# Patient Record
Sex: MALE | Race: WHITE | NOT HISPANIC OR LATINO | Employment: OTHER | URBAN - METROPOLITAN AREA
[De-identification: names, ages, dates, MRNs, and addresses within clinical notes are randomized per-mention and may not be internally consistent; named-entity substitution may affect disease eponyms.]

---

## 2017-01-10 ENCOUNTER — HOSPITAL ENCOUNTER (OUTPATIENT)
Dept: CARDIOLOGY | Facility: HOSPITAL | Age: 67
Setting detail: RECURRING SERIES
Discharge: HOME | End: 2017-01-10

## 2017-01-10 PROCEDURE — 85610 PROTHROMBIN TIME: CPT | Performed by: INTERNAL MEDICINE

## 2017-01-10 PROCEDURE — 36416 COLLJ CAPILLARY BLOOD SPEC: CPT | Performed by: INTERNAL MEDICINE

## 2017-01-18 ENCOUNTER — CLINICAL SUPPORT NO REQUIREMENTS (OUTPATIENT)
Dept: CARDIOLOGY | Facility: CLINIC | Age: 67
End: 2017-01-18

## 2017-01-18 DIAGNOSIS — I50.9 CONGESTIVE HEART FAILURE, UNSPECIFIED CONGESTIVE HEART FAILURE CHRONICITY, UNSPECIFIED CONGESTIVE HEART FAILURE TYPE: Primary | ICD-10-CM

## 2017-01-18 PROCEDURE — 93290 INTERROG DEV EVAL ICPMS IP: CPT | Performed by: INTERNAL MEDICINE

## 2017-01-18 PROCEDURE — 93281 PM DEVICE PROGR EVAL MULTI: CPT | Performed by: INTERNAL MEDICINE

## 2017-01-27 DIAGNOSIS — F32.A DEPRESSION: ICD-10-CM

## 2017-01-27 RX ORDER — CITALOPRAM 20 MG/1
TABLET ORAL
Qty: 30 TABLET | Refills: 4 | Status: SHIPPED | OUTPATIENT
Start: 2017-01-27 | End: 2019-05-03

## 2017-02-06 ENCOUNTER — HOSPITAL ENCOUNTER (OUTPATIENT)
Dept: CARDIOLOGY | Facility: HOSPITAL | Age: 67
Setting detail: RECURRING SERIES
Discharge: HOME | End: 2017-02-06

## 2017-02-06 PROCEDURE — 36416 COLLJ CAPILLARY BLOOD SPEC: CPT

## 2017-02-06 PROCEDURE — 85610 PROTHROMBIN TIME: CPT

## 2017-02-09 ENCOUNTER — OFFICE VISIT (OUTPATIENT)
Dept: INTERNAL MEDICINE | Facility: CLINIC | Age: 67
End: 2017-02-09

## 2017-02-09 VITALS
DIASTOLIC BLOOD PRESSURE: 66 MMHG | RESPIRATION RATE: 16 BRPM | TEMPERATURE: 96.9 F | SYSTOLIC BLOOD PRESSURE: 116 MMHG | HEART RATE: 72 BPM | BODY MASS INDEX: 29.96 KG/M2 | WEIGHT: 224 LBS

## 2017-02-09 DIAGNOSIS — I10 ESSENTIAL HYPERTENSION: ICD-10-CM

## 2017-02-09 DIAGNOSIS — Z95.820 S/P ANGIOPLASTY WITH STENT: ICD-10-CM

## 2017-02-09 DIAGNOSIS — R19.4 CHANGE IN BOWEL HABITS: ICD-10-CM

## 2017-02-09 DIAGNOSIS — I25.10 CORONARY ARTERY DISEASE INVOLVING NATIVE CORONARY ARTERY OF NATIVE HEART WITHOUT ANGINA PECTORIS: ICD-10-CM

## 2017-02-09 DIAGNOSIS — E78.2 MIXED HYPERLIPIDEMIA: Primary | ICD-10-CM

## 2017-02-09 DIAGNOSIS — E11.8 TYPE 2 DIABETES MELLITUS WITH COMPLICATION, WITH LONG-TERM CURRENT USE OF INSULIN (HCC): ICD-10-CM

## 2017-02-09 DIAGNOSIS — Z79.4 TYPE 2 DIABETES MELLITUS WITH COMPLICATION, WITH LONG-TERM CURRENT USE OF INSULIN (HCC): ICD-10-CM

## 2017-02-09 PROCEDURE — 99214 OFFICE O/P EST MOD 30 MIN: CPT | Performed by: FAMILY MEDICINE

## 2017-02-09 RX ORDER — CARVEDILOL 12.5 MG/1
6.25 TABLET ORAL 2 TIMES DAILY
Refills: 0 | COMMUNITY
Start: 2016-12-31 | End: 2017-10-18 | Stop reason: ALTCHOICE

## 2017-02-09 NOTE — PROGRESS NOTES
Subjective   Andreas Rubin is a 66 y.o. male.     Chief Complaint   Patient presents with   • GI Problem   • Hypertension   • Coronary Artery Disease   • Congestive Heart Failure         History of Present Illness   Patient is here continue by his brother.  He states he's feeling fine with no problems.  Actually he has a lot of flatus when he walks around passing gas.  He has a severe cardiomyopathy as noted by Dr. Jaffe and has a pacemaker which is checked frequently have reviewed the pacemaker recent documentation.  He has a cardiomyopathy with about 20% ejection fraction most recent echocardiogram.  He does sleep a lot in the daytime.  He enjoys watching fairly old television comedies.    He also has an enhanced gastrocolic reflex with tendency to want to have a bowel movement during the largest meal.  He has not had a colonoscopy in some time now.  Next option  Would be to get a colonoscopy.      The following portions of the patient's history were reviewed and updated as appropriate: allergies, current medications, past social history and problem list.    Review of Systems   Constitutional: Positive for fever.   HENT: Negative.    Eyes: Negative.    Respiratory: Negative.    Cardiovascular: Negative.    Gastrointestinal: Negative.    Endocrine: Negative.    Genitourinary: Negative.    Musculoskeletal: Negative.    Skin: Negative.    Allergic/Immunologic: Negative.    Neurological: Negative.    Hematological: Negative.    Psychiatric/Behavioral: Positive for sleep disturbance (Hypersomnulence).       Objective   Vitals:    02/09/17 1031   BP: 116/66   Pulse: 72   Resp: 16   Temp: 96.9 °F (36.1 °C)     Physical Exam   Constitutional: He is oriented to person, place, and time. He appears well-developed and well-nourished.   HENT:   Head: Normocephalic and atraumatic.   Right Ear: Tympanic membrane and external ear normal.   Left Ear: Tympanic membrane and external ear normal.   Nose: Nose normal.    Mouth/Throat: Oropharynx is clear and moist.   Eyes: Conjunctivae and EOM are normal. Pupils are equal, round, and reactive to light.   Neck: Normal range of motion. Neck supple. No JVD present. No thyromegaly present.   Cardiovascular: Normal rate, regular rhythm and intact distal pulses.  Exam reveals gallop and S3.    Pulmonary/Chest: Effort normal and breath sounds normal.   Abdominal: Soft. Bowel sounds are normal.   Musculoskeletal: Normal range of motion.   Lymphadenopathy:     He has no cervical adenopathy.   Neurological: He is alert and oriented to person, place, and time. No cranial nerve deficit. Coordination normal.   Skin: Skin is warm and dry. No rash noted.   Psychiatric: He has a normal mood and affect. His behavior is normal. Judgment and thought content normal.   Vitals reviewed.      Assessment/Plan   Problem List Items Addressed This Visit        Cardiovascular and Mediastinum    Hyperlipidemia - Primary    Essential hypertension    Relevant Medications    carvedilol (COREG) 12.5 MG tablet    Coronary artery disease involving native coronary artery of native heart without angina pectoris    Relevant Medications    carvedilol (COREG) 12.5 MG tablet       Endocrine    Type 2 diabetes mellitus with complication, with long-term current use of insulin       Other    S/P angioplasty with stent      Other Visit Diagnoses     Change in bowel habits        Relevant Orders    Ambulatory Referral For Screening Colonoscopy       plan: Referral to Dr. Valentin for colonoscopy.  Continue current medications.  Negative changes medications based on borderline low blood pressure.  Follow-up with Dr. Jaffe.  Recheck in 4 months.  Labs through Dr. JIM [facility]urston office.

## 2017-02-15 ENCOUNTER — TELEPHONE (OUTPATIENT)
Dept: CARDIOLOGY | Facility: CLINIC | Age: 67
End: 2017-02-15

## 2017-02-15 NOTE — TELEPHONE ENCOUNTER
Pt is scheduled to have a colonoscopy and needs the ok to stop AC 5 days prior. No history of stroke or valve replacement...Clau

## 2017-03-03 ENCOUNTER — ANESTHESIA (OUTPATIENT)
Dept: GASTROENTEROLOGY | Facility: HOSPITAL | Age: 67
End: 2017-03-03

## 2017-03-03 ENCOUNTER — ANESTHESIA EVENT (OUTPATIENT)
Dept: GASTROENTEROLOGY | Facility: HOSPITAL | Age: 67
End: 2017-03-03

## 2017-03-03 ENCOUNTER — HOSPITAL ENCOUNTER (OUTPATIENT)
Facility: HOSPITAL | Age: 67
Setting detail: HOSPITAL OUTPATIENT SURGERY
Discharge: HOME | End: 2017-03-03
Attending: SURGERY | Admitting: SURGERY

## 2017-03-03 VITALS
SYSTOLIC BLOOD PRESSURE: 121 MMHG | WEIGHT: 215.31 LBS | DIASTOLIC BLOOD PRESSURE: 83 MMHG | OXYGEN SATURATION: 95 % | BODY MASS INDEX: 29.16 KG/M2 | HEART RATE: 72 BPM | TEMPERATURE: 98.4 F | HEIGHT: 72 IN | RESPIRATION RATE: 18 BRPM

## 2017-03-03 LAB — GLUCOSE BLDC GLUCOMTR-MCNC: 148 MG/DL (ref 70–130)

## 2017-03-03 PROCEDURE — 82962 GLUCOSE BLOOD TEST: CPT

## 2017-03-03 PROCEDURE — 25010000002 PROPOFOL 10 MG/ML EMULSION: Performed by: ANESTHESIOLOGY

## 2017-03-03 RX ORDER — PROPOFOL 10 MG/ML
VIAL (ML) INTRAVENOUS AS NEEDED
Status: DISCONTINUED | OUTPATIENT
Start: 2017-03-03 | End: 2017-03-03 | Stop reason: SURG

## 2017-03-03 RX ORDER — PROPOFOL 10 MG/ML
VIAL (ML) INTRAVENOUS CONTINUOUS PRN
Status: DISCONTINUED | OUTPATIENT
Start: 2017-03-03 | End: 2017-03-03 | Stop reason: SURG

## 2017-03-03 RX ORDER — SODIUM CHLORIDE, SODIUM LACTATE, POTASSIUM CHLORIDE, CALCIUM CHLORIDE 600; 310; 30; 20 MG/100ML; MG/100ML; MG/100ML; MG/100ML
30 INJECTION, SOLUTION INTRAVENOUS CONTINUOUS PRN
Status: DISCONTINUED | OUTPATIENT
Start: 2017-03-03 | End: 2017-03-03 | Stop reason: HOSPADM

## 2017-03-03 RX ORDER — LIDOCAINE HYDROCHLORIDE 20 MG/ML
INJECTION, SOLUTION INFILTRATION; PERINEURAL AS NEEDED
Status: DISCONTINUED | OUTPATIENT
Start: 2017-03-03 | End: 2017-03-03 | Stop reason: SURG

## 2017-03-03 RX ADMIN — LIDOCAINE HYDROCHLORIDE 60 MG: 20 INJECTION, SOLUTION INFILTRATION; PERINEURAL at 08:51

## 2017-03-03 RX ADMIN — PROPOFOL 100 MCG/KG/MIN: 10 INJECTION, EMULSION INTRAVENOUS at 08:51

## 2017-03-03 RX ADMIN — SODIUM CHLORIDE, POTASSIUM CHLORIDE, SODIUM LACTATE AND CALCIUM CHLORIDE: 600; 310; 30; 20 INJECTION, SOLUTION INTRAVENOUS at 08:45

## 2017-03-03 RX ADMIN — PROPOFOL 100 MG: 10 INJECTION, EMULSION INTRAVENOUS at 08:50

## 2017-03-03 NOTE — ANESTHESIA PREPROCEDURE EVALUATION
Anesthesia Evaluation     Patient summary reviewed and Nursing notes reviewed      Airway   Mallampati: I  TM distance: <3 FB  Neck ROM: full  no difficulty expected  Dental - normal exam     Pulmonary - normal exam   (+) sleep apnea,   Cardiovascular - normal exam    (+) pacemaker pacemaker,hypertension, CAD, dysrhythmias,       Neuro/Psych- negative ROS  GI/Hepatic/Renal/Endo    (+)  diabetes mellitus,     Musculoskeletal (-) negative ROS    Abdominal  - normal exam    Bowel sounds: normal.   Substance History - negative use     OB/GYN negative ob/gyn ROS         Other                                    Anesthesia Plan    ASA 3     MAC     Anesthetic plan and risks discussed with patient.

## 2017-03-03 NOTE — H&P
Andreas Rubin is a 66 y.o. male who presents today for a Colonoscopy.  He has had a change in bowel habits with increased urgency of late.  He is about due for his screening, too.    Past Medical History   Diagnosis Date   • Atrial fibrillation    • DM type 2 (diabetes mellitus, type 2)    • Hyperlipidemia    • Hypertension          Objective     Pt is in no distress.  Heart regular.  Chest clear.  Abdomen soft.  Rectal deferred to endoscopy.      Assessment/Plan      Plan a Colonoscopy today.  Risks and benefits were discussed.  Patient is agreeable.  Final recommendations will follow depending on the results.

## 2017-03-03 NOTE — ANESTHESIA POSTPROCEDURE EVALUATION
Patient: Andreas Rubin    Procedure Summary     Date Anesthesia Start Anesthesia Stop Room / Location    03/03/17 0847 0912  [facility]       Procedure Diagnosis Surgeon Provider    COLONOSCOPY TO CECUM (N/A ) No diagnosis on file. MD Basilio Oneill MD          Anesthesia Type: MAC  Last vitals  /71 (03/03/17 0911)    Temp      Pulse 70 (03/03/17 0911)   Resp 16 (03/03/17 0911)    SpO2 95 % (03/03/17 0911)      Post Anesthesia Care and Evaluation    Patient location during evaluation: PACU  Patient participation: complete - patient participated  Level of consciousness: awake and alert  Pain score: 0  Pain management: adequate  Airway patency: patent  Anesthetic complications: No anesthetic complications  PONV Status: none  Cardiovascular status: acceptable  Respiratory status: acceptable  Hydration status: acceptable

## 2017-03-07 ENCOUNTER — HOSPITAL ENCOUNTER (OUTPATIENT)
Dept: CARDIOLOGY | Facility: HOSPITAL | Age: 67
Setting detail: RECURRING SERIES
Discharge: HOME | End: 2017-03-07

## 2017-03-07 PROCEDURE — 36416 COLLJ CAPILLARY BLOOD SPEC: CPT

## 2017-03-07 PROCEDURE — 85610 PROTHROMBIN TIME: CPT

## 2017-03-10 ENCOUNTER — HOSPITAL ENCOUNTER (OUTPATIENT)
Dept: CARDIOLOGY | Facility: HOSPITAL | Age: 67
Setting detail: RECURRING SERIES
Discharge: HOME | End: 2017-03-10

## 2017-03-10 PROCEDURE — 36416 COLLJ CAPILLARY BLOOD SPEC: CPT

## 2017-03-10 PROCEDURE — 85610 PROTHROMBIN TIME: CPT

## 2017-03-13 DIAGNOSIS — F32.A DEPRESSION: ICD-10-CM

## 2017-03-13 RX ORDER — CITALOPRAM 20 MG/1
TABLET ORAL
Qty: 30 TABLET | Refills: 4 | Status: SHIPPED | OUTPATIENT
Start: 2017-03-13 | End: 2017-07-03 | Stop reason: SDUPTHER

## 2017-03-13 RX ORDER — WARFARIN SODIUM 5 MG/1
TABLET ORAL
Qty: 90 TABLET | Refills: 0 | Status: SHIPPED | OUTPATIENT
Start: 2017-03-13 | End: 2017-06-26 | Stop reason: SDUPTHER

## 2017-03-15 RX ORDER — PRAVASTATIN SODIUM 40 MG
40 TABLET ORAL NIGHTLY
Qty: 90 TABLET | Refills: 1 | Status: SHIPPED | OUTPATIENT
Start: 2017-03-15 | End: 2017-12-18 | Stop reason: SDUPTHER

## 2017-03-17 ENCOUNTER — HOSPITAL ENCOUNTER (OUTPATIENT)
Dept: CARDIOLOGY | Facility: HOSPITAL | Age: 67
Setting detail: RECURRING SERIES
Discharge: HOME | End: 2017-03-17

## 2017-03-17 PROCEDURE — 36416 COLLJ CAPILLARY BLOOD SPEC: CPT

## 2017-03-17 PROCEDURE — 85610 PROTHROMBIN TIME: CPT

## 2017-03-21 DIAGNOSIS — I10 ESSENTIAL HYPERTENSION: ICD-10-CM

## 2017-03-22 RX ORDER — FUROSEMIDE 40 MG/1
TABLET ORAL
Qty: 30 TABLET | Refills: 5 | Status: SHIPPED | OUTPATIENT
Start: 2017-03-22 | End: 2017-10-06 | Stop reason: SDUPTHER

## 2017-03-30 RX ORDER — CARVEDILOL 12.5 MG/1
TABLET ORAL
Qty: 180 TABLET | Refills: 0 | Status: SHIPPED | OUTPATIENT
Start: 2017-03-30 | End: 2017-07-03

## 2017-03-31 ENCOUNTER — HOSPITAL ENCOUNTER (OUTPATIENT)
Dept: CARDIOLOGY | Facility: HOSPITAL | Age: 67
Setting detail: RECURRING SERIES
Discharge: HOME | End: 2017-03-31

## 2017-03-31 PROCEDURE — 36416 COLLJ CAPILLARY BLOOD SPEC: CPT

## 2017-03-31 PROCEDURE — 85610 PROTHROMBIN TIME: CPT

## 2017-04-19 ENCOUNTER — CLINICAL SUPPORT NO REQUIREMENTS (OUTPATIENT)
Dept: CARDIOLOGY | Facility: CLINIC | Age: 67
End: 2017-04-19

## 2017-04-19 DIAGNOSIS — I50.9 CHRONIC CONGESTIVE HEART FAILURE, UNSPECIFIED CONGESTIVE HEART FAILURE TYPE: Primary | ICD-10-CM

## 2017-04-19 PROCEDURE — 93297 REM INTERROG DEV EVAL ICPMS: CPT | Performed by: INTERNAL MEDICINE

## 2017-04-19 PROCEDURE — 93296 REM INTERROG EVL PM/IDS: CPT | Performed by: INTERNAL MEDICINE

## 2017-04-19 PROCEDURE — 93295 DEV INTERROG REMOTE 1/2/MLT: CPT | Performed by: INTERNAL MEDICINE

## 2017-04-25 RX ORDER — INSULIN DETEMIR 100 [IU]/ML
INJECTION, SOLUTION SUBCUTANEOUS
Qty: 58 ML | Refills: 1 | Status: SHIPPED | OUTPATIENT
Start: 2017-04-25 | End: 2018-09-24 | Stop reason: SDUPTHER

## 2017-05-04 ENCOUNTER — HOSPITAL ENCOUNTER (OUTPATIENT)
Dept: CARDIOLOGY | Facility: HOSPITAL | Age: 67
Setting detail: RECURRING SERIES
Discharge: HOME | End: 2017-05-04

## 2017-05-04 PROCEDURE — 85610 PROTHROMBIN TIME: CPT

## 2017-05-04 PROCEDURE — 36416 COLLJ CAPILLARY BLOOD SPEC: CPT

## 2017-05-17 PROCEDURE — 85610 PROTHROMBIN TIME: CPT

## 2017-05-17 PROCEDURE — 36416 COLLJ CAPILLARY BLOOD SPEC: CPT

## 2017-05-18 ENCOUNTER — HOSPITAL ENCOUNTER (OUTPATIENT)
Dept: CARDIOLOGY | Facility: HOSPITAL | Age: 67
Setting detail: RECURRING SERIES
Discharge: HOME | End: 2017-05-18

## 2017-05-18 PROCEDURE — 36416 COLLJ CAPILLARY BLOOD SPEC: CPT

## 2017-05-18 PROCEDURE — 85610 PROTHROMBIN TIME: CPT

## 2017-06-15 ENCOUNTER — HOSPITAL ENCOUNTER (OUTPATIENT)
Dept: CARDIOLOGY | Facility: HOSPITAL | Age: 67
Setting detail: RECURRING SERIES
Discharge: HOME | End: 2017-06-15

## 2017-06-15 PROCEDURE — 36416 COLLJ CAPILLARY BLOOD SPEC: CPT

## 2017-06-15 PROCEDURE — 85610 PROTHROMBIN TIME: CPT

## 2017-06-19 ENCOUNTER — OFFICE VISIT (OUTPATIENT)
Dept: INTERNAL MEDICINE | Facility: CLINIC | Age: 67
End: 2017-06-19

## 2017-06-19 VITALS
WEIGHT: 217 LBS | SYSTOLIC BLOOD PRESSURE: 86 MMHG | BODY MASS INDEX: 29.39 KG/M2 | RESPIRATION RATE: 16 BRPM | HEIGHT: 72 IN | HEART RATE: 71 BPM | OXYGEN SATURATION: 95 % | DIASTOLIC BLOOD PRESSURE: 59 MMHG | TEMPERATURE: 98.3 F

## 2017-06-19 DIAGNOSIS — R27.0 ATAXIA: ICD-10-CM

## 2017-06-19 DIAGNOSIS — I10 ESSENTIAL HYPERTENSION: ICD-10-CM

## 2017-06-19 DIAGNOSIS — I95.1 ORTHOSTATIC HYPOTENSION: Primary | ICD-10-CM

## 2017-06-19 DIAGNOSIS — E11.8 TYPE 2 DIABETES MELLITUS WITH COMPLICATION, WITH LONG-TERM CURRENT USE OF INSULIN (HCC): ICD-10-CM

## 2017-06-19 DIAGNOSIS — I25.10 CORONARY ARTERY DISEASE INVOLVING NATIVE CORONARY ARTERY OF NATIVE HEART WITHOUT ANGINA PECTORIS: ICD-10-CM

## 2017-06-19 DIAGNOSIS — Z79.4 TYPE 2 DIABETES MELLITUS WITH COMPLICATION, WITH LONG-TERM CURRENT USE OF INSULIN (HCC): ICD-10-CM

## 2017-06-19 PROCEDURE — 99214 OFFICE O/P EST MOD 30 MIN: CPT | Performed by: FAMILY MEDICINE

## 2017-06-19 NOTE — PROGRESS NOTES
Subjective   Andreas Rubin is a 66 y.o. male.     Chief Complaint   Patient presents with   • Gait Problem   • Cardiomyopathy   • Hypotension         History of Present Illness   Patient is here currently by his brother.  He is somewhat mentally challenged has a history of pacer placement and cardiomyopathy along with diabetes as well as some reported hypertension.  Today's blood pressure is low in his had some ataxia home.  No syncope.  He feels like he is having no symptoms whatsoever and does not complain of anything.  He is very cheerful.      The following portions of the patient's history were reviewed and updated as appropriate: allergies, current medications, past social history and problem list.    Review of Systems   HENT: Negative.    Eyes: Negative.    Respiratory: Negative.    Cardiovascular: Negative.    Gastrointestinal: Negative.    Endocrine: Negative.    Genitourinary: Negative.    Musculoskeletal: Positive for gait problem.   Skin: Negative.    Allergic/Immunologic: Negative.    Neurological: Positive for weakness.   Hematological: Negative.    Psychiatric/Behavioral: Negative.        Objective   Vitals:    06/19/17 1046   BP: (!) 86/59   Pulse: 71   Resp: 16   Temp: 98.3 °F (36.8 °C)   SpO2: 95%     Physical Exam   Constitutional: He is oriented to person, place, and time. He appears well-developed and well-nourished.   HENT:   Head: Normocephalic and atraumatic.   Right Ear: Tympanic membrane and external ear normal.   Left Ear: Tympanic membrane and external ear normal.   Nose: Nose normal.   Mouth/Throat: Oropharynx is clear and moist.   Eyes: Conjunctivae and EOM are normal. Pupils are equal, round, and reactive to light.   Neck: Normal range of motion. Neck supple. No JVD present. No thyromegaly present.   Cardiovascular: Normal rate, regular rhythm, normal heart sounds and intact distal pulses.   Occasional extrasystoles are present.   Pulmonary/Chest: Effort normal and breath sounds  normal.   Abdominal: Soft. Bowel sounds are normal.   Musculoskeletal: Normal range of motion.   Lymphadenopathy:     He has no cervical adenopathy.   Neurological: He is alert and oriented to person, place, and time. No cranial nerve deficit. Coordination normal.   Skin: Skin is warm and dry. No rash noted.   Psychiatric: He has a normal mood and affect. His behavior is normal. Judgment and thought content normal.   Vitals reviewed.      Assessment/Plan   Problem List Items Addressed This Visit        Cardiovascular and Mediastinum    Essential hypertension    Coronary artery disease involving native coronary artery of native heart without angina pectoris       Endocrine    Type 2 diabetes mellitus with complication, with long-term current use of insulin      Other Visit Diagnoses     Orthostatic hypotension    -  Primary    Relevant Orders    Ambulatory Referral to Cardiology    Basic Metabolic Panel    CBC & Differential    Ataxia        Relevant Orders    Ambulatory Referral to Cardiology    Basic Metabolic Panel    CBC & Differential      Plan: We'll check BMP and CBC today.  Referral back to Dr. Jaffe.  Otherwise see him back in 5 months.  Continue current meds as far as hypertension for now pending cardiology follow-up.

## 2017-06-20 ENCOUNTER — TELEPHONE (OUTPATIENT)
Dept: CARDIOLOGY | Facility: CLINIC | Age: 67
End: 2017-06-20

## 2017-06-20 LAB
BASOPHILS # BLD AUTO: 0.06 10*3/MM3 (ref 0–0.2)
BASOPHILS NFR BLD AUTO: 0.9 % (ref 0–1.5)
BUN SERPL-MCNC: 16 MG/DL (ref 8–23)
BUN/CREAT SERPL: 18.6 (ref 7–25)
CALCIUM SERPL-MCNC: 9.6 MG/DL (ref 8.6–10.5)
CHLORIDE SERPL-SCNC: 99 MMOL/L (ref 98–107)
CO2 SERPL-SCNC: 28.5 MMOL/L (ref 22–29)
CREAT SERPL-MCNC: 0.86 MG/DL (ref 0.76–1.27)
EOSINOPHIL # BLD AUTO: 0.26 10*3/MM3 (ref 0–0.7)
EOSINOPHIL NFR BLD AUTO: 4.1 % (ref 0.3–6.2)
ERYTHROCYTE [DISTWIDTH] IN BLOOD BY AUTOMATED COUNT: 15 % (ref 11.5–14.5)
GLUCOSE SERPL-MCNC: 149 MG/DL (ref 65–99)
HCT VFR BLD AUTO: 46.8 % (ref 40.4–52.2)
HGB BLD-MCNC: 15.8 G/DL (ref 13.7–17.6)
IMM GRANULOCYTES # BLD: 0.02 10*3/MM3 (ref 0–0.03)
IMM GRANULOCYTES NFR BLD: 0.3 % (ref 0–0.5)
LYMPHOCYTES # BLD AUTO: 1.25 10*3/MM3 (ref 0.9–4.8)
LYMPHOCYTES NFR BLD AUTO: 19.6 % (ref 19.6–45.3)
MCH RBC QN AUTO: 32.5 PG (ref 27–32.7)
MCHC RBC AUTO-ENTMCNC: 33.8 G/DL (ref 32.6–36.4)
MCV RBC AUTO: 96.3 FL (ref 79.8–96.2)
MONOCYTES # BLD AUTO: 0.79 10*3/MM3 (ref 0.2–1.2)
MONOCYTES NFR BLD AUTO: 12.4 % (ref 5–12)
NEUTROPHILS # BLD AUTO: 4 10*3/MM3 (ref 1.9–8.1)
NEUTROPHILS NFR BLD AUTO: 62.7 % (ref 42.7–76)
PLATELET # BLD AUTO: 174 10*3/MM3 (ref 140–500)
POTASSIUM SERPL-SCNC: 4.8 MMOL/L (ref 3.5–5.2)
RBC # BLD AUTO: 4.86 10*6/MM3 (ref 4.6–6)
SODIUM SERPL-SCNC: 142 MMOL/L (ref 136–145)
WBC # BLD AUTO: 6.38 10*3/MM3 (ref 4.5–10.7)

## 2017-06-20 NOTE — TELEPHONE ENCOUNTER
Put him on my schedule 7/6/17 am-if patient and family are okay with that-he will not be in the office but he will be around if I need him.

## 2017-06-20 NOTE — TELEPHONE ENCOUNTER
Dr Dsouza put in a referral for Mr Caryn to see  for the first available follow up for low BP.  This is not until September.  Can you please take a look at his chart and let me know what we should do?    Thank you  Kenyatta CHEW

## 2017-06-22 ENCOUNTER — TELEPHONE (OUTPATIENT)
Dept: INTERNAL MEDICINE | Facility: CLINIC | Age: 67
End: 2017-06-22

## 2017-06-26 RX ORDER — WARFARIN SODIUM 5 MG/1
TABLET ORAL
Qty: 90 TABLET | Refills: 0 | Status: SHIPPED | OUTPATIENT
Start: 2017-06-26 | End: 2017-10-18 | Stop reason: SINTOL

## 2017-07-03 ENCOUNTER — OFFICE VISIT (OUTPATIENT)
Dept: ENDOCRINOLOGY | Age: 67
End: 2017-07-03

## 2017-07-03 VITALS
HEART RATE: 76 BPM | WEIGHT: 217.2 LBS | OXYGEN SATURATION: 97 % | BODY MASS INDEX: 29.42 KG/M2 | SYSTOLIC BLOOD PRESSURE: 118 MMHG | HEIGHT: 72 IN | DIASTOLIC BLOOD PRESSURE: 80 MMHG

## 2017-07-03 DIAGNOSIS — Z79.4 TYPE 2 DIABETES MELLITUS WITH COMPLICATION, WITH LONG-TERM CURRENT USE OF INSULIN (HCC): Primary | ICD-10-CM

## 2017-07-03 DIAGNOSIS — Z95.820 S/P ANGIOPLASTY WITH STENT: ICD-10-CM

## 2017-07-03 DIAGNOSIS — E11.3293 TYPE 2 DIABETES MELLITUS WITH MILD NONPROLIFERATIVE RETINOPATHY OF BOTH EYES, WITH LONG-TERM CURRENT USE OF INSULIN, MACULAR EDEMA PRESENCE UNSPECIFIED (HCC): ICD-10-CM

## 2017-07-03 DIAGNOSIS — I25.10 CORONARY ARTERY DISEASE INVOLVING NATIVE CORONARY ARTERY OF NATIVE HEART WITHOUT ANGINA PECTORIS: ICD-10-CM

## 2017-07-03 DIAGNOSIS — E78.5 HYPERLIPIDEMIA, UNSPECIFIED HYPERLIPIDEMIA TYPE: ICD-10-CM

## 2017-07-03 DIAGNOSIS — Z79.4 TYPE 2 DIABETES MELLITUS WITH MILD NONPROLIFERATIVE RETINOPATHY OF BOTH EYES, WITH LONG-TERM CURRENT USE OF INSULIN, MACULAR EDEMA PRESENCE UNSPECIFIED (HCC): ICD-10-CM

## 2017-07-03 DIAGNOSIS — G47.33 OBSTRUCTIVE SLEEP APNEA SYNDROME: ICD-10-CM

## 2017-07-03 DIAGNOSIS — E11.8 TYPE 2 DIABETES MELLITUS WITH COMPLICATION, WITH LONG-TERM CURRENT USE OF INSULIN (HCC): Primary | ICD-10-CM

## 2017-07-03 DIAGNOSIS — I10 ESSENTIAL HYPERTENSION: ICD-10-CM

## 2017-07-03 PROBLEM — I77.9 RIGHT-SIDED CAROTID ARTERY DISEASE (HCC): Status: ACTIVE | Noted: 2017-07-03

## 2017-07-03 LAB
ALBUMIN SERPL-MCNC: 4.3 G/DL (ref 3.5–5.2)
ALBUMIN/GLOB SERPL: 1.5 G/DL
ALP SERPL-CCNC: 99 U/L (ref 39–117)
ALT SERPL-CCNC: 17 U/L (ref 1–41)
AST SERPL-CCNC: 21 U/L (ref 1–40)
BILIRUB SERPL-MCNC: 0.9 MG/DL (ref 0.1–1.2)
BUN SERPL-MCNC: 13 MG/DL (ref 8–23)
BUN/CREAT SERPL: 13.8 (ref 7–25)
CALCIUM SERPL-MCNC: 9.6 MG/DL (ref 8.6–10.5)
CHLORIDE SERPL-SCNC: 98 MMOL/L (ref 98–107)
CHOLEST SERPL-MCNC: 201 MG/DL (ref 0–200)
CO2 SERPL-SCNC: 28.2 MMOL/L (ref 22–29)
CREAT SERPL-MCNC: 0.94 MG/DL (ref 0.76–1.27)
GLOBULIN SER CALC-MCNC: 2.9 GM/DL
GLUCOSE SERPL-MCNC: 127 MG/DL (ref 65–99)
HBA1C MFR BLD: 6.9 % (ref 4.8–5.6)
HDLC SERPL-MCNC: 33 MG/DL (ref 40–60)
LDLC SERPL CALC-MCNC: 104 MG/DL (ref 0–100)
POTASSIUM SERPL-SCNC: 4.5 MMOL/L (ref 3.5–5.2)
PROT SERPL-MCNC: 7.2 G/DL (ref 6–8.5)
SODIUM SERPL-SCNC: 141 MMOL/L (ref 136–145)
T4 FREE SERPL-MCNC: 1.02 NG/DL (ref 0.93–1.7)
TRIGL SERPL-MCNC: 319 MG/DL (ref 0–150)
TSH SERPL DL<=0.005 MIU/L-ACNC: 2.31 MIU/ML (ref 0.27–4.2)
UNABLE TO VOID: NORMAL
VLDLC SERPL CALC-MCNC: 63.8 MG/DL (ref 5–40)

## 2017-07-03 PROCEDURE — 99214 OFFICE O/P EST MOD 30 MIN: CPT | Performed by: INTERNAL MEDICINE

## 2017-07-03 NOTE — PROGRESS NOTES
Subjective   Andreas Rubin is a 66 y.o. male.     HPI Comments: F/u for dm2,hyperlipidemia, hypertension, cad,sleep apnea / testing bs  2 x day / last dm eye exam 5/31/17 with dr Wade / last dm foot exam today with dr Carpenter    Diabetes   Hypoglycemia symptoms include confusion and nervousness/anxiousness. Associated symptoms include fatigue.   Hypertension   Identifiable causes of hypertension include sleep apnea.   Hyperlipidemia     Coronary Artery Disease   Risk factors include hyperlipidemia.   Sleep Apnea   Associated symptoms include fatigue and joint swelling.      Patient is a 66-year-old male who came in for followup. He has type 2 diabetes mellitus since 2001. He started on insulin in 2008. C-peptide levels were detectable and KRISTAL antibody was negative. He has been on Levemir 30 units twice a day and Humalog 15 units twice a day and metformin 500 mg twice a day. Fasting blood sugar runs between . He has denies hypoglycemic episodes.  He has lost 8 pounds since August 2016.  His last meal was 6 AM.      His last eye examination was in 5/17 and there was mild nonproliferative diabetic retinopathy. He has microalbuminuria on urine sample taken last 5/15. He has intermittent numbness on both feet but no pain.      He has hyperlipidemia and was taken off Lipitor because he was having leg cramps. He has been on pravastatin 40 mg every evening. He has nocturnal leg cramps which is improved with Requip.        He has hypertension and has been on Coreg, Lasix, and spironolactone.       He has known coronary artery disease and had previous angioplasty with stent. He was admitted last June 2016 for congestive heart failure. He denies any chest pain. He has SOB with exertion. He has a history of atrial fibrillation and is on chronic Coumadin therapy. He denies melena or hematochezia.  He denies any palpitations. He denies any bleeding. ProTime is being monitored by Dr. James.        He has sleep apnea  "and is unable to tolerate CPAP. He has been off nocturnal oxygen because he has not followed up with Dr. Barnes.     The following portions of the patient's history were reviewed and updated as appropriate: allergies, current medications, past family history, past medical history, past social history, past surgical history and problem list.    Review of Systems   Constitutional: Positive for fatigue.   HENT: Negative.    Eyes: Negative.    Respiratory: Negative.    Cardiovascular: Negative.    Gastrointestinal: Positive for diarrhea.   Endocrine: Negative.    Genitourinary: Positive for frequency.   Musculoskeletal: Positive for back pain and joint swelling.   Skin: Negative.    Allergic/Immunologic: Negative.    Hematological: Bruises/bleeds easily (on coumadin ).   Psychiatric/Behavioral: Positive for agitation, confusion and sleep disturbance (sleep apnea unable to tolerate ). The patient is nervous/anxious.        Objective      Vitals:    07/03/17 0945   BP: 118/80   BP Location: Right arm   Patient Position: Sitting   Cuff Size: Large Adult   Pulse: 76   SpO2: 97%   Weight: 217 lb 3.2 oz (98.5 kg)   Height: 72\" (182.9 cm)     Physical Exam   Constitutional: He is oriented to person, place, and time. He appears well-developed and well-nourished. No distress.   HENT:   Head: Normocephalic.   Nose: Nose normal.   Mouth/Throat: No oropharyngeal exudate.   Eyes: Conjunctivae and EOM are normal. Right eye exhibits no discharge. Left eye exhibits no discharge. No scleral icterus.   Neck: Neck supple. No JVD present. No tracheal deviation present. No thyromegaly present.   Cardiovascular: Normal rate, regular rhythm, normal heart sounds and intact distal pulses.  Exam reveals no friction rub.    No murmur heard.  Pulmonary/Chest: Effort normal and breath sounds normal. No respiratory distress. He has no wheezes. He has no rales. He exhibits no tenderness.   Abdominal: Soft. Bowel sounds are normal. He exhibits no " distension and no mass. There is no tenderness.   Musculoskeletal: Normal range of motion. He exhibits no edema, tenderness or deformity.   Lymphadenopathy:     He has no cervical adenopathy.   Neurological: He is alert and oriented to person, place, and time. He has normal reflexes. He displays normal reflexes.   Intact light touch in both upper and lower extremities   Skin: Skin is warm and dry. No rash noted. No erythema.   Acanthosis nigricans on elbows and knees   Psychiatric: He has a normal mood and affect. His behavior is normal.     Office Visit on 06/19/2017   Component Date Value Ref Range Status   • Glucose 06/19/2017 149* 65 - 99 mg/dL Final   • BUN 06/19/2017 16  8 - 23 mg/dL Final   • Creatinine 06/19/2017 0.86  0.76 - 1.27 mg/dL Final   • eGFR Non  Am 06/19/2017 89  >60 mL/min/1.73 Final   • eGFR African Am 06/19/2017 108  >60 mL/min/1.73 Final   • BUN/Creatinine Ratio 06/19/2017 18.6  7.0 - 25.0 Final   • Sodium 06/19/2017 142  136 - 145 mmol/L Final   • Potassium 06/19/2017 4.8  3.5 - 5.2 mmol/L Final   • Chloride 06/19/2017 99  98 - 107 mmol/L Final   • Total CO2 06/19/2017 28.5  22.0 - 29.0 mmol/L Final   • Calcium 06/19/2017 9.6  8.6 - 10.5 mg/dL Final   • WBC 06/19/2017 6.38  4.50 - 10.70 10*3/mm3 Final   • RBC 06/19/2017 4.86  4.60 - 6.00 10*6/mm3 Final   • Hemoglobin 06/19/2017 15.8  13.7 - 17.6 g/dL Final   • Hematocrit 06/19/2017 46.8  40.4 - 52.2 % Final   • MCV 06/19/2017 96.3* 79.8 - 96.2 fL Final   • MCH 06/19/2017 32.5  27.0 - 32.7 pg Final   • MCHC 06/19/2017 33.8  32.6 - 36.4 g/dL Final   • RDW 06/19/2017 15.0* 11.5 - 14.5 % Final   • Platelets 06/19/2017 174  140 - 500 10*3/mm3 Final   • Neutrophil Rel % 06/19/2017 62.7  42.7 - 76.0 % Final   • Lymphocyte Rel % 06/19/2017 19.6  19.6 - 45.3 % Final   • Monocyte Rel % 06/19/2017 12.4* 5.0 - 12.0 % Final   • Eosinophil Rel % 06/19/2017 4.1  0.3 - 6.2 % Final   • Basophil Rel % 06/19/2017 0.9  0.0 - 1.5 % Final   • Neutrophils  Absolute 06/19/2017 4.00  1.90 - 8.10 10*3/mm3 Final   • Lymphocytes Absolute 06/19/2017 1.25  0.90 - 4.80 10*3/mm3 Final   • Monocytes Absolute 06/19/2017 0.79  0.20 - 1.20 10*3/mm3 Final   • Eosinophils Absolute 06/19/2017 0.26  0.00 - 0.70 10*3/mm3 Final   • Basophils Absolute 06/19/2017 0.06  0.00 - 0.20 10*3/mm3 Final   • Immature Granulocyte Rel % 06/19/2017 0.3  0.0 - 0.5 % Final   • Immature Grans Absolute 06/19/2017 0.02  0.00 - 0.03 10*3/mm3 Final     Assessment/Plan   Andreas was seen today for diabetes, hypertension, hyperlipidemia, coronary artery disease and sleep apnea.    Diagnoses and all orders for this visit:    Type 2 diabetes mellitus with complication, with long-term current use of insulin  -     Comprehensive Metabolic Panel  -     Hemoglobin A1c  -     TSH  -     T4, Free  -     Microalbumin / Creatinine Urine Ratio    Type 2 diabetes mellitus with mild nonproliferative retinopathy of both eyes, with long-term current use of insulin, macular edema presence unspecified    Coronary artery disease involving native coronary artery of native heart without angina pectoris    Essential hypertension    S/P angioplasty with stent    Obstructive sleep apnea syndrome    Hyperlipidemia, unspecified hyperlipidemia type  -     Lipid Panel  -     TSH  -     T4, Free      Continue Levemir and Humalog and metformin.    Check urine microalbumin and hemoglobin A1c.  Continue pravastatin 40 mg once a day.  Check lipid profile.  Advised to follow-up with Dr. Barnes.  Continue Coreg, Lasix and spironolactone.    Send copy of my notes and labs to Dr. Dsouza, Dr. James, and Dr. Kam Barnes    RTC 4 mos

## 2017-07-06 ENCOUNTER — CLINICAL SUPPORT NO REQUIREMENTS (OUTPATIENT)
Dept: CARDIOLOGY | Facility: CLINIC | Age: 67
End: 2017-07-06

## 2017-07-06 ENCOUNTER — OFFICE VISIT (OUTPATIENT)
Dept: CARDIOLOGY | Facility: CLINIC | Age: 67
End: 2017-07-06

## 2017-07-06 VITALS
DIASTOLIC BLOOD PRESSURE: 86 MMHG | SYSTOLIC BLOOD PRESSURE: 100 MMHG | HEART RATE: 75 BPM | WEIGHT: 212.2 LBS | HEIGHT: 72 IN | BODY MASS INDEX: 28.74 KG/M2

## 2017-07-06 DIAGNOSIS — I48.0 PAROXYSMAL ATRIAL FIBRILLATION (HCC): ICD-10-CM

## 2017-07-06 DIAGNOSIS — Z95.820 S/P ANGIOPLASTY WITH STENT: ICD-10-CM

## 2017-07-06 DIAGNOSIS — I25.10 CORONARY ARTERY DISEASE INVOLVING NATIVE CORONARY ARTERY OF NATIVE HEART WITHOUT ANGINA PECTORIS: ICD-10-CM

## 2017-07-06 DIAGNOSIS — I50.9 CHRONIC CONGESTIVE HEART FAILURE, UNSPECIFIED CONGESTIVE HEART FAILURE TYPE: Primary | ICD-10-CM

## 2017-07-06 DIAGNOSIS — I95.1 HYPOTENSION, POSTURAL: Primary | ICD-10-CM

## 2017-07-06 DIAGNOSIS — Z95.0 S/P BIVENTRICULAR CARDIAC PACEMAKER PROCEDURE: ICD-10-CM

## 2017-07-06 DIAGNOSIS — I42.9 CARDIOMYOPATHY (HCC): ICD-10-CM

## 2017-07-06 PROCEDURE — 93281 PM DEVICE PROGR EVAL MULTI: CPT | Performed by: INTERNAL MEDICINE

## 2017-07-06 PROCEDURE — 93000 ELECTROCARDIOGRAM COMPLETE: CPT | Performed by: NURSE PRACTITIONER

## 2017-07-06 PROCEDURE — 99214 OFFICE O/P EST MOD 30 MIN: CPT | Performed by: NURSE PRACTITIONER

## 2017-07-12 ENCOUNTER — TELEPHONE (OUTPATIENT)
Dept: CARDIOLOGY | Facility: CLINIC | Age: 67
End: 2017-07-12

## 2017-07-12 NOTE — TELEPHONE ENCOUNTER
Did he say whether they had checked with Dr. Dsouza as usually the PCP orders these types of things?

## 2017-07-12 NOTE — TELEPHONE ENCOUNTER
Johann(pt's father) called to let you know the pt had another fall yesterday. He is wanting to know if you would help get the pt a walker.......Samia

## 2017-07-13 ENCOUNTER — HOSPITAL ENCOUNTER (OUTPATIENT)
Dept: CARDIOLOGY | Facility: HOSPITAL | Age: 67
Setting detail: RECURRING SERIES
Discharge: HOME | End: 2017-07-13

## 2017-07-13 PROCEDURE — 85610 PROTHROMBIN TIME: CPT

## 2017-07-13 PROCEDURE — 36416 COLLJ CAPILLARY BLOOD SPEC: CPT

## 2017-07-14 NOTE — TELEPHONE ENCOUNTER
No he has not discussed with PCP yet. He is coming to you with this since you all had already discussed this some. Also Dr. Dsouza is a new doctor to them and he is afraid it would be more of an issue getting him to do it.......Samia

## 2017-07-14 NOTE — TELEPHONE ENCOUNTER
I called, no answer, left a message that sometimes the insurance won't pay if ordered by a nurse practitioner so if he wanted to double check with them to be sure it would be covered if I ordered it that would be fine and I will order

## 2017-07-17 ENCOUNTER — TELEPHONE (OUTPATIENT)
Dept: INTERNAL MEDICINE | Facility: CLINIC | Age: 67
End: 2017-07-17

## 2017-07-17 RX ORDER — INSULIN LISPRO 100 [IU]/ML
INJECTION, SOLUTION INTRAVENOUS; SUBCUTANEOUS
Qty: 46 ML | Refills: 1 | Status: SHIPPED | OUTPATIENT
Start: 2017-07-17 | End: 2018-09-24 | Stop reason: SDUPTHER

## 2017-07-17 NOTE — TELEPHONE ENCOUNTER
Pt's brother called and said that the pt has had multiple hard falls in the past few weeks and would like to know if he could get a prescription for a walker to help him when walking around to try and prevent some of his falls. Please advise.

## 2017-08-07 RX ORDER — FUROSEMIDE 20 MG/1
TABLET ORAL
Qty: 135 TABLET | Refills: 1 | Status: SHIPPED | OUTPATIENT
Start: 2017-08-07 | End: 2017-11-27 | Stop reason: SDUPTHER

## 2017-08-10 ENCOUNTER — HOSPITAL ENCOUNTER (OUTPATIENT)
Dept: CARDIOLOGY | Facility: HOSPITAL | Age: 67
Setting detail: RECURRING SERIES
Discharge: HOME | End: 2017-08-10

## 2017-08-10 PROCEDURE — 36416 COLLJ CAPILLARY BLOOD SPEC: CPT

## 2017-08-10 PROCEDURE — 85610 PROTHROMBIN TIME: CPT

## 2017-08-26 DIAGNOSIS — F32.A DEPRESSION: ICD-10-CM

## 2017-08-28 RX ORDER — CITALOPRAM 20 MG/1
TABLET ORAL
Qty: 30 TABLET | Refills: 5 | Status: SHIPPED | OUTPATIENT
Start: 2017-08-28 | End: 2017-10-06 | Stop reason: SDUPTHER

## 2017-09-07 ENCOUNTER — HOSPITAL ENCOUNTER (OUTPATIENT)
Dept: CARDIOLOGY | Facility: HOSPITAL | Age: 67
Setting detail: RECURRING SERIES
Discharge: HOME | End: 2017-09-07

## 2017-09-07 PROCEDURE — 36416 COLLJ CAPILLARY BLOOD SPEC: CPT

## 2017-09-07 PROCEDURE — 85610 PROTHROMBIN TIME: CPT

## 2017-10-06 ENCOUNTER — OFFICE VISIT (OUTPATIENT)
Dept: INTERNAL MEDICINE | Facility: CLINIC | Age: 67
End: 2017-10-06

## 2017-10-06 VITALS
WEIGHT: 221 LBS | HEART RATE: 91 BPM | SYSTOLIC BLOOD PRESSURE: 102 MMHG | BODY MASS INDEX: 29.97 KG/M2 | OXYGEN SATURATION: 95 % | DIASTOLIC BLOOD PRESSURE: 68 MMHG | TEMPERATURE: 97.6 F

## 2017-10-06 DIAGNOSIS — R27.0 ATAXIA: Primary | ICD-10-CM

## 2017-10-06 DIAGNOSIS — I95.1 HYPOTENSION, POSTURAL: ICD-10-CM

## 2017-10-06 DIAGNOSIS — Z23 NEED FOR IMMUNIZATION AGAINST INFLUENZA: ICD-10-CM

## 2017-10-06 DIAGNOSIS — R29.6 FALLING: ICD-10-CM

## 2017-10-06 DIAGNOSIS — I25.5 ISCHEMIC CARDIOMYOPATHY: ICD-10-CM

## 2017-10-06 DIAGNOSIS — I10 ESSENTIAL HYPERTENSION: ICD-10-CM

## 2017-10-06 PROCEDURE — G0008 ADMIN INFLUENZA VIRUS VAC: HCPCS | Performed by: FAMILY MEDICINE

## 2017-10-06 PROCEDURE — 99214 OFFICE O/P EST MOD 30 MIN: CPT | Performed by: FAMILY MEDICINE

## 2017-10-06 NOTE — PROGRESS NOTES
Subjective   Andreas Rubin is a 66 y.o. male.     Chief Complaint   Patient presents with   • Falling         History of Present Illness    patient is currently by his brother with the main problem being history of orthostatic hypertension he is a reduction of carvedilol to half of 12.5 mg twice a dayhe has had some hard falls.  Doesn't catch himself at all necessary secondary to affect these also on anticoagulation.  We discussed getting the home physical therapy to evaluate him and also get him an appointment with neurology Dr. Irene.  Certainly this could be orthostatic hypotension with his history of cardiomyopathy.  He also has a walker which she uses.  He is due for a flu shot today and he appears to be up-to-date on Pneumovax although most of those were [facility].          The following portions of the patient's history were reviewed and updated as appropriate: allergies, current medications, past social history and problem list.    Review of Systems   HENT: Negative.    Eyes: Negative.    Respiratory: Negative.    Cardiovascular: Negative.    Gastrointestinal: Negative.    Endocrine: Negative.    Genitourinary: Negative.    Musculoskeletal: Positive for gait problem.   Skin: Negative.    Allergic/Immunologic: Negative.    Neurological: Positive for weakness.   Hematological: Negative.    Psychiatric/Behavioral: Negative.        Objective   Vitals:    10/06/17 1118   BP: 102/68   Pulse: 91   Temp: 97.6 °F (36.4 °C)   SpO2: 95%     Physical Exam   Constitutional: He is oriented to person, place, and time. He appears well-developed and well-nourished.   HENT:   Head: Normocephalic and atraumatic.   Right Ear: Tympanic membrane and external ear normal.   Left Ear: Tympanic membrane and external ear normal.   Nose: Nose normal.   Mouth/Throat: Oropharynx is clear and moist.   Eyes: Conjunctivae and EOM are normal. Pupils are equal, round, and reactive to light.   Neck: Normal range of motion. Neck  supple. No JVD present. No thyromegaly present.   Cardiovascular: Normal rate, regular rhythm, normal heart sounds and intact distal pulses.    Pulmonary/Chest: Effort normal and breath sounds normal.   Abdominal: Soft. Bowel sounds are normal.   Musculoskeletal: Normal range of motion.   Lymphadenopathy:     He has no cervical adenopathy.   Neurological: He is alert and oriented to person, place, and time. No cranial nerve deficit. Coordination abnormal.   Skin: Skin is warm and dry. No rash noted.   Psychiatric: He has a normal mood and affect. His behavior is normal. Judgment and thought content normal.   Vitals reviewed.      Assessment/Plan   Problem List Items Addressed This Visit        Cardiovascular and Mediastinum    Hypotension, postural    Cardiomyopathy    Essential hypertension      Other Visit Diagnoses     Ataxia    -  Primary    Relevant Orders    Ambulatory Referral to Physical Therapy Evaluate and treat    Ambulatory Referral to Neurology    Falling        Relevant Orders    Ambulatory Referral to Physical Therapy Evaluate and treat    Ambulatory Referral to Neurology    Need for immunization against influenza        Relevant Orders    Flu Vaccine High Dose PF 65YR+      Plan: Continue [id number] mg half tablet twice a day with follow-up with [facility].  Also referral to neurology Dr. Louis  Recheck in about a month.  Flu shot is given today.

## 2017-10-10 ENCOUNTER — HOSPITAL ENCOUNTER (OUTPATIENT)
Dept: CARDIOLOGY | Facility: HOSPITAL | Age: 67
Setting detail: RECURRING SERIES
Discharge: HOME | End: 2017-10-10

## 2017-10-10 PROCEDURE — 85610 PROTHROMBIN TIME: CPT

## 2017-10-10 PROCEDURE — 36416 COLLJ CAPILLARY BLOOD SPEC: CPT

## 2017-10-12 ENCOUNTER — CLINICAL SUPPORT (OUTPATIENT)
Dept: INTERNAL MEDICINE | Facility: CLINIC | Age: 67
End: 2017-10-12

## 2017-10-12 DIAGNOSIS — Z23 NEED FOR PNEUMOCOCCAL VACCINE: Primary | ICD-10-CM

## 2017-10-12 PROCEDURE — G0009 ADMIN PNEUMOCOCCAL VACCINE: HCPCS | Performed by: FAMILY MEDICINE

## 2017-10-12 PROCEDURE — 90732 PPSV23 VACC 2 YRS+ SUBQ/IM: CPT | Performed by: FAMILY MEDICINE

## 2017-10-12 RX ORDER — POTASSIUM CHLORIDE 750 MG/1
TABLET, FILM COATED, EXTENDED RELEASE ORAL
Qty: 90 TABLET | Refills: 3 | Status: SHIPPED | OUTPATIENT
Start: 2017-10-12 | End: 2019-03-12 | Stop reason: SDUPTHER

## 2017-10-18 ENCOUNTER — CLINICAL SUPPORT NO REQUIREMENTS (OUTPATIENT)
Dept: CARDIOLOGY | Facility: CLINIC | Age: 67
End: 2017-10-18

## 2017-10-18 ENCOUNTER — OFFICE VISIT (OUTPATIENT)
Dept: CARDIOLOGY | Facility: CLINIC | Age: 67
End: 2017-10-18

## 2017-10-18 ENCOUNTER — TELEPHONE (OUTPATIENT)
Dept: CARDIOLOGY | Facility: CLINIC | Age: 67
End: 2017-10-18

## 2017-10-18 VITALS
WEIGHT: 215 LBS | SYSTOLIC BLOOD PRESSURE: 122 MMHG | DIASTOLIC BLOOD PRESSURE: 80 MMHG | BODY MASS INDEX: 29.12 KG/M2 | HEIGHT: 72 IN | HEART RATE: 70 BPM

## 2017-10-18 DIAGNOSIS — I10 ESSENTIAL HYPERTENSION: ICD-10-CM

## 2017-10-18 DIAGNOSIS — Z95.0 S/P BIVENTRICULAR CARDIAC PACEMAKER PROCEDURE: ICD-10-CM

## 2017-10-18 DIAGNOSIS — I48.0 PAROXYSMAL ATRIAL FIBRILLATION (HCC): ICD-10-CM

## 2017-10-18 DIAGNOSIS — R42 DIZZINESS: ICD-10-CM

## 2017-10-18 DIAGNOSIS — I25.5 ISCHEMIC CARDIOMYOPATHY: Primary | ICD-10-CM

## 2017-10-18 DIAGNOSIS — I95.1 HYPOTENSION, POSTURAL: ICD-10-CM

## 2017-10-18 DIAGNOSIS — I25.10 CORONARY ARTERY DISEASE INVOLVING NATIVE CORONARY ARTERY OF NATIVE HEART WITHOUT ANGINA PECTORIS: ICD-10-CM
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
## 2017-10-18 PROCEDURE — 99214 OFFICE O/P EST MOD 30 MIN: CPT | Performed by: NURSE PRACTITIONER

## 2017-10-18 PROCEDURE — 93000 ELECTROCARDIOGRAM COMPLETE: CPT | Performed by: NURSE PRACTITIONER

## 2017-10-18 RX ORDER — CARVEDILOL 6.25 MG/1
6.25 TABLET ORAL 2 TIMES DAILY
Qty: 180 TABLET | Refills: 3
Start: 2017-10-18 | End: 2019-05-08 | Stop reason: HOSPADM

## 2017-10-18 NOTE — PROGRESS NOTES
"Date of Office Visit: 10/18/2017  Encounter Provider: LAM Brown  Place of Service: [facility] CARDIOLOGY  Patient Name: Andreas Rubin  :1950    Chief Complaint   Patient presents with   • Cardiomyopathy     CRT-P device check   • Dizziness   :     HPI: Andreas Rubin is a 66 y.o. male who is a patient of Dr. James's that I saw in July for the first time. He is accompanied by his brother. He has a past medical history of paroxysmal atrial fib (chronic warfarin), non-ischemic cardiomyopathy, s/p CRT-P,  diabetes, CAD status post stent to the mid RCA in , sleep apnea intolerant to CPAP so he uses nocturnal O2. He is on warfarin chronically. His most recent echo was in 2015 which showed severely dilated left ventricle with an EF of 28%, severe global hypokinesis, severely dilated left and right atrium, moderate to severely dilated right ventricle with decreased RV systolic function trace to mild AI, mild MR and mild TR.        In July he was seen for complaints of lightheadedness that were felt to be at least partially due to orthostatic hypotension. His blood pressures were checked in the office and he was noted to be orthostatic and did complain of lightheadedness with the orthostatic checks. We decreased his carvedilol and he returns today for follow up. He was just seen by Dr. Dsouza on 10/6 for continued lightheadedness and ataxic gate, despite decreasing his carvedilol. He has had some \"hard falls.\" He has been referred to Dr. Irene, neurology and home PT has been ordered. PT has seen him once this week for the initial evaluation and he has appointment with Dr. Irene at the end of this month. He does now have a walker to use. His brother says that decreasing the carvedilol has really made no difference is his complaints of dizziness/lightheadedness. He has fallen about 4-5 times since he was last here. His brother says it is not right after he gets up " but maybe after he has walked 25ft or more. He says he just falls, usually to the right and makes no attempt to catch himself. He has not had any significant injuries with the falls but he has had some bruising since he is on warfarin. He denies any chest pain, shortness of breath, PND, orthopnea or edema. However his brother says that he does notice that he is short of breath with exertion.      Device interrogation today showed normal CRT-P function. Testing within normal limits.  He is 99% Apaced and 99% BiV paced. He has had 1- 8 beat run of VT, rate 182 bpm and 3 episodes of AT/AF, the longest was 27 minutes on 9/25/17.           Past Medical History:   Diagnosis Date   • Allergic rhinitis    • ASHD (arteriosclerotic heart disease)    • Atrial fibrillation    • AV block    • Cardiomyopathy    • CHF (congestive heart failure)    • Depression    • DM type 2 (diabetes mellitus, type 2)    • Dyspnea    • Edema    • Encounter for special screening examination for neoplasm of prostate    • Fatigue    • Hyperlipidemia    • Hypertension    • Hypotension    • Imbalance    • Intermittent claudication    • PALMER (obstructive sleep apnea)    • Wound, open, leg        Past Surgical History:   Procedure Laterality Date   • BUNIONECTOMY      BILATERAL FEET   • CAROTID STENT      EF=10-15%LEFT MAIN NORMAL , MILD LUMINAL IRR OF LED AND 20% DISEASE OF THE CIRCUMFLEX 80% LESIONIN THE PROX RCA THAT WAS ACTUALLY NONDOMINANT 2.5 X 18 MM VISION BARE METAL STENT IN THE MID RCA    • CATARACT EXTRACTION Bilateral    • COLONOSCOPY N/A 3/3/2017    Procedure: COLONOSCOPY TO CECUM;  Surgeon: Benton Castellanos MD;  Location: [facility] ENDOSCOPY;  Service:    • CORONARY ANGIOPLASTY WITH STENT PLACEMENT      EF = 10-15%.  Left main was normal, mild luminal irregularities of LAD, and 20% disease of the circumflex.  80% lesion in the prox RCA that was actually nondominant.  2.5 x 18mm Vision bare metal stent in the mid RCA.   • ELBOW PROCEDURE       REMOVAL OF NODULE ON LEFT ELBOW   • KNEE ACL RECONSTRUCTION      LEFT KNEE   • PACEMAKER IMPLANTATION     • PACEMAKER REPLACEMENT         Social History     Social History   • Marital status: Single     Spouse name: N/A   • Number of children: N/A   • Years of education: N/A     Occupational History   • Not on file.     Social History Main Topics   • Smoking status: Never Smoker   • Smokeless tobacco: Never Used      Comment: caffeine use   • Alcohol use Yes      Comment: ocasional   • Drug use: No   • Sexual activity: Not on file     Other Topics Concern   • Not on file     Social History Narrative       Family History   Problem Relation Age of Onset   • Lung cancer Mother    • Stroke Father    • Heart attack Father    • Lung cancer Father    • Diabetes Brother    • Hyperlipidemia Brother    • Diabetes Brother    • Throat cancer Brother        Review of Systems   Constitution: Negative for chills, fever, malaise/fatigue, weight gain and weight loss.   HENT: Negative for ear pain, hearing loss, nosebleeds and sore throat.    Eyes: Negative for double vision, pain and visual disturbance.   Cardiovascular: Positive for dyspnea on exertion. Negative for chest pain, irregular heartbeat, leg swelling, near-syncope, orthopnea, palpitations, paroxysmal nocturnal dyspnea and syncope.   Respiratory: Negative for cough, shortness of breath, sleep disturbances due to breathing, snoring and wheezing.    Endocrine: Negative for cold intolerance, heat intolerance and polyuria.   Hematologic/Lymphatic: Bruises/bleeds easily.   Skin: Negative for itching and rash.   Musculoskeletal: Positive for falls. Negative for joint pain, joint swelling and myalgias.   Gastrointestinal: Negative for abdominal pain, diarrhea, melena, nausea and vomiting.   Genitourinary: Negative for frequency, hematuria and hesitancy.   Neurological: Positive for dizziness and light-headedness. Negative for excessive daytime sleepiness, headaches, numbness,  paresthesias and seizures.        Ataxic gait, falls   Psychiatric/Behavioral: Negative for altered mental status and depression.   Allergic/Immunologic: Negative.    All other systems reviewed and are negative.      Allergies   Allergen Reactions   • Codeine    • Codeine Sulfate          Current Outpatient Prescriptions:   •  JERRY MICROLET LANCETS lancets, daily., Disp: , Rfl:   •  citalopram (CeleXA) 20 MG tablet, TAKE 1 TABLET BY MOUTH EVERYDAY., Disp: 30 tablet, Rfl: 4  •  furosemide (LASIX) 20 MG tablet, TAKE 1 TABLET BY MOUTH DAILY. ALTERNATING 20 AND 40 MG TABLETS DAILY, Disp: 135 tablet, Rfl: 1  •  Glucose Blood (JERRY CONTOUR NEXT TEST VI), 4 (four) times a day., Disp: , Rfl:   •  glucose blood (ONE TOUCH ULTRA TEST) test strip, TESTING BS 1 X DAY DX CODE E11.8, Disp: 100 each, Rfl: 1  •  HUMALOG KWIKPEN 100 UNIT/ML solution pen-injector, INJECT 15 UNITS THREE TIMES A DAY WITH MEAL, Disp: 46 mL, Rfl: 1  •  KLOR-CON 10 MEQ CR tablet, TAKE 1 TABLET BY MOUTH EVERY DAY, Disp: 90 tablet, Rfl: 3  •  LEVEMIR FLEXTOUCH 100 UNIT/ML injection, INJECT 30 UNITS TWICE A DAY, Disp: 58 mL, Rfl: 1  •  metFORMIN (GLUCOPHAGE) 500 MG tablet, Take 1 tablet by mouth 2 (Two) Times a Day With Meals., Disp: 180 tablet, Rfl: 1  •  Multiple Vitamins-Minerals (MULTI FOR HIM) capsule, Take 1 tablet/day by mouth daily., Disp: , Rfl:   •  nitroglycerin (NITROSTAT) 0.4 MG SL tablet, Place under the tongue., Disp: , Rfl:   •  pravastatin (PRAVACHOL) 40 MG tablet, Take 1 tablet by mouth Every Night., Disp: 90 tablet, Rfl: 1  •  rOPINIRole (REQUIP) 1 MG tablet, Take 1 tablet by mouth., Disp: , Rfl:   •  spironolactone (ALDACTONE) 25 MG tablet, TAKE 1 TABLET DAILY., Disp: 30 tablet, Rfl: 5  •  vitamin E ([employer] VITAMIN E) 400 UNIT capsule, Take  by mouth., Disp: , Rfl:   •  carvedilol (COREG) 6.25 MG tablet, Take 1 tablet by mouth 2 (Two) Times a Day., Disp: 180 tablet, Rfl: 3     Objective:     Vitals:    10/18/17 0915   BP: 122/80   Pulse:  "70   Weight: 215 lb (97.5 kg)   Height: 72\" (182.9 cm)     Body mass index is 29.16 kg/(m^2).    PHYSICAL EXAM:    Vitals Reviewed.   General Appearance: No acute distress, well developed and well nourished.   Eyes: Conjunctiva and lids: No erythema, swelling, or discharge. Sclera non-icteric.   HENT: Atraumatic, normocephalic. External eyes, ears, and nose normal. No hearing loss noted. Mucous membranes normal. Lips not cyanotic. Neck supple with no tenderness.  Respiratory: No signs of respiratory distress. Respiration rhythm and depth normal.   Clear to auscultation. No rales, crackles, rhonchi, or wheezing auscultated.   Cardiovascular:  Jugular Venous Pressure: Normal  Heart Rate and Rhythm: Normal, Heart Sounds: Normal S1 and S2. No S3 or S4 noted.  Murmurs: No murmurs noted. No rubs, thrills, or gallops.   Arterial Pulses:  Posterior tibialis and dorsalis pedis pulses normal.   Lower Extremities: No edema noted.  Gastrointestinal:  Abdomen soft, non-distended, non-tender. Normal bowel sounds.  Musculoskeletal: Normal movement of extremities  Skin and Nails: General appearance normal. No pallor, cyanosis, diaphoresis. Skin temperature normal. No clubbing of fingernails.   Psychiatric: Patient alert and oriented to person, place, and time. Speech and behavior appropriate.       ECG 12 Lead  Date/Time: 10/18/2017 10:46 AM  Performed by: LANCE GEIGER  Authorized by: LANCE GEIGER   Comparison: compared with previous ECG from 2017  Similar to previous ECG  Rhythm: paced  Pacin% capture  Clinical impression: abnormal ECG              Assessment:       Diagnosis Plan   1. Ischemic cardiomyopathy     2. S/P biventricular cardiac pacemaker procedure     3. Coronary artery disease involving native coronary artery of native heart without angina pectoris     4. Essential hypertension     5. Hypotension, postural     6. Paroxysmal atrial fibrillation     7. Fall, initial encounter     8. Dizziness          "   Plan:       1. & 2. ICM-s/p CRT-P. Device check within normal limits.     3. CAD, stable. He has had no chest pain    4. & 5. HTN & postural hypotension. Decrease carvedilol to 6.25mg twice daily.    6. & &. Falls & dizziness. Some of this is likely due to orthostatic hypotension but probably not all of it. He is seeing Dr. Irene, neurology soon and PT has evaluated and are going to starting working with him on his gait.    Dr. James and I both saw Mr. Card. His falls and dizziness are concerning with him being on warfarin. He has only had rare short episodes of PAF. We both feel that the the risks of warfarin outweigh the benefit at this time and have recommended he stop it. We discussed this with him and his brother and they are agreeable. I am also going to decrease his carvedilol even more and see if this helps at all. Will have him return to see Dr. James in 6 months and do remote device check in 3 months.     As always, it has been a pleasure to participate in your patient's care.      Sincerely,         LAM Shaw

## 2017-10-18 NOTE — TELEPHONE ENCOUNTER
Reviewed with Dr. James, given his hx of CAD and stents in the past since we are stopping his warfarin we are going to have his add aspirin 81mg daily to his medication regimen. I called and spoke with his brother Johann and gave instructions.

## 2017-10-31 ENCOUNTER — TRANSCRIBE ORDERS (OUTPATIENT)
Dept: ADMINISTRATIVE | Facility: HOSPITAL | Age: 67
End: 2017-10-31

## 2017-10-31 ENCOUNTER — LAB (OUTPATIENT)
Dept: LAB | Facility: HOSPITAL | Age: 67
End: 2017-10-31

## 2017-10-31 DIAGNOSIS — G32.81: Primary | ICD-10-CM

## 2017-10-31 DIAGNOSIS — G32.81: ICD-10-CM

## 2017-10-31 LAB
T-UPTAKE NFR SERPL: 1.1 TBI (ref 0.8–1.3)
T4 SERPL-MCNC: 5.28 MCG/DL (ref 4.5–11.7)
TSH SERPL DL<=0.05 MIU/L-ACNC: 3.34 MIU/ML (ref 0.27–4.2)
VIT B12 BLD-MCNC: 534 PG/ML (ref 211–946)

## 2017-10-31 PROCEDURE — 84443 ASSAY THYROID STIM HORMONE: CPT

## 2017-10-31 PROCEDURE — 86592 SYPHILIS TEST NON-TREP QUAL: CPT

## 2017-10-31 PROCEDURE — 84479 ASSAY OF THYROID (T3 OR T4): CPT

## 2017-10-31 PROCEDURE — 84436 ASSAY OF TOTAL THYROXINE: CPT

## 2017-10-31 PROCEDURE — 36415 COLL VENOUS BLD VENIPUNCTURE: CPT

## 2017-10-31 PROCEDURE — 82607 VITAMIN B-12: CPT

## 2017-11-01 LAB — RPR SER QL: NORMAL

## 2017-11-21 ENCOUNTER — TRANSCRIBE ORDERS (OUTPATIENT)
Dept: ADMINISTRATIVE | Facility: HOSPITAL | Age: 67
End: 2017-11-21

## 2017-11-21 DIAGNOSIS — G32.81: Primary | ICD-10-CM

## 2017-11-25 DIAGNOSIS — I10 ESSENTIAL HYPERTENSION: ICD-10-CM

## 2017-11-27 ENCOUNTER — HOSPITAL ENCOUNTER (OUTPATIENT)
Dept: CT IMAGING | Facility: HOSPITAL | Age: 67
Discharge: HOME | End: 2017-11-27
Admitting: PSYCHIATRY & NEUROLOGY

## 2017-11-27 DIAGNOSIS — G32.81: ICD-10-CM

## 2017-11-27 LAB — CREAT BLDA-MCNC: 0.8 MG/DL (ref 0.6–1.3)

## 2017-11-27 PROCEDURE — 70470 CT HEAD/BRAIN W/O & W/DYE: CPT

## 2017-11-27 PROCEDURE — 0 IOPAMIDOL PER 1 ML: Performed by: PSYCHIATRY & NEUROLOGY

## 2017-11-27 PROCEDURE — 82565 ASSAY OF CREATININE: CPT

## 2017-11-27 RX ORDER — FUROSEMIDE 40 MG/1
TABLET ORAL
Qty: 30 TABLET | Refills: 5 | Status: SHIPPED | OUTPATIENT
Start: 2017-11-27 | End: 2018-06-14 | Stop reason: SDUPTHER

## 2017-11-27 RX ADMIN — IOPAMIDOL 50 ML: 755 INJECTION, SOLUTION INTRAVENOUS at 08:45

## 2017-12-14 ENCOUNTER — OFFICE VISIT (OUTPATIENT)
Dept: ENDOCRINOLOGY | Age: 67
End: 2017-12-14

## 2017-12-14 VITALS
WEIGHT: 226 LBS | HEIGHT: 72 IN | HEART RATE: 87 BPM | BODY MASS INDEX: 30.61 KG/M2 | SYSTOLIC BLOOD PRESSURE: 114 MMHG | OXYGEN SATURATION: 98 % | DIASTOLIC BLOOD PRESSURE: 72 MMHG

## 2017-12-14 DIAGNOSIS — Z79.4 TYPE 2 DIABETES MELLITUS WITH MILD NONPROLIFERATIVE RETINOPATHY OF BOTH EYES, WITH LONG-TERM CURRENT USE OF INSULIN, MACULAR EDEMA PRESENCE UNSPECIFIED (HCC): ICD-10-CM

## 2017-12-14 DIAGNOSIS — I25.10 CORONARY ARTERY DISEASE INVOLVING NATIVE CORONARY ARTERY OF NATIVE HEART WITHOUT ANGINA PECTORIS: ICD-10-CM

## 2017-12-14 DIAGNOSIS — G47.33 OBSTRUCTIVE SLEEP APNEA SYNDROME: ICD-10-CM

## 2017-12-14 DIAGNOSIS — E11.3293 TYPE 2 DIABETES MELLITUS WITH MILD NONPROLIFERATIVE RETINOPATHY OF BOTH EYES, WITH LONG-TERM CURRENT USE OF INSULIN, MACULAR EDEMA PRESENCE UNSPECIFIED (HCC): ICD-10-CM

## 2017-12-14 DIAGNOSIS — I10 ESSENTIAL HYPERTENSION: ICD-10-CM

## 2017-12-14 DIAGNOSIS — E78.5 HYPERLIPIDEMIA, UNSPECIFIED HYPERLIPIDEMIA TYPE: ICD-10-CM

## 2017-12-14 DIAGNOSIS — E11.8 TYPE 2 DIABETES MELLITUS WITH COMPLICATION, WITH LONG-TERM CURRENT USE OF INSULIN (HCC): Primary | ICD-10-CM

## 2017-12-14 DIAGNOSIS — Z79.4 TYPE 2 DIABETES MELLITUS WITH COMPLICATION, WITH LONG-TERM CURRENT USE OF INSULIN (HCC): Primary | ICD-10-CM

## 2017-12-14 DIAGNOSIS — Z95.0 S/P BIVENTRICULAR CARDIAC PACEMAKER PROCEDURE: ICD-10-CM

## 2017-12-14 DIAGNOSIS — Z86.73 HISTORY OF CEREBELLAR STROKE: ICD-10-CM

## 2017-12-14 DIAGNOSIS — Z95.820 S/P ANGIOPLASTY WITH STENT: ICD-10-CM

## 2017-12-14 PROCEDURE — 99214 OFFICE O/P EST MOD 30 MIN: CPT | Performed by: INTERNAL MEDICINE

## 2017-12-14 NOTE — PROGRESS NOTES
Subjective   Andreas Rubin is a 67 y.o. male.     HPI Comments: F/y for dm 2,hyperlipididemia,hypertension,CAD,sleep apnea / testing bs 1  x day / last dm eye exam 5/31/17 with dr Wade / last dm foot exa, 7/3/17 with dr Carpenter / pt had flu vaccine and pneumo @[facility]    Diabetes     Hyperlipidemia   Associated symptoms include shortness of breath.   Hypertension   Associated symptoms include shortness of breath. Identifiable causes of hypertension include sleep apnea.   Sleep Apnea   Associated symptoms include joint swelling ( hands and knees ) and numbness ( feet and legs ).   Coronary Artery Disease   Symptoms include shortness of breath. Risk factors include hyperlipidemia.      Patient is a 67-year-old male who came in for followup. He has type 2 diabetes mellitus since 2001. He started on insulin in 2008. C-peptide levels were detectable and KRISTAL antibody was negative. He has been on Levemir 30 units twice a day and Humalog 15 units twice a day and metformin 500 mg twice a day. Fasting blood sugar runs between 119-285. He has denies hypoglycemic episodes.  He has gained 9 pounds since 7/17.  His last meal was last night.      His last eye examination was in 5/17 and there was mild nonproliferative diabetic retinopathy. He has microalbuminuria on urine sample taken last 5/15. He has intermittent numbness on both feet but no pain.      He has hyperlipidemia and was taken off Lipitor because he was having leg cramps. He has been on pravastatin 60 mg every evening. He has nocturnal leg cramps which is improved with Requip.        He has hypertension and has been on Coreg, Lasix, and spironolactone.        He has known coronary artery disease and had previous angioplasty with stent. He was admitted last June 2016 for congestive heart failure. He denies any chest pain. He has SOB with exertion but no orthopnea or PND. He has a history of atrial fibrillation and was taken off warfarin because of recurrent falls.  He  "denies any palpitations. He is on ASA 81 mg/day.    He was seen by Dr. Irene for gait instability.  CT of the brain done in November 2017 showed a remote infarct in the right cerebellum and mild small vessel ischemic change.  He is using a walker at home.        He has sleep apnea and is unable to tolerate CPAP. He has been off nocturnal oxygen because he has not followed up with Dr. Barnes.   The following portions of the patient's history were reviewed and updated as appropriate: allergies, current medications, past family history, past medical history, past social history, past surgical history and problem list.    Review of Systems   Constitutional: Negative.    HENT: Negative.    Eyes: Negative.    Respiratory: Positive for shortness of breath.    Cardiovascular: Negative.    Gastrointestinal: Negative for diarrhea.   Endocrine: Negative.    Genitourinary: Negative for frequency and urgency.   Musculoskeletal: Positive for joint swelling ( hands and knees ).   Skin: Negative.    Allergic/Immunologic: Negative.    Neurological: Positive for light-headedness and numbness ( feet and legs ).   Hematological: Negative.    Psychiatric/Behavioral: Positive for sleep disturbance (sleep apnea unable to tolerate c pap machine ).       Objective      Vitals:    12/14/17 0914   BP: 114/72   BP Location: Left arm   Patient Position: Sitting   Cuff Size: Large Adult   Pulse: 87   SpO2: 98%   Weight: 103 kg (226 lb)   Height: 182.9 cm (72.01\")     Physical Exam   Constitutional: He is oriented to person, place, and time. He appears well-developed and well-nourished. No distress.   HENT:   Head: Normocephalic.   Nose: Nose normal.   Mouth/Throat: No oropharyngeal exudate.   Eyes: Conjunctivae and EOM are normal. Right eye exhibits no discharge. Left eye exhibits no discharge. No scleral icterus.   Neck: Neck supple. No JVD present. No tracheal deviation present. No thyromegaly present.   Cardiovascular: Normal rate, regular " rhythm, normal heart sounds and intact distal pulses.  Exam reveals no gallop and no friction rub.    No murmur heard.  Pulmonary/Chest: Effort normal and breath sounds normal. No respiratory distress. He has no wheezes. He has no rales. He exhibits no tenderness.   Abdominal: He exhibits no distension and no mass. There is no tenderness. No hernia.   Musculoskeletal: Normal range of motion. He exhibits no edema, tenderness or deformity.   Lymphadenopathy:     He has no cervical adenopathy.   Neurological: He is alert and oriented to person, place, and time. He displays normal reflexes. Coordination normal.   Intact light touch   Skin: Skin is warm and dry. No rash noted. No erythema. No pallor.   Psychiatric: He has a normal mood and affect. His behavior is normal.     Hospital Outpatient Visit on 11/27/2017   Component Date Value Ref Range Status   • Creatinine 11/27/2017 0.80  0.60 - 1.30 mg/dL Final    Serial Number: [id number]:  [id number]     Assessment/Plan   Andreas was seen today for diabetes, hyperlipidemia, hypertension, sleep apnea and coronary artery disease.    Diagnoses and all orders for this visit:    Type 2 diabetes mellitus with complication, with long-term current use of insulin  -     Comprehensive Metabolic Panel  -     Hemoglobin A1c  -     TSH  -     T4, Free  -     Microalbumin / Creatinine Urine Ratio - Urine, Clean Catch  -     metFORMIN (GLUCOPHAGE) 500 MG tablet; Take 1 tablet by mouth 2 (Two) Times a Day With Meals.    Type 2 diabetes mellitus with mild nonproliferative retinopathy of both eyes, with long-term current use of insulin, macular edema presence unspecified    Coronary artery disease involving native coronary artery of native heart without angina pectoris    Essential hypertension    Hyperlipidemia, unspecified hyperlipidemia type  -     Lipid Panel  -     TSH  -     T4, Free    S/P angioplasty with stent    S/P biventricular cardiac pacemaker procedure    History of  cerebellar stroke    Obstructive sleep apnea syndrome       continue Levemir and Humalog and metformin.     check hemoglobin A1c and urine microalbumin.    Continue pravastatin 60 mg once a day  Continue Coreg, Lasix and spironolactone.  Follow-up with Dr. Adan Barnes and Dr. Irene    Send copy of my notes and labs to Dr. Dsouza, Dr. Adan Barnes, Dr. Irene    RTC 4 mos.

## 2017-12-15 LAB
ALBUMIN SERPL-MCNC: 4.3 G/DL (ref 3.5–5.2)
ALBUMIN/CREAT UR: 6.2 MG/G CREAT (ref 0–30)
ALBUMIN/GLOB SERPL: 1.4 G/DL
ALP SERPL-CCNC: 86 U/L (ref 39–117)
ALT SERPL-CCNC: 21 U/L (ref 1–41)
AST SERPL-CCNC: 20 U/L (ref 1–40)
BILIRUB SERPL-MCNC: 0.8 MG/DL (ref 0.1–1.2)
BUN SERPL-MCNC: 17 MG/DL (ref 8–23)
BUN/CREAT SERPL: 19.5 (ref 7–25)
CALCIUM SERPL-MCNC: 9.6 MG/DL (ref 8.6–10.5)
CHLORIDE SERPL-SCNC: 96 MMOL/L (ref 98–107)
CHOLEST SERPL-MCNC: 239 MG/DL (ref 0–200)
CO2 SERPL-SCNC: 31.4 MMOL/L (ref 22–29)
CREAT SERPL-MCNC: 0.87 MG/DL (ref 0.76–1.27)
CREAT UR-MCNC: 105.4 MG/DL
GFR SERPLBLD CREATININE-BSD FMLA CKD-EPI: 106 ML/MIN/1.73
GFR SERPLBLD CREATININE-BSD FMLA CKD-EPI: 88 ML/MIN/1.73
GLOBULIN SER CALC-MCNC: 3 GM/DL
GLUCOSE SERPL-MCNC: 257 MG/DL (ref 65–99)
HBA1C MFR BLD: 7.8 % (ref 4.8–5.6)
HDLC SERPL-MCNC: 43 MG/DL (ref 40–60)
INTERPRETATION: NORMAL
LDLC SERPL CALC-MCNC: 145 MG/DL (ref 0–100)
Lab: NORMAL
MICROALBUMIN UR-MCNC: 6.5 UG/ML
POTASSIUM SERPL-SCNC: 4.7 MMOL/L (ref 3.5–5.2)
PROT SERPL-MCNC: 7.3 G/DL (ref 6–8.5)
SODIUM SERPL-SCNC: 138 MMOL/L (ref 136–145)
T4 FREE SERPL-MCNC: 1.04 NG/DL (ref 0.93–1.7)
TRIGL SERPL-MCNC: 256 MG/DL (ref 0–150)
TSH SERPL DL<=0.005 MIU/L-ACNC: 2.16 MIU/ML (ref 0.27–4.2)
VLDLC SERPL CALC-MCNC: 51.2 MG/DL (ref 5–40)

## 2017-12-18 RX ORDER — PRAVASTATIN SODIUM 80 MG/1
80 TABLET ORAL NIGHTLY
Qty: 90 TABLET | Refills: 1 | Status: SHIPPED | OUTPATIENT
Start: 2017-12-18 | End: 2018-06-13 | Stop reason: SDUPTHER

## 2017-12-21 ENCOUNTER — OFFICE VISIT (OUTPATIENT)
Dept: INTERNAL MEDICINE | Facility: CLINIC | Age: 67
End: 2017-12-21

## 2017-12-21 VITALS
TEMPERATURE: 96.6 F | OXYGEN SATURATION: 97 % | SYSTOLIC BLOOD PRESSURE: 102 MMHG | BODY MASS INDEX: 30.24 KG/M2 | WEIGHT: 223 LBS | DIASTOLIC BLOOD PRESSURE: 64 MMHG | HEART RATE: 83 BPM

## 2017-12-21 DIAGNOSIS — G11.9 CEREBELLAR ATAXIA (HCC): Primary | ICD-10-CM

## 2017-12-21 DIAGNOSIS — Z79.4 TYPE 2 DIABETES MELLITUS WITH COMPLICATION, WITH LONG-TERM CURRENT USE OF INSULIN (HCC): ICD-10-CM

## 2017-12-21 DIAGNOSIS — I10 ESSENTIAL HYPERTENSION: ICD-10-CM

## 2017-12-21 DIAGNOSIS — E11.8 TYPE 2 DIABETES MELLITUS WITH COMPLICATION, WITH LONG-TERM CURRENT USE OF INSULIN (HCC): ICD-10-CM

## 2017-12-21 DIAGNOSIS — I42.8 OTHER CARDIOMYOPATHY (HCC): ICD-10-CM

## 2017-12-21 DIAGNOSIS — G47.10 HYPERSOMNIA: ICD-10-CM

## 2017-12-21 PROCEDURE — 99214 OFFICE O/P EST MOD 30 MIN: CPT | Performed by: FAMILY MEDICINE

## 2017-12-21 NOTE — PROGRESS NOTES
Subjective   Andreas Rubin is a 67 y.o. male.     Chief Complaint   Patient presents with   • Diabetes   • Hypertension   • Gait Problem   • Hypersomnia         History of Present Illness   Patient has history of hypertension and cardiomyopathy.  His blood pressures well controlled maybe a little low at times.  He has cerebellar ataxia based on distant history of right cerebellar stroke.  Hypersomnia is an issue 2 throughout the day without obvious etiology.  He is seen endocrinology and his labs are reviewed with not perfect glucose control.      The following portions of the patient's history were reviewed and updated as appropriate: allergies, current medications, past social history and problem list.    Review of Systems   Constitutional: Negative.    HENT: Negative.    Eyes: Negative.    Respiratory: Negative.    Cardiovascular: Negative.    Gastrointestinal: Negative.    Endocrine: Negative.    Genitourinary: Negative.    Musculoskeletal: Negative.    Skin: Negative.    Allergic/Immunologic: Negative.    Neurological: Negative.    Hematological: Negative.    Psychiatric/Behavioral: Negative.        Objective   Vitals:    12/21/17 1016   BP: 102/64   Pulse: 83   Temp: 96.6 °F (35.9 °C)   SpO2: 97%     Physical Exam   Constitutional: He is oriented to person, place, and time. He appears well-developed and well-nourished.   HENT:   Head: Normocephalic and atraumatic.   Right Ear: Tympanic membrane and external ear normal.   Left Ear: Tympanic membrane and external ear normal.   Nose: Nose normal.   Mouth/Throat: Oropharynx is clear and moist.   Eyes: Conjunctivae and EOM are normal. Pupils are equal, round, and reactive to light.   Neck: Normal range of motion. Neck supple. No JVD present. No thyromegaly present.   Cardiovascular: Normal rate, regular rhythm and intact distal pulses.  Exam reveals gallop and S3.    Pulmonary/Chest: Effort normal and breath sounds normal.   Abdominal: Soft. Bowel sounds are  normal.   Musculoskeletal: Normal range of motion.   Lymphadenopathy:     He has no cervical adenopathy.   Neurological: He is alert and oriented to person, place, and time. No cranial nerve deficit. Coordination and gait abnormal.   Skin: Skin is warm and dry. No rash noted.   Psychiatric: He has a normal mood and affect. His behavior is normal. Judgment and thought content normal.   Vitals reviewed.      Assessment/Plan   Problem List Items Addressed This Visit        Cardiovascular and Mediastinum    Cardiomyopathy    Essential hypertension       Endocrine    Type 2 diabetes mellitus with complication, with long-term current use of insulin       Nervous and Auditory    Cerebellar ataxia - Primary    Relevant Orders    Ambulatory Referral to Physical Therapy Evaluate and treat      Other Visit Diagnoses     Hypersomnia          Follow-up appointment with physical therapy Eron yoselin Doyle at the first of the year for repeat physical therapy.  Return visit in about 3 months.

## 2017-12-24 DIAGNOSIS — I10 ESSENTIAL HYPERTENSION: ICD-10-CM

## 2017-12-26 RX ORDER — FUROSEMIDE 40 MG/1
TABLET ORAL
Qty: 30 TABLET | Refills: 5 | Status: SHIPPED | OUTPATIENT
Start: 2017-12-26 | End: 2018-05-03 | Stop reason: SDUPTHER

## 2018-02-13 ENCOUNTER — CLINICAL SUPPORT NO REQUIREMENTS (OUTPATIENT)
Dept: CARDIOLOGY | Facility: CLINIC | Age: 68
End: 2018-02-13

## 2018-02-13 DIAGNOSIS — I42.9 CARDIOMYOPATHY, UNSPECIFIED TYPE (HCC): Primary | ICD-10-CM

## 2018-02-13 PROCEDURE — 93294 REM INTERROG EVL PM/LDLS PM: CPT | Performed by: INTERNAL MEDICINE

## 2018-02-13 PROCEDURE — 93296 REM INTERROG EVL PM/IDS: CPT | Performed by: INTERNAL MEDICINE

## 2018-02-13 PROCEDURE — 93297 REM INTERROG DEV EVAL ICPMS: CPT | Performed by: INTERNAL MEDICINE

## 2018-03-30 DIAGNOSIS — F32.A DEPRESSION: ICD-10-CM

## 2018-03-30 RX ORDER — CITALOPRAM 20 MG/1
TABLET ORAL
Qty: 30 TABLET | Refills: 5 | Status: SHIPPED | OUTPATIENT
Start: 2018-03-30 | End: 2018-07-16 | Stop reason: SDUPTHER

## 2018-04-23 ENCOUNTER — CLINICAL SUPPORT NO REQUIREMENTS (OUTPATIENT)
Dept: CARDIOLOGY | Facility: CLINIC | Age: 68
End: 2018-04-23

## 2018-04-23 ENCOUNTER — OFFICE VISIT (OUTPATIENT)
Dept: CARDIOLOGY | Facility: CLINIC | Age: 68
End: 2018-04-23

## 2018-04-23 VITALS
HEART RATE: 73 BPM | WEIGHT: 209 LBS | DIASTOLIC BLOOD PRESSURE: 70 MMHG | BODY MASS INDEX: 28.31 KG/M2 | SYSTOLIC BLOOD PRESSURE: 120 MMHG | HEIGHT: 72 IN

## 2018-04-23 DIAGNOSIS — Z95.0 S/P BIVENTRICULAR CARDIAC PACEMAKER PROCEDURE: Primary | ICD-10-CM

## 2018-04-23 DIAGNOSIS — I25.5 ISCHEMIC CARDIOMYOPATHY: Primary | ICD-10-CM

## 2018-04-23 DIAGNOSIS — I42.0 DILATED CARDIOMYOPATHY (HCC): ICD-10-CM

## 2018-04-23 DIAGNOSIS — I48.0 PAROXYSMAL ATRIAL FIBRILLATION (HCC): ICD-10-CM

## 2018-04-23 PROCEDURE — 93290 INTERROG DEV EVAL ICPMS IP: CPT | Performed by: INTERNAL MEDICINE

## 2018-04-23 PROCEDURE — 93000 ELECTROCARDIOGRAM COMPLETE: CPT | Performed by: INTERNAL MEDICINE

## 2018-04-23 PROCEDURE — 93281 PM DEVICE PROGR EVAL MULTI: CPT | Performed by: INTERNAL MEDICINE

## 2018-04-23 PROCEDURE — 99213 OFFICE O/P EST LOW 20 MIN: CPT | Performed by: INTERNAL MEDICINE

## 2018-04-23 NOTE — PROGRESS NOTES
Date of Office Visit: 2018  Encounter Provider: Regino James MD  Place of Service: [facility] CARDIOLOGY  Patient Name: Andreas Rubin  : 1950    Subjective:     Encounter Date:2018      Patient ID: Andreas Rubin is a 67 y.o. male who has a cc of NICM and prev RCA stent and CRTP   EF about 30%. He is doing fine. No recent hosp admissions.   I stopped his warfarin because of falls and lack of AF on the CRT--p    No anginal chest pain,   No sig mcclellan,   No soa,   No fainting,  No orthostasis.   No edema.   Exercise tolerance: walks a couple of blocks.     There have been no hospital admission since the last visit.         Past Medical History:   Diagnosis Date   • Allergic rhinitis    • ASHD (arteriosclerotic heart disease)    • Atrial fibrillation    • AV block    • Cardiomyopathy    • Cerebellar stroke    • CHF (congestive heart failure)    • Depression    • DM type 2 (diabetes mellitus, type 2)    • Encounter for special screening examination for neoplasm of prostate    • Hyperlipidemia    • Hypertension    • Hypotension    • Imbalance    • Intermittent claudication    • PALMER (obstructive sleep apnea)        Social History     Social History   • Marital status: Single     Spouse name: N/A   • Number of children: N/A   • Years of education: N/A     Occupational History   • Not on file.     Social History Main Topics   • Smoking status: Never Smoker   • Smokeless tobacco: Never Used      Comment: caffeine use   • Alcohol use Yes      Comment: ocasional   • Drug use: No   • Sexual activity: Not on file     Other Topics Concern   • Not on file     Social History Narrative   • No narrative on file       Review of Systems   Constitution: Negative for fever and night sweats.   HENT: Negative for ear pain and stridor.    Eyes: Negative for discharge and visual halos.   Cardiovascular: Negative for cyanosis.   Respiratory: Negative for hemoptysis and sputum  "production.    Hematologic/Lymphatic: Negative for adenopathy.   Skin: Negative for nail changes and unusual hair distribution.   Musculoskeletal: Negative for gout and joint swelling.   Gastrointestinal: Negative for bowel incontinence and flatus.   Genitourinary: Negative for dysuria and flank pain.   Neurological: Negative for seizures and tremors.   Psychiatric/Behavioral: Negative for altered mental status. The patient is not nervous/anxious.             Objective:     Vitals:    04/23/18 0854   BP: 120/70   BP Location: Right arm   Pulse: 73   Weight: 94.8 kg (209 lb)   Height: 182.9 cm (72\")         Physical Exam   Constitutional: He is oriented to person, place, and time.   HENT:   Head: Normocephalic and atraumatic.   Eyes: Right eye exhibits no discharge. Left eye exhibits no discharge.   Neck: No JVD present. No thyromegaly present.   Cardiovascular: Normal rate and regular rhythm.  Exam reveals no gallop and no friction rub.    No murmur heard.  Pulmonary/Chest: Effort normal and breath sounds normal. He has no rales.   Abdominal: Soft. Bowel sounds are normal. There is no tenderness.   Musculoskeletal: Normal range of motion. He exhibits no edema or deformity.   Neurological: He is alert and oriented to person, place, and time. He exhibits normal muscle tone.   Skin: Skin is warm and dry. No erythema.   Psychiatric: He has a normal mood and affect. His behavior is normal. Thought content normal.         ECG 12 Lead  Date/Time: 4/23/2018 9:41 AM  Performed by: TAMIA BASURTO  Authorized by: TAMIA BASURTO   Comparison: compared with previous ECG   Rhythm: sinus rhythm and paced  Clinical impression: abnormal ECG            Lab Review:       Assessment:          Diagnosis Plan   1. S/P biventricular cardiac pacemaker procedure     2. Dilated cardiomyopathy            Plan:         NICM wise he is doing well -- on bb but not ARB or ACE due to low bp  AF -- he isnt having any     Exam - no heart failure. "   Pacer ok -- good CRT                 I spent 20 minutes or more w pt and chart.

## 2018-05-03 ENCOUNTER — OFFICE VISIT (OUTPATIENT)
Dept: ENDOCRINOLOGY | Age: 68
End: 2018-05-03

## 2018-05-03 VITALS
OXYGEN SATURATION: 97 % | SYSTOLIC BLOOD PRESSURE: 102 MMHG | HEIGHT: 72 IN | HEART RATE: 69 BPM | BODY MASS INDEX: 28.04 KG/M2 | WEIGHT: 207 LBS | DIASTOLIC BLOOD PRESSURE: 58 MMHG

## 2018-05-03 DIAGNOSIS — E11.42 TYPE 2 DIABETES MELLITUS WITH PERIPHERAL NEUROPATHY (HCC): ICD-10-CM

## 2018-05-03 DIAGNOSIS — I25.10 CORONARY ARTERY DISEASE INVOLVING NATIVE CORONARY ARTERY OF NATIVE HEART WITHOUT ANGINA PECTORIS: ICD-10-CM

## 2018-05-03 DIAGNOSIS — E11.3293 TYPE 2 DIABETES MELLITUS WITH MILD NONPROLIFERATIVE RETINOPATHY OF BOTH EYES, WITH LONG-TERM CURRENT USE OF INSULIN, MACULAR EDEMA PRESENCE UNSPECIFIED (HCC): ICD-10-CM

## 2018-05-03 DIAGNOSIS — Z79.4 TYPE 2 DIABETES MELLITUS WITH MILD NONPROLIFERATIVE RETINOPATHY OF BOTH EYES, WITH LONG-TERM CURRENT USE OF INSULIN, MACULAR EDEMA PRESENCE UNSPECIFIED (HCC): ICD-10-CM

## 2018-05-03 DIAGNOSIS — E11.8 TYPE 2 DIABETES MELLITUS WITH COMPLICATION, WITH LONG-TERM CURRENT USE OF INSULIN (HCC): Primary | ICD-10-CM

## 2018-05-03 DIAGNOSIS — I10 ESSENTIAL HYPERTENSION: ICD-10-CM

## 2018-05-03 DIAGNOSIS — Z79.4 TYPE 2 DIABETES MELLITUS WITH COMPLICATION, WITH LONG-TERM CURRENT USE OF INSULIN (HCC): Primary | ICD-10-CM

## 2018-05-03 DIAGNOSIS — E78.5 HYPERLIPIDEMIA, UNSPECIFIED HYPERLIPIDEMIA TYPE: ICD-10-CM

## 2018-05-03 LAB
ALBUMIN SERPL-MCNC: 4.4 G/DL (ref 3.5–5.2)
ALBUMIN/GLOB SERPL: 1.5 G/DL
ALP SERPL-CCNC: 82 U/L (ref 39–117)
ALT SERPL-CCNC: 15 U/L (ref 1–41)
AST SERPL-CCNC: 18 U/L (ref 1–40)
BILIRUB SERPL-MCNC: 1.1 MG/DL (ref 0.1–1.2)
BUN SERPL-MCNC: 14 MG/DL (ref 8–23)
BUN/CREAT SERPL: 17.9 (ref 7–25)
CALCIUM SERPL-MCNC: 9.5 MG/DL (ref 8.6–10.5)
CHLORIDE SERPL-SCNC: 102 MMOL/L (ref 98–107)
CHOLEST SERPL-MCNC: 144 MG/DL (ref 0–200)
CO2 SERPL-SCNC: 29.3 MMOL/L (ref 22–29)
CREAT SERPL-MCNC: 0.78 MG/DL (ref 0.76–1.27)
GFR SERPLBLD CREATININE-BSD FMLA CKD-EPI: 100 ML/MIN/1.73
GFR SERPLBLD CREATININE-BSD FMLA CKD-EPI: 121 ML/MIN/1.73
GLOBULIN SER CALC-MCNC: 2.9 GM/DL
GLUCOSE SERPL-MCNC: 119 MG/DL (ref 65–99)
HBA1C MFR BLD: 7.12 % (ref 4.8–5.6)
HDLC SERPL-MCNC: 49 MG/DL (ref 40–60)
INTERPRETATION: NORMAL
LDLC SERPL CALC-MCNC: 76 MG/DL (ref 0–100)
Lab: NORMAL
POTASSIUM SERPL-SCNC: 5.1 MMOL/L (ref 3.5–5.2)
PROT SERPL-MCNC: 7.3 G/DL (ref 6–8.5)
SODIUM SERPL-SCNC: 143 MMOL/L (ref 136–145)
T4 FREE SERPL-MCNC: 1.13 NG/DL (ref 0.93–1.7)
TRIGL SERPL-MCNC: 93 MG/DL (ref 0–150)
TSH SERPL DL<=0.005 MIU/L-ACNC: 2.53 MIU/ML (ref 0.27–4.2)
VLDLC SERPL CALC-MCNC: 18.6 MG/DL (ref 5–40)

## 2018-05-03 PROCEDURE — 99214 OFFICE O/P EST MOD 30 MIN: CPT | Performed by: INTERNAL MEDICINE

## 2018-05-03 NOTE — PROGRESS NOTES
Subjective   Andreas Rubin is a 67 y.o. male.     F/u for dm 2,hyperlipidemia, hypertension,CAD,sleep apnea / testing bs1 x day / last dm eye exam 5/31/17 with dr Wade / last dm foot exam today with dr Carpenter       Diabetes   Hypoglycemia symptoms include confusion, dizziness and nervousness/anxiousness.   Hyperlipidemia   Associated symptoms include shortness of breath.   Hypertension   Associated symptoms include shortness of breath. Identifiable causes of hypertension include sleep apnea.   Coronary Artery Disease   Symptoms include dizziness and shortness of breath. Risk factors include hyperlipidemia.   Sleep Apnea   Associated symptoms include joint swelling and numbness (feet ).      Patient is a 67-year-old male who came in for followup. He has type 2 diabetes mellitus since 2001. He started on insulin in 2008. C-peptide levels were detectable and KRISTAL antibody was negative. He has been on Levemir 30 units twice a day and Humalog 15 units with meals and metformin 500 mg twice a day. Fasting blood sugar runs between 105-190. He has denies hypoglycemic episodes.  He has lost 16 pounds since 12/17 with reduced food intake.  His last meal was last night.      His last eye examination was in 5/17 and there was mild nonproliferative diabetic retinopathy. He has microalbuminuria on urine sample taken last 12/17. He has intermittent numbness on both feet but no pain.      He has hyperlipidemia and was taken off Lipitor because he was having leg cramps. He has been on pravastatin 40 mg + 20  mg every evening instead of 80 mg daily. He has nocturnal leg cramps which is improved with Requip.        He has hypertension and has been on Coreg, Lasix, and spironolactone.        He has known coronary artery disease and had previous angioplasty with stent. He was admitted last June 2016 for congestive heart failure. He denies any chest pain. He has SOB with exertion but no orthopnea or PND. He has a history of atrial  "fibrillation and was taken off warfarin because of recurrent falls.  He denies any palpitations. He is on ASA 81 mg/day.  He saw Dr. James in April 23, 2018.     He was seen by Dr. Irene for gait instability.  CT of the brain done in November 2017 showed a remote infarct in the right cerebellum and mild small vessel ischemic change.  He is using a walker at home.        He has sleep apnea and is unable to tolerate CPAP. He has been off nocturnal oxygen because he has not followed up with Dr. Barnes.     The following portions of the patient's history were reviewed and updated as appropriate: allergies, current medications, past family history, past medical history, past social history, past surgical history and problem list.    Review of Systems   Constitutional: Negative.    HENT: Negative.    Eyes: Negative.    Respiratory: Positive for shortness of breath.    Cardiovascular: Negative.    Gastrointestinal: Negative.    Endocrine: Negative.    Genitourinary: Negative.    Musculoskeletal: Positive for back pain and joint swelling.   Skin: Negative.    Allergic/Immunologic: Negative.    Neurological: Positive for dizziness, light-headedness and numbness (feet ).   Hematological: Negative.    Psychiatric/Behavioral: Positive for agitation, confusion and sleep disturbance ( sleep apnea unable to tolerate CPAP machine ). The patient is nervous/anxious.        Objective      Vitals:    05/03/18 0907   BP: 102/58   BP Location: Right arm   Patient Position: Sitting   Cuff Size: Large Adult   Pulse: 69   SpO2: 97%   Weight: 93.9 kg (207 lb)   Height: 182.9 cm (72.01\")     Physical Exam   Constitutional: He is oriented to person, place, and time. He appears well-developed and well-nourished. No distress.   HENT:   Head: Normocephalic.   Nose: Nose normal.   Mouth/Throat: No oropharyngeal exudate.   Eyes: Conjunctivae and EOM are normal. Right eye exhibits no discharge. Left eye exhibits no discharge. No scleral icterus. "   Neck: Normal range of motion. Neck supple. No JVD present. No tracheal deviation present. No thyromegaly present.   Cardiovascular: Normal rate, normal heart sounds and intact distal pulses.  Exam reveals no friction rub.    No murmur heard.  Irregular rhythm   Pulmonary/Chest: Effort normal and breath sounds normal. No respiratory distress. He has no wheezes. He has no rales.   Abdominal: Soft. Bowel sounds are normal. He exhibits no distension and no mass. There is no tenderness. There is no rebound and no guarding.   Musculoskeletal: Normal range of motion. He exhibits no edema, tenderness or deformity.   Lymphadenopathy:     He has no cervical adenopathy.   Neurological: He is alert and oriented to person, place, and time. He has normal reflexes.   Intact light touch   Skin: Skin is warm and dry. No rash noted. No erythema.   Psychiatric: He has a normal mood and affect. His behavior is normal.     Office Visit on 12/14/2017   Component Date Value Ref Range Status   • Glucose 12/15/2017 257* 65 - 99 mg/dL Final   • BUN 12/15/2017 17  8 - 23 mg/dL Final   • Creatinine 12/15/2017 0.87  0.76 - 1.27 mg/dL Final   • eGFR Non  Am 12/15/2017 88  >60 mL/min/1.73 Final   • eGFR African Am 12/15/2017 106  >60 mL/min/1.73 Final   • BUN/Creatinine Ratio 12/15/2017 19.5  7.0 - 25.0 Final   • Sodium 12/15/2017 138  136 - 145 mmol/L Final   • Potassium 12/15/2017 4.7  3.5 - 5.2 mmol/L Final   • Chloride 12/15/2017 96* 98 - 107 mmol/L Final   • Total CO2 12/15/2017 31.4* 22.0 - 29.0 mmol/L Final   • Calcium 12/15/2017 9.6  8.6 - 10.5 mg/dL Final   • Total Protein 12/15/2017 7.3  6.0 - 8.5 g/dL Final   • Albumin 12/15/2017 4.30  3.50 - 5.20 g/dL Final   • Globulin 12/15/2017 3.0  gm/dL Final   • A/G Ratio 12/15/2017 1.4  g/dL Final   • Total Bilirubin 12/15/2017 0.8  0.1 - 1.2 mg/dL Final   • Alkaline Phosphatase 12/15/2017 86  39 - 117 U/L Final   • AST (SGOT) 12/15/2017 20  1 - 40 U/L Final   • ALT (SGPT) 12/15/2017  21  1 - 41 U/L Final   • Total Cholesterol 12/15/2017 239* 0 - 200 mg/dL Final   • Triglycerides 12/15/2017 256* 0 - 150 mg/dL Final   • HDL Cholesterol 12/15/2017 43  40 - 60 mg/dL Final   • VLDL Cholesterol 12/15/2017 51.2* 5 - 40 mg/dL Final   • LDL Cholesterol  12/15/2017 145* 0 - 100 mg/dL Final   • Hemoglobin A1C 12/15/2017 7.80* 4.80 - 5.60 % Final    Comment: Hemoglobin A1C Ranges:  Increased Risk for Diabetes  5.7% to 6.4%  Diabetes                     >= 6.5%  Diabetic Goal                < 7.0%     • TSH 12/15/2017 2.160  0.270 - 4.200 mIU/mL Final   • Free T4 12/15/2017 1.04  0.93 - 1.70 ng/dL Final   • Creatinine, Urine 12/15/2017 105.4  Not Estab. mg/dL Final   • Microalbumin, Urine 12/15/2017 6.5  Not Estab. ug/mL Final   • Microalbumin/Creatinine Ratio 12/15/2017 6.2  0.0 - 30.0 mg/g creat Final   • Interpretation 12/15/2017 Note   Final   • PDF Image 12/15/2017 Not applicable   Final     Assessment/Plan   Andreas was seen today for diabetes, hyperlipidemia, hypertension, coronary artery disease and sleep apnea.    Diagnoses and all orders for this visit:    Type 2 diabetes mellitus with complication, with long-term current use of insulin  -     Comprehensive Metabolic Panel  -     Hemoglobin A1c  -     TSH  -     T4, Free    Type 2 diabetes mellitus with mild nonproliferative retinopathy of both eyes, with long-term current use of insulin, macular edema presence unspecified  -     Comprehensive Metabolic Panel  -     Hemoglobin A1c  -     TSH  -     T4, Free    Hyperlipidemia, unspecified hyperlipidemia type  -     Comprehensive Metabolic Panel  -     Lipid Panel  -     TSH  -     T4, Free    Coronary artery disease involving native coronary artery of native heart without angina pectoris  -     Comprehensive Metabolic Panel    Essential hypertension  -     Comprehensive Metabolic Panel    Type 2 diabetes mellitus with peripheral neuropathy      Continue Levemir, Humalog and metformin.  Check  hemoglobin A1c.  Follow-up with ophthalmologist as scheduled.  Continue pravastatin 40 mg +20 mg daily.  Check lipid profile and adjust dose if needed.  Continue Coreg, Lasix, and spironolactone.  Follow-up with Dr. Aadn Garcia    Send copy of my note to Dr. Dsouza, Dr. Barnes, Dr. Irene and Dr. Jacob JOYNER 4 mos.

## 2018-05-30 ENCOUNTER — TELEPHONE (OUTPATIENT)
Dept: INTERNAL MEDICINE | Facility: CLINIC | Age: 68
End: 2018-05-30

## 2018-05-30 DIAGNOSIS — M70.22 OLECRANON BURSITIS, LEFT ELBOW: Primary | ICD-10-CM

## 2018-05-30 NOTE — TELEPHONE ENCOUNTER
He has bursitis of the elbow/olecranon bursitis.  This may need to be drained.  I would like to send him to orthopedics first available perhaps Dr. Paez.

## 2018-05-30 NOTE — TELEPHONE ENCOUNTER
Pt's Brother called, he has a fluid filled area on the outside elbow (when they saw Dr Arias last week he said it was his Bursa and told them he needed to see an Ortho) and this week he's developed a large bruise on the inside of the same elbow  Any suggestions?  Do you need to see him?

## 2018-06-13 RX ORDER — PRAVASTATIN SODIUM 80 MG/1
80 TABLET ORAL NIGHTLY
Qty: 90 TABLET | Refills: 1 | Status: SHIPPED | OUTPATIENT
Start: 2018-06-13 | End: 2018-10-02

## 2018-06-13 RX ORDER — FUROSEMIDE 20 MG/1
TABLET ORAL
Qty: 135 TABLET | Refills: 1 | OUTPATIENT
Start: 2018-06-13

## 2018-06-14 DIAGNOSIS — I10 ESSENTIAL HYPERTENSION: ICD-10-CM

## 2018-06-14 RX ORDER — FUROSEMIDE 40 MG/1
60 TABLET ORAL DAILY
Qty: 45 TABLET | Refills: 5 | Status: SHIPPED | OUTPATIENT
Start: 2018-06-14 | End: 2019-02-19 | Stop reason: SDUPTHER

## 2018-06-25 ENCOUNTER — OFFICE VISIT (OUTPATIENT)
Dept: ORTHOPEDIC SURGERY | Facility: CLINIC | Age: 68
End: 2018-06-25

## 2018-06-25 VITALS — BODY MASS INDEX: 28.17 KG/M2 | TEMPERATURE: 98 F | WEIGHT: 208 LBS | HEIGHT: 72 IN

## 2018-06-25 DIAGNOSIS — M70.22 OLECRANON BURSITIS OF LEFT ELBOW: ICD-10-CM

## 2018-06-25 DIAGNOSIS — M25.522 LEFT ELBOW PAIN: Primary | ICD-10-CM

## 2018-06-25 PROCEDURE — 73070 X-RAY EXAM OF ELBOW: CPT | Performed by: ORTHOPAEDIC SURGERY

## 2018-06-25 PROCEDURE — 99203 OFFICE O/P NEW LOW 30 MIN: CPT | Performed by: ORTHOPAEDIC SURGERY

## 2018-06-25 NOTE — PROGRESS NOTES
New Left Elbow      Patient: Andreas Rubin        YOB: 1950        Chief Complaints: Left Elbow pain  Chief Complaint   Patient presents with   • Left Elbow - Establish Care, Pain           History of Present Illness:  This is a  67 y.o. male who presents complaining of some swelling about the left elbow is been ongoing for weeks it was worse she had a lot of bruising associated with the medial elbow and posteriorly with no real injury that he can recall he states the majority has resolved he still has some local swelling left no redness symptoms are currently mild intermittent he does have redness bruising swelling he is retired past medical history marked for sleep apnea and diabetes COPD he does not smoke  Chief Complaint   Patient presents with   • Left Elbow - Establish Care, Pain             Allergies:   Allergies   Allergen Reactions   • Codeine Diarrhea     diarrhea   • Codeine Sulfate        Medications:   Home Medications:  Current Outpatient Prescriptions on File Prior to Visit   Medication Sig   • aspirin 81 MG tablet Take 1 tablet by mouth Daily.   • JERRY MICROLET LANCETS lancets daily.   • carvedilol (COREG) 6.25 MG tablet Take 1 tablet by mouth 2 (Two) Times a Day. (Patient taking differently: Take 12.5 mg by mouth Daily. 1/2 tablet daily)   • citalopram (CeleXA) 20 MG tablet TAKE 1 TABLET BY MOUTH EVERYDAY. (Patient taking differently: TAKE 1 TABLET BY MOUTH prn)   • citalopram (CeleXA) 20 MG tablet TAKE 1 TABLET BY MOUTH EVERYDAY.   • furosemide (LASIX) 40 MG tablet Take 1.5 tablets by mouth Daily.   • Glucose Blood (JERRY CONTOUR NEXT TEST VI) 4 (four) times a day.   • glucose blood (ONE TOUCH ULTRA TEST) test strip TESTING BS 1 X DAY DX CODE E11.8   • HUMALOG KWIKPEN 100 UNIT/ML solution pen-injector INJECT 15 UNITS THREE TIMES A DAY WITH MEAL   • KLOR-CON 10 MEQ CR tablet TAKE 1 TABLET BY MOUTH EVERY DAY   • LEVEMIR FLEXTOUCH 100 UNIT/ML injection INJECT 30 UNITS TWICE A DAY    • metFORMIN (GLUCOPHAGE) 500 MG tablet Take 1 tablet by mouth 2 (Two) Times a Day With Meals. (Patient taking differently: Take 500 mg by mouth Daily With Breakfast.)   • Multiple Vitamins-Minerals (MULTI FOR HIM) capsule Take 1 tablet/day by mouth daily.   • nitroglycerin (NITROSTAT) 0.4 MG SL tablet Place under the tongue.   • pravastatin (PRAVACHOL) 80 MG tablet TAKE 1 TABLET BY MOUTH EVERY NIGHT.   • rOPINIRole (REQUIP) 1 MG tablet Take 1 tablet by mouth.   • spironolactone (ALDACTONE) 25 MG tablet TAKE 1 TABLET DAILY.   • vitamin E ([employer] VITAMIN E) 400 UNIT capsule Take  by mouth.     No current facility-administered medications on file prior to visit.      Current Medications:  Scheduled Meds:  Continuous Infusions:  No current facility-administered medications for this visit.   PRN Meds:.    Past Medical History:   Diagnosis Date   • Allergic rhinitis    • ASHD (arteriosclerotic heart disease)    • Atrial fibrillation    • AV block    • Cardiomyopathy    • Cerebellar stroke    • CHF (congestive heart failure)    • Depression    • DM type 2 (diabetes mellitus, type 2)    • Encounter for special screening examination for neoplasm of prostate    • Hyperlipidemia    • Hypertension    • Hypotension    • Imbalance    • Intermittent claudication    • PALMER (obstructive sleep apnea)         Past Surgical History:   Procedure Laterality Date   • BUNIONECTOMY      BILATERAL FEET   • CAROTID STENT      EF=10-15%LEFT MAIN NORMAL , MILD LUMINAL IRR OF LED AND 20% DISEASE OF THE CIRCUMFLEX 80% LESIONIN THE PROX RCA THAT WAS ACTUALLY NONDOMINANT 2.5 X 18 MM VISION BARE METAL STENT IN THE MID RCA    • CATARACT EXTRACTION Bilateral    • COLONOSCOPY N/A 3/3/2017    Procedure: COLONOSCOPY TO CECUM;  Surgeon: Benton Castellanos MD;  Location: [facility] ENDOSCOPY;  Service:    • CORONARY ANGIOPLASTY WITH STENT PLACEMENT      EF = 10-15%.  Left main was normal, mild luminal irregularities of LAD, and 20% disease of the circumflex.  80%  "lesion in the prox RCA that was actually nondominant.  2.5 x 18mm Vision bare metal stent in the mid RCA.   • ELBOW PROCEDURE      REMOVAL OF NODULE ON LEFT ELBOW   • KNEE ACL RECONSTRUCTION      LEFT KNEE   • PACEMAKER IMPLANTATION     • PACEMAKER REPLACEMENT          Social History     Occupational History   • Not on file.     Social History Main Topics   • Smoking status: Never Smoker   • Smokeless tobacco: Never Used      Comment: caffeine use   • Alcohol use Yes      Comment: ocasional   • Drug use: No   • Sexual activity: Not on file    Social History     Social History Narrative   • No narrative on file        Family History   Problem Relation Age of Onset   • Lung cancer Mother    • Stroke Father    • Heart attack Father    • Lung cancer Father    • Diabetes Brother    • Hyperlipidemia Brother    • Diabetes Brother    • Throat cancer Brother              Review of Systems: 14 point review of systems are remarkable for the pertinent positives listed in the chart by the patient the remainder are negative  Review of Systems      Physical Exam: 67 y.o. male  General Appearance:    Alert, cooperative, in no acute distress                 Vitals:    06/25/18 1548   Temp: 98 °F (36.7 °C)   TempSrc: Temporal Artery    Weight: 94.3 kg (208 lb)   Height: 182.9 cm (72\")      Patient is alert and read ×3 no acute distress appears her above-listed at height weight and age.  Affect is normal respiratory rate is normal unlabored. Heart rate regular rate rhythm, sclera, dentition and hearing are normal for the purpose of this exam.        Ortho Exam       Physical exam his left elbow he has some residual ecchymosis medially some mild tenderness in that area is a little bit of fluid left in the olecranon process area no redness full range of motion elbow no palpable tenderness normal shoulder wrist exam good distal pulses    Radiology:   AP, Lateral of the  left elbow were ordered/reviewed to evaluate pain.  I've no " comparative films these are normalcy no evidence of any acute bony pathology does have a tiny olecranon spur      Assessment/Plan:    Olecranon bursitis that has resolved for the most part is a little bit of fluid left but nothing that I think would warrant aspiration I encouraged him to keep all weight off this if this fluid/swelling returned she is going to get back in here we will consider aspiration

## 2018-07-16 ENCOUNTER — OFFICE VISIT (OUTPATIENT)
Dept: INTERNAL MEDICINE | Facility: CLINIC | Age: 68
End: 2018-07-16

## 2018-07-16 VITALS
BODY MASS INDEX: 28.48 KG/M2 | TEMPERATURE: 98 F | DIASTOLIC BLOOD PRESSURE: 60 MMHG | OXYGEN SATURATION: 96 % | WEIGHT: 210 LBS | HEART RATE: 88 BPM | SYSTOLIC BLOOD PRESSURE: 96 MMHG

## 2018-07-16 DIAGNOSIS — G11.9 CEREBELLAR ATAXIA (HCC): ICD-10-CM

## 2018-07-16 DIAGNOSIS — E78.2 MIXED HYPERLIPIDEMIA: ICD-10-CM

## 2018-07-16 DIAGNOSIS — E11.42 TYPE 2 DIABETES MELLITUS WITH PERIPHERAL NEUROPATHY (HCC): ICD-10-CM

## 2018-07-16 DIAGNOSIS — I48.0 PAROXYSMAL ATRIAL FIBRILLATION (HCC): Primary | ICD-10-CM

## 2018-07-16 DIAGNOSIS — I25.5 ISCHEMIC CARDIOMYOPATHY: ICD-10-CM

## 2018-07-16 DIAGNOSIS — I10 ESSENTIAL HYPERTENSION: ICD-10-CM

## 2018-07-16 PROCEDURE — G0439 PPPS, SUBSEQ VISIT: HCPCS | Performed by: FAMILY MEDICINE

## 2018-07-16 PROCEDURE — 99214 OFFICE O/P EST MOD 30 MIN: CPT | Performed by: FAMILY MEDICINE

## 2018-07-16 NOTE — PATIENT INSTRUCTIONS
Medicare Wellness  Personal Prevention Plan of Service     Date of Office Visit:  2018  Encounter Provider:  Luis Dsouza Jr., MD  Place of Service:  [facility] INTERNAL MEDICINE  Patient Name: Andreas Rubin  :  1950    As part of the Medicare Wellness portion of your visit today, we are providing you with this personalized preventive plan of services (PPPS). This plan is based upon recommendations of the [place] Preventive Services Task Force (USPSTF) and the Advisory Committee on Immunization Practices (ACIP).    This lists the preventive care services that should be considered, and provides dates of when you are due. Items listed as completed are up-to-date and do not require any further intervention.    Health Maintenance   Topic Date Due   • TDAP/TD VACCINES (1 - Tdap) 2009   • ZOSTER VACCINE (2 of 3) 2012   • HEPATITIS C SCREENING  2016   • MEDICARE ANNUAL WELLNESS  2016   • DIABETIC EYE EXAM  2018 (Originally 3/22/2018)   • INFLUENZA VACCINE  2018   • HEMOGLOBIN A1C  2018   • URINE MICROALBUMIN  2018   • DIABETIC FOOT EXAM  2019   • LIPID PANEL  2019   • COLONOSCOPY  2027   • PNEUMOCOCCAL VACCINES (65+ LOW/MEDIUM RISK)  Completed       No orders of the defined types were placed in this encounter.      No Follow-up on file.

## 2018-07-16 NOTE — PROGRESS NOTES
Subjective   Andreas Rubin is a 67 y.o. male.     Chief Complaint   Patient presents with   • Annual Exam   • Coronary Artery Disease         History of Present Illness   As very pleasant accompanied by his brother.  We're discussing possible assisted living.  He has an intellectual difficulties and evidence of ischemic cardiomyopathy with chronically low blood pressure.  He has been relatively asymptomatic.  Medicare wellness visit is performed today.  He has gone through physical therapy and is falling last.  Unit of hypertension hyperlipidemia type 2 diabetes is reviewed.      The following portions of the patient's history were reviewed and updated as appropriate: allergies, current medications, past social history and problem list.    Review of Systems   Constitutional: Negative.    HENT: Negative.    Eyes: Negative.    Respiratory: Negative.    Cardiovascular: Negative.    Gastrointestinal: Negative.    Endocrine: Negative.    Genitourinary: Negative.    Musculoskeletal: Negative.    Skin: Negative.    Allergic/Immunologic: Negative.    Neurological: Negative.    Hematological: Negative.    Psychiatric/Behavioral: Negative.        Objective   Vitals:    07/16/18 0929   BP: 96/60   Pulse: 88   Temp: 98 °F (36.7 °C)   SpO2: 96%     Physical Exam   Constitutional: He is oriented to person, place, and time. He appears well-developed and well-nourished.   HENT:   Head: Normocephalic.   Right Ear: External ear normal.   Left Ear: External ear normal.   Mouth/Throat: Oropharynx is clear and moist.   Eyes: Pupils are equal, round, and reactive to light.   Neck: Normal range of motion. Neck supple.   Cardiovascular: Normal rate, regular rhythm and normal heart sounds.    Pulmonary/Chest: Effort normal and breath sounds normal.   Abdominal: Soft. Bowel sounds are normal.   Musculoskeletal: Normal range of motion.   Neurological: He is alert and oriented to person, place, and time.   Skin: Skin is warm and dry.    Psychiatric: He has a normal mood and affect.   Vitals reviewed.      Assessment/Plan   Problem List Items Addressed This Visit        Cardiovascular and Mediastinum    Cardiomyopathy (CMS/HCC)    Paroxysmal atrial fibrillation (CMS/HCC) - Primary    Hyperlipidemia    Essential hypertension       Endocrine    Type 2 diabetes mellitus with peripheral neuropathy (CMS/HCC)       Nervous and Auditory    Cerebellar ataxia (CMS/HCC)      Plan: No changes medications.  We'll see him back in about 4 months and consider lab work although he gets regular lab work with endocrinology.

## 2018-07-16 NOTE — PROGRESS NOTES
QUICK REFERENCE INFORMATION:  The ABCs of the Annual Wellness Visit    Subsequent Medicare Wellness Visit    HEALTH RISK ASSESSMENT    1950    Recent Hospitalizations:  No hospitalization(s) within the last year..        Current Medical Providers:  Patient Care Team:  Luis Dsouza Jr., MD as PCP - General (Family Medicine)  Luis Dsouza Jr., MD as PCP - Claims Attributed  Garth Arias MD as Consulting Physician (Vascular Surgery)  Regino James MD as Consulting Physician (Cardiology)  Drew Wade MD as Consulting Physician (Ophthalmology)  Jim Irene DO as Consulting Physician (Neurology)  Adan Barnes MD as Consulting Physician (Pulmonary Disease)  Helio Carpenter MD as Consulting Physician (Endocrinology)        Smoking Status:  History   Smoking Status   • Never Smoker   Smokeless Tobacco   • Never Used     Comment: caffeine use       Alcohol Consumption:  History   Alcohol Use   • Yes     Comment: ocasional       Depression Screen:   PHQ-2/PHQ-9 Depression Screening 7/16/2018   Little interest or pleasure in doing things 0   Feeling down, depressed, or hopeless 0   Trouble falling or staying asleep, or sleeping too much -   Feeling tired or having little energy -   Poor appetite or overeating -   Feeling bad about yourself - or that you are a failure or have let yourself or your family down -   Trouble concentrating on things, such as reading the newspaper or watching television -   Moving or speaking so slowly that other people could have noticed. Or the opposite - being so fidgety or restless that you have been moving around a lot more than usual -   Thoughts that you would be better off dead, or of hurting yourself in some way -   Total Score 0   If you checked off any problems, how difficult have these problems made it for you to do your work, take care of things at home, or get along with other people? -       Health Habits and Functional and Cognitive Screening:  Functional &  Cognitive Status 7/16/2018   Do you have difficulty preparing food and eating? No   Do you have difficulty bathing yourself, getting dressed or grooming yourself? No   Do you have difficulty using the toilet? No   Do you have difficulty moving around from place to place? No   Do you have trouble with steps or getting out of a bed or a chair? Yes   In the past year have you fallen or experienced a near fall? Yes   Current Diet Well Balanced Diet   Dental Exam Up to date   Eye Exam Up to date   Exercise (times per week) 0 times per week   Current Exercise Activities Include None   Do you need help using the phone?  Yes   Are you deaf or do you have serious difficulty hearing?  No   Do you need help with transportation? Yes   Do you need help shopping? Yes   Do you need help preparing meals?  Yes   Do you need help with housework?  Yes   Do you need help with laundry? Yes   Do you need help taking your medications? Yes   Do you need help managing money? Yes   Do you ever drive or ride in a car without wearing a seat belt? No   Have you felt unusual stress, anger or loneliness in the last month? No   Who do you live with? Sibling   If you need help, do you have trouble finding someone available to you? No   Have you been bothered in the last four weeks by sexual problems? No   Do you have difficulty concentrating, remembering or making decisions? Yes           Does the patient have evidence of cognitive impairment? No    Aspirin use counseling: Taking ASA appropriately as indicated      Recent Lab Results:  CMP:  Lab Results   Component Value Date     (H) 05/03/2018    BUN 14 05/03/2018    CREATININE 0.78 05/03/2018    EGFRIFNONA 100 05/03/2018    EGFRIFAFRI 121 05/03/2018    BCR 17.9 05/03/2018     05/03/2018    K 5.1 05/03/2018    CO2 29.3 (H) 05/03/2018    CALCIUM 9.5 05/03/2018    PROTENTOTREF 7.3 05/03/2018    ALBUMIN 4.40 05/03/2018    LABGLOBREF 2.9 05/03/2018    LABIL2 1.5 05/03/2018    BILITOT 1.1  05/03/2018    ALKPHOS 82 05/03/2018    AST 18 05/03/2018    ALT 15 05/03/2018     Lipid Panel:  Lab Results   Component Value Date    TRIG 93 05/03/2018    HDL 49 05/03/2018    VLDL 18.6 05/03/2018     HbA1c:  Lab Results   Component Value Date    HGBA1C 7.12 (H) 05/03/2018       Visual Acuity:  No exam data present    Age-appropriate Screening Schedule:  Refer to the list below for future screening recommendations based on patient's age, sex and/or medical conditions. Orders for these recommended tests are listed in the plan section. The patient has been provided with a written plan.    Health Maintenance   Topic Date Due   • TDAP/TD VACCINES (1 - Tdap) 01/02/2009   • ZOSTER VACCINE (2 of 3) 12/16/2012   • DIABETIC EYE EXAM  08/31/2018 (Originally 3/22/2018)   • INFLUENZA VACCINE  08/01/2018   • HEMOGLOBIN A1C  11/03/2018   • URINE MICROALBUMIN  12/14/2018   • DIABETIC FOOT EXAM  05/03/2019   • LIPID PANEL  05/03/2019   • COLONOSCOPY  03/03/2027   • PNEUMOCOCCAL VACCINES (65+ LOW/MEDIUM RISK)  Completed        Subjective   History of Present Illness    Andreas Rubin is a 67 y.o. male who presents for an Subsequent Wellness Visit.    The following portions of the patient's history were reviewed and updated as appropriate: allergies, current medications, past family history, past medical history, past social history, past surgical history and problem list.    Outpatient Medications Prior to Visit   Medication Sig Dispense Refill   • aspirin 81 MG tablet Take 1 tablet by mouth Daily. 30 tablet 11   • JERRY MICROLET LANCETS lancets daily.     • carvedilol (COREG) 6.25 MG tablet Take 1 tablet by mouth 2 (Two) Times a Day. (Patient taking differently: Take 12.5 mg by mouth Daily. 1/2 tablet daily) 180 tablet 3   • citalopram (CeleXA) 20 MG tablet TAKE 1 TABLET BY MOUTH EVERYDAY. (Patient taking differently: TAKE 1 TABLET BY MOUTH prn) 30 tablet 4   • furosemide (LASIX) 40 MG tablet Take 1.5 tablets by mouth Daily.  45 tablet 5   • Glucose Blood (JERRY CONTOUR NEXT TEST VI) 4 (four) times a day.     • glucose blood (ONE TOUCH ULTRA TEST) test strip TESTING BS 1 X DAY DX CODE E11.8 100 each 1   • HUMALOG KWIKPEN 100 UNIT/ML solution pen-injector INJECT 15 UNITS THREE TIMES A DAY WITH MEAL 46 mL 1   • KLOR-CON 10 MEQ CR tablet TAKE 1 TABLET BY MOUTH EVERY DAY 90 tablet 3   • LEVEMIR FLEXTOUCH 100 UNIT/ML injection INJECT 30 UNITS TWICE A DAY 58 mL 1   • metFORMIN (GLUCOPHAGE) 500 MG tablet Take 1 tablet by mouth 2 (Two) Times a Day With Meals. (Patient taking differently: Take 500 mg by mouth Daily With Breakfast.) 180 tablet 2   • Multiple Vitamins-Minerals (MULTI FOR HIM) capsule Take 1 tablet/day by mouth daily.     • nitroglycerin (NITROSTAT) 0.4 MG SL tablet Place under the tongue.     • pravastatin (PRAVACHOL) 80 MG tablet TAKE 1 TABLET BY MOUTH EVERY NIGHT. 90 tablet 1   • rOPINIRole (REQUIP) 1 MG tablet Take 1 tablet by mouth.     • spironolactone (ALDACTONE) 25 MG tablet TAKE 1 TABLET DAILY. 30 tablet 5   • vitamin E ([employer] VITAMIN E) 400 UNIT capsule Take  by mouth.     • citalopram (CeleXA) 20 MG tablet TAKE 1 TABLET BY MOUTH EVERYDAY. 30 tablet 5     No facility-administered medications prior to visit.        Patient Active Problem List   Diagnosis   • Type 2 diabetes mellitus with complication, with long-term current use of insulin (CMS/[facility])   • Type 2 diabetes mellitus with diabetic retinopathy (CMS/[facility])   • Hyperlipidemia   • Essential hypertension   • Coronary artery disease involving native coronary artery of native heart without angina pectoris   • S/P angioplasty with stent   • Sleep apnea   • Right-sided carotid artery disease (CMS/HCC)   • Hypotension, postural   • Cardiomyopathy (CMS/HCC)   • Paroxysmal atrial fibrillation (CMS/HCC)   • S/P biventricular cardiac pacemaker procedure   • Falls   • Dizziness   • History of cerebellar stroke   • Cerebellar ataxia (CMS/HCC)   • Type 2 diabetes mellitus with  peripheral neuropathy (CMS/[facility])       Advance Care Planning:  has an advance directive - a copy has been provided and is in file    Identification of Risk Factors:  Risk factors include: cardiovascular risk.    Review of Systems    Compared to one year ago, the patient feels his physical health is the same.  Compared to one year ago, the patient feels his mental health is the same.    Objective     Physical Exam    Vitals:    07/16/18 0929   Pulse: 88   Temp: 98 °F (36.7 °C)   TempSrc: Tympanic   SpO2: 96%   Weight: 95.3 kg (210 lb)   PainSc: 0-No pain       Patient's Body mass index is 28.48 kg/m². BMI is within normal parameters. No follow-up required.      Assessment/Plan   Patient Self-Management and Personalized Health Advice  The patient has been provided with information about: prevention of cardiac or vascular disease, fall prevention and mental health concerns and preventive services including:   · Counseling for cardiovascular disease risk reduction.    Visit Diagnoses:  No diagnosis found.    No orders of the defined types were placed in this encounter.      Outpatient Encounter Prescriptions as of 7/16/2018   Medication Sig Dispense Refill   • aspirin 81 MG tablet Take 1 tablet by mouth Daily. 30 tablet 11   • JERRY MICROLET LANCETS lancets daily.     • carvedilol (COREG) 6.25 MG tablet Take 1 tablet by mouth 2 (Two) Times a Day. (Patient taking differently: Take 12.5 mg by mouth Daily. 1/2 tablet daily) 180 tablet 3   • citalopram (CeleXA) 20 MG tablet TAKE 1 TABLET BY MOUTH EVERYDAY. (Patient taking differently: TAKE 1 TABLET BY MOUTH prn) 30 tablet 4   • furosemide (LASIX) 40 MG tablet Take 1.5 tablets by mouth Daily. 45 tablet 5   • Glucose Blood (JERRY CONTOUR NEXT TEST VI) 4 (four) times a day.     • glucose blood (ONE TOUCH ULTRA TEST) test strip TESTING BS 1 X DAY DX CODE E11.8 100 each 1   • HUMALOG KWIKPEN 100 UNIT/ML solution pen-injector INJECT 15 UNITS THREE TIMES A DAY WITH MEAL 46 mL 1   •  KLOR-CON 10 MEQ CR tablet TAKE 1 TABLET BY MOUTH EVERY DAY 90 tablet 3   • LEVEMIR FLEXTOUCH 100 UNIT/ML injection INJECT 30 UNITS TWICE A DAY 58 mL 1   • metFORMIN (GLUCOPHAGE) 500 MG tablet Take 1 tablet by mouth 2 (Two) Times a Day With Meals. (Patient taking differently: Take 500 mg by mouth Daily With Breakfast.) 180 tablet 2   • Multiple Vitamins-Minerals (MULTI FOR HIM) capsule Take 1 tablet/day by mouth daily.     • nitroglycerin (NITROSTAT) 0.4 MG SL tablet Place under the tongue.     • pravastatin (PRAVACHOL) 80 MG tablet TAKE 1 TABLET BY MOUTH EVERY NIGHT. 90 tablet 1   • rOPINIRole (REQUIP) 1 MG tablet Take 1 tablet by mouth.     • spironolactone (ALDACTONE) 25 MG tablet TAKE 1 TABLET DAILY. 30 tablet 5   • vitamin E ([employer] VITAMIN E) 400 UNIT capsule Take  by mouth.     • [DISCONTINUED] citalopram (CeleXA) 20 MG tablet TAKE 1 TABLET BY MOUTH EVERYDAY. 30 tablet 5     No facility-administered encounter medications on file as of 7/16/2018.        Reviewed use of high risk medication in the elderly: yes  Reviewed for potential of harmful drug interactions in the elderly: yes    Follow Up:  No Follow-up on file.     An After Visit Summary and PPPS with all of these plans were given to the patient.

## 2018-07-30 ENCOUNTER — CLINICAL SUPPORT NO REQUIREMENTS (OUTPATIENT)
Dept: CARDIOLOGY | Facility: CLINIC | Age: 68
End: 2018-07-30

## 2018-07-30 DIAGNOSIS — I42.9 CARDIOMYOPATHY, UNSPECIFIED TYPE (HCC): Primary | ICD-10-CM

## 2018-07-30 PROCEDURE — 93297 REM INTERROG DEV EVAL ICPMS: CPT | Performed by: INTERNAL MEDICINE

## 2018-07-30 PROCEDURE — 93294 REM INTERROG EVL PM/LDLS PM: CPT | Performed by: INTERNAL MEDICINE

## 2018-07-30 PROCEDURE — 93296 REM INTERROG EVL PM/IDS: CPT | Performed by: INTERNAL MEDICINE

## 2018-07-31 ENCOUNTER — CLINICAL SUPPORT NO REQUIREMENTS (OUTPATIENT)
Dept: CARDIOLOGY | Facility: CLINIC | Age: 68
End: 2018-07-31

## 2018-07-31 ENCOUNTER — TELEPHONE (OUTPATIENT)
Dept: CARDIOLOGY | Facility: CLINIC | Age: 68
End: 2018-07-31

## 2018-07-31 ENCOUNTER — TELEPHONE (OUTPATIENT)
Dept: INTERNAL MEDICINE | Facility: CLINIC | Age: 68
End: 2018-07-31

## 2018-07-31 DIAGNOSIS — I42.9 CARDIOMYOPATHY, UNSPECIFIED TYPE (HCC): Primary | ICD-10-CM

## 2018-07-31 NOTE — TELEPHONE ENCOUNTER
Brother called - Last night at 11pm, patient was laying on the floor playing with the dog when he felt his heart beating fast. It took him 15 min to get up into a chair which his brother states is unusual. Remote CRT-P check sent to check episodes.  Presenting rhythm is AP/BiVP @ 70bpm. Apace 99.1%, Vpace 99.6%. No episodes reported except about 3 seconds of afib. Spoke to brother to advise him of this. Vtachy detection is set to 150bpm so it is possible there was an episode below detection. Current histograms below. Last remote was yesterday so data is from over the past day.

## 2018-07-31 NOTE — TELEPHONE ENCOUNTER
He needs to call cardiology and needs a holter monitor (cardiology can do it.).   He had an episode of tachycardia but no way to tell how serious.

## 2018-07-31 NOTE — TELEPHONE ENCOUNTER
"Lastnight about 11 pm the pt turned white as a ghost and told his Brother \"my heart is really beating hard\" 158/99 after 30 mins it was down to 120/84 he never had any chest pain     Today it is normal (136/84) and is heart rate is normal also  Should he call the Cardiologist?    "

## 2018-09-25 RX ORDER — INSULIN LISPRO 100 [IU]/ML
INJECTION, SOLUTION INTRAVENOUS; SUBCUTANEOUS
Qty: 45 ML | Refills: 0 | Status: SHIPPED | OUTPATIENT
Start: 2018-09-25 | End: 2018-10-02 | Stop reason: SDUPTHER

## 2018-09-25 RX ORDER — INSULIN DETEMIR 100 [IU]/ML
INJECTION, SOLUTION SUBCUTANEOUS
Qty: 58 ML | Refills: 0 | Status: SHIPPED | OUTPATIENT
Start: 2018-09-25 | End: 2018-10-02 | Stop reason: SDUPTHER

## 2018-09-30 DIAGNOSIS — Z79.4 TYPE 2 DIABETES MELLITUS WITH COMPLICATION, WITH LONG-TERM CURRENT USE OF INSULIN (HCC): ICD-10-CM

## 2018-09-30 DIAGNOSIS — E11.8 TYPE 2 DIABETES MELLITUS WITH COMPLICATION, WITH LONG-TERM CURRENT USE OF INSULIN (HCC): ICD-10-CM

## 2018-10-02 ENCOUNTER — OFFICE VISIT (OUTPATIENT)
Dept: ENDOCRINOLOGY | Age: 68
End: 2018-10-02

## 2018-10-02 VITALS
BODY MASS INDEX: 29.31 KG/M2 | HEART RATE: 74 BPM | WEIGHT: 216.4 LBS | HEIGHT: 72 IN | DIASTOLIC BLOOD PRESSURE: 54 MMHG | SYSTOLIC BLOOD PRESSURE: 116 MMHG | OXYGEN SATURATION: 98 %

## 2018-10-02 DIAGNOSIS — Z95.820 S/P ANGIOPLASTY WITH STENT: ICD-10-CM

## 2018-10-02 DIAGNOSIS — E11.42 TYPE 2 DIABETES MELLITUS WITH PERIPHERAL NEUROPATHY (HCC): ICD-10-CM

## 2018-10-02 DIAGNOSIS — G47.30 SLEEP APNEA, UNSPECIFIED TYPE: ICD-10-CM

## 2018-10-02 DIAGNOSIS — I10 ESSENTIAL HYPERTENSION: ICD-10-CM

## 2018-10-02 DIAGNOSIS — E11.3293 TYPE 2 DIABETES MELLITUS WITH MILD NONPROLIFERATIVE RETINOPATHY OF BOTH EYES, WITH LONG-TERM CURRENT USE OF INSULIN, MACULAR EDEMA PRESENCE UNSPECIFIED (HCC): ICD-10-CM

## 2018-10-02 DIAGNOSIS — Z79.4 TYPE 2 DIABETES MELLITUS WITH MILD NONPROLIFERATIVE RETINOPATHY OF BOTH EYES, WITH LONG-TERM CURRENT USE OF INSULIN, MACULAR EDEMA PRESENCE UNSPECIFIED (HCC): ICD-10-CM

## 2018-10-02 DIAGNOSIS — Z79.4 TYPE 2 DIABETES MELLITUS WITH COMPLICATION, WITH LONG-TERM CURRENT USE OF INSULIN (HCC): ICD-10-CM

## 2018-10-02 DIAGNOSIS — E11.8 TYPE 2 DIABETES MELLITUS WITH COMPLICATION, WITH LONG-TERM CURRENT USE OF INSULIN (HCC): ICD-10-CM

## 2018-10-02 DIAGNOSIS — E11.8 TYPE 2 DIABETES MELLITUS WITH COMPLICATION, WITH LONG-TERM CURRENT USE OF INSULIN (HCC): Primary | ICD-10-CM

## 2018-10-02 DIAGNOSIS — I48.0 PAROXYSMAL ATRIAL FIBRILLATION (HCC): ICD-10-CM

## 2018-10-02 DIAGNOSIS — I25.10 CORONARY ARTERY DISEASE INVOLVING NATIVE CORONARY ARTERY OF NATIVE HEART WITHOUT ANGINA PECTORIS: ICD-10-CM

## 2018-10-02 DIAGNOSIS — E78.5 HYPERLIPIDEMIA, UNSPECIFIED HYPERLIPIDEMIA TYPE: ICD-10-CM

## 2018-10-02 DIAGNOSIS — Z79.4 TYPE 2 DIABETES MELLITUS WITH COMPLICATION, WITH LONG-TERM CURRENT USE OF INSULIN (HCC): Primary | ICD-10-CM

## 2018-10-02 LAB
ALBUMIN SERPL-MCNC: 4.5 G/DL (ref 3.5–5.2)
ALBUMIN/GLOB SERPL: 1.6 G/DL
ALP SERPL-CCNC: 80 U/L (ref 39–117)
ALT SERPL-CCNC: 15 U/L (ref 1–41)
AST SERPL-CCNC: 18 U/L (ref 1–40)
BILIRUB SERPL-MCNC: 1 MG/DL (ref 0.1–1.2)
BUN SERPL-MCNC: 15 MG/DL (ref 8–23)
BUN/CREAT SERPL: 17.9 (ref 7–25)
CALCIUM SERPL-MCNC: 10.1 MG/DL (ref 8.6–10.5)
CHLORIDE SERPL-SCNC: 103 MMOL/L (ref 98–107)
CHOLEST SERPL-MCNC: 115 MG/DL (ref 0–200)
CO2 SERPL-SCNC: 28.4 MMOL/L (ref 22–29)
CREAT SERPL-MCNC: 0.84 MG/DL (ref 0.76–1.27)
GLOBULIN SER CALC-MCNC: 2.8 GM/DL
GLUCOSE SERPL-MCNC: 141 MG/DL (ref 65–99)
HBA1C MFR BLD: 7 % (ref 4.8–5.6)
HDLC SERPL-MCNC: 53 MG/DL (ref 40–60)
INTERPRETATION: NORMAL
LDLC SERPL CALC-MCNC: 43 MG/DL (ref 0–100)
Lab: NORMAL
POTASSIUM SERPL-SCNC: 5.5 MMOL/L (ref 3.5–5.2)
PROT SERPL-MCNC: 7.3 G/DL (ref 6–8.5)
SODIUM SERPL-SCNC: 141 MMOL/L (ref 136–145)
TRIGL SERPL-MCNC: 97 MG/DL (ref 0–150)
TSH SERPL DL<=0.005 MIU/L-ACNC: 1.75 MIU/ML (ref 0.27–4.2)
VLDLC SERPL CALC-MCNC: 19.4 MG/DL (ref 5–40)

## 2018-10-02 PROCEDURE — 99214 OFFICE O/P EST MOD 30 MIN: CPT | Performed by: INTERNAL MEDICINE

## 2018-10-02 RX ORDER — INSULIN LISPRO 100 [IU]/ML
INJECTION, SOLUTION INTRAVENOUS; SUBCUTANEOUS
Qty: 46 ML | Refills: 1 | Status: SHIPPED | OUTPATIENT
Start: 2018-10-02 | End: 2020-01-01

## 2018-10-02 RX ORDER — INSULIN LISPRO 100 [IU]/ML
INJECTION, SOLUTION INTRAVENOUS; SUBCUTANEOUS
Qty: 45 ML | Refills: 0
Start: 2018-10-02 | End: 2018-10-02 | Stop reason: SDUPTHER

## 2018-10-02 RX ORDER — PRAVASTATIN SODIUM 20 MG
60 TABLET ORAL NIGHTLY
COMMUNITY
Start: 2018-10-02 | End: 2019-02-04 | Stop reason: ALTCHOICE

## 2018-10-02 NOTE — PROGRESS NOTES
Subjective   Andreas Rubin is a 67 y.o. male.     F/u for dm 2, hyperlipidemia, hypertension,CAD,sleep apnea / testing bs 2 x day / last dm eye exam 8/11/18 with dr Wade / last dm foot exam 5/3/18 with dr Giraldo       Diabetes   Hypoglycemia symptoms include confusion and nervousness/anxiousness. Associated symptoms include fatigue.   Hyperlipidemia   Associated symptoms include shortness of breath.   Hypertension   Associated symptoms include shortness of breath. Identifiable causes of hypertension include sleep apnea.   Sleep Apnea   Associated symptoms include fatigue and numbness (feet and hands ).   Coronary Artery Disease   Symptoms include shortness of breath. Risk factors include hyperlipidemia.      Patient is a 67-year-old male who came in for followup. He has type 2 diabetes mellitus since 2001. He started on insulin in 2008. C-peptide levels were detectable and KRISTAL antibody was negative. He has been on Levemir 30 units twice a day and Humalog 15 units with meals and metformin 500 mg twice a day. Fasting blood sugar runs between . He denies severe hypoglycemic episodes.  He has lost 9 pounds since 5/18 with reduced food intake.  His last meal was last night.      His last eye examination was in 8/18 and there was mild nonproliferative diabetic retinopathy. He has microalbuminuria on urine sample taken last 12/17. He has intermittent numbness on both feet but no pain.      He has hyperlipidemia and was taken off Lipitor because he was having leg cramps. He has been on pravastatin 40 mg + 20  mg every evening instead of 80 mg daily. He has nocturnal leg cramps which is improved with Requip.        He has hypertension and has been on Coreg, Lasix, and spironolactone.        He has known coronary artery disease and had previous angioplasty with stent. He was admitted last June 2016 for congestive heart failure. He denies any chest pain. He has SOB with exertion but no orthopnea or PND. He has a  "history of atrial fibrillation and was taken off warfarin because of recurrent falls.  He denies any palpitations. He is on ASA 81 mg/day.  He saw Dr. James in April 23, 2018.     He was seen by Dr. Irene for gait instability.  CT of the brain done in November 2017 showed a remote infarct in the right cerebellum and mild small vessel ischemic change.  He is using a walker at home.       He has sleep apnea and is unable to tolerate CPAP. He has been off nocturnal oxygen because he has not followed up with Dr. Barnes.     The following portions of the patient's history were reviewed and updated as appropriate: allergies, current medications, past family history, past medical history, past social history, past surgical history and problem list.    Review of Systems   Constitutional: Positive for fatigue.   HENT: Negative.    Eyes: Negative.    Respiratory: Positive for shortness of breath.    Cardiovascular: Negative.    Gastrointestinal: Negative.    Endocrine: Negative.    Genitourinary: Positive for frequency and urgency.   Musculoskeletal: Positive for back pain.   Skin: Negative.    Allergic/Immunologic: Negative.    Neurological: Positive for numbness (feet and hands ).   Hematological: Bruises/bleeds easily.   Psychiatric/Behavioral: Positive for agitation, confusion and sleep disturbance (sleep apnea unable to tolerate CPAP machine ). The patient is nervous/anxious.        Objective      Vitals:    10/02/18 0912   BP: 116/54   BP Location: Left arm   Patient Position: Sitting   Cuff Size: Large Adult   Pulse: 74   SpO2: 98%   Weight: 98.2 kg (216 lb 6.4 oz)   Height: 182.9 cm (72.01\")     Physical Exam   Constitutional: He is oriented to person, place, and time. He appears well-developed and well-nourished. No distress.   HENT:   Head: Normocephalic.   Nose: Nose normal.   Mouth/Throat: No oropharyngeal exudate.   Eyes: Conjunctivae and EOM are normal. Right eye exhibits no discharge. Left eye exhibits no " discharge. No scleral icterus.   Neck: Neck supple. No JVD present. No tracheal deviation present. No thyromegaly present.   Cardiovascular: Normal rate, regular rhythm, normal heart sounds and intact distal pulses.  Exam reveals no gallop and no friction rub.    No murmur heard.  Pulmonary/Chest: Effort normal and breath sounds normal. No respiratory distress. He has no wheezes. He has no rales. He exhibits no tenderness.   Abdominal: Soft. Bowel sounds are normal. He exhibits no distension and no mass. There is no tenderness. There is no rebound and no guarding.   Musculoskeletal: Normal range of motion. He exhibits no edema, tenderness or deformity.   Lymphadenopathy:     He has no cervical adenopathy.   Neurological: He is alert and oriented to person, place, and time. He displays normal reflexes.   Skin: Skin is warm and dry. No rash noted. He is not diaphoretic. No erythema.   Psychiatric: He has a normal mood and affect. His behavior is normal.     Office Visit on 05/03/2018   Component Date Value Ref Range Status   • Glucose 05/03/2018 119* 65 - 99 mg/dL Final   • BUN 05/03/2018 14  8 - 23 mg/dL Final   • Creatinine 05/03/2018 0.78  0.76 - 1.27 mg/dL Final   • eGFR Non African Am 05/03/2018 100  >60 mL/min/1.73 Final   • eGFR African Am 05/03/2018 121  >60 mL/min/1.73 Final   • BUN/Creatinine Ratio 05/03/2018 17.9  7.0 - 25.0 Final   • Sodium 05/03/2018 143  136 - 145 mmol/L Final   • Potassium 05/03/2018 5.1  3.5 - 5.2 mmol/L Final   • Chloride 05/03/2018 102  98 - 107 mmol/L Final   • Total CO2 05/03/2018 29.3* 22.0 - 29.0 mmol/L Final   • Calcium 05/03/2018 9.5  8.6 - 10.5 mg/dL Final   • Total Protein 05/03/2018 7.3  6.0 - 8.5 g/dL Final   • Albumin 05/03/2018 4.40  3.50 - 5.20 g/dL Final   • Globulin 05/03/2018 2.9  gm/dL Final   • A/G Ratio 05/03/2018 1.5  g/dL Final   • Total Bilirubin 05/03/2018 1.1  0.1 - 1.2 mg/dL Final   • Alkaline Phosphatase 05/03/2018 82  39 - 117 U/L Final   • AST (SGOT)  05/03/2018 18  1 - 40 U/L Final   • ALT (SGPT) 05/03/2018 15  1 - 41 U/L Final   • Total Cholesterol 05/03/2018 144  0 - 200 mg/dL Final   • Triglycerides 05/03/2018 93  0 - 150 mg/dL Final   • HDL Cholesterol 05/03/2018 49  40 - 60 mg/dL Final   • VLDL Cholesterol 05/03/2018 18.6  5 - 40 mg/dL Final   • LDL Cholesterol  05/03/2018 76  0 - 100 mg/dL Final   • Hemoglobin A1C 05/03/2018 7.12* 4.80 - 5.60 % Final    Comment: Hemoglobin A1C Ranges:  Increased Risk for Diabetes  5.7% to 6.4%  Diabetes                     >= 6.5%  Diabetic Goal                < 7.0%     • TSH 05/03/2018 2.530  0.270 - 4.200 mIU/mL Final   • Free T4 05/03/2018 1.13  0.93 - 1.70 ng/dL Final   • Interpretation 05/03/2018 Note   Final    Supplemental report is available.   • PDF Image 05/03/2018 Not applicable   Final     Assessment/Plan   Andreas was seen today for diabetes, hyperlipidemia, hypertension, sleep apnea and coronary artery disease.    Diagnoses and all orders for this visit:    Type 2 diabetes mellitus with complication, with long-term current use of insulin (CMS/[facility])  -     Comprehensive Metabolic Panel  -     Lipid Panel  -     Hemoglobin A1c  -     TSH    Type 2 diabetes mellitus with mild nonproliferative retinopathy of both eyes, with long-term current use of insulin, macular edema presence unspecified (CMS/[facility])  -     Comprehensive Metabolic Panel  -     Lipid Panel  -     Hemoglobin A1c  -     TSH    Type 2 diabetes mellitus with peripheral neuropathy (CMS/[facility])  -     Comprehensive Metabolic Panel  -     Lipid Panel  -     Hemoglobin A1c  -     TSH    Hyperlipidemia, unspecified hyperlipidemia type  -     Comprehensive Metabolic Panel  -     Lipid Panel  -     TSH    Essential hypertension  -     Comprehensive Metabolic Panel    Coronary artery disease involving native coronary artery of native heart without angina pectoris  -     Comprehensive Metabolic Panel  -     Lipid Panel    Paroxysmal atrial fibrillation  (CMS/[facility])    Sleep apnea, unspecified type  -     TSH    S/P angioplasty with stent      Continue Levemir 30 units every morning and 26 units every evening  Continue Humalog 15 units with each meal and metformin 500 mg twice a day.  Continue pravastatin per Dr. Dsouza.  Continue Coreg, Lasix and spironolactone.  Follow with Dr. Barnes.  Flu vaccine this fall.    Send copy of my note to Dr. Dsouza, Dr. James, and Dr. Barnes    RTC 4 mos

## 2018-10-16 DIAGNOSIS — E87.5 SERUM POTASSIUM ELEVATED: Primary | ICD-10-CM

## 2018-10-16 LAB
BUN SERPL-MCNC: 16 MG/DL (ref 8–23)
BUN/CREAT SERPL: 19.3 (ref 7–25)
CALCIUM SERPL-MCNC: 9.6 MG/DL (ref 8.6–10.5)
CHLORIDE SERPL-SCNC: 100 MMOL/L (ref 98–107)
CO2 SERPL-SCNC: 25.1 MMOL/L (ref 22–29)
CREAT SERPL-MCNC: 0.83 MG/DL (ref 0.76–1.27)
GLUCOSE SERPL-MCNC: 136 MG/DL (ref 65–99)
POTASSIUM SERPL-SCNC: 4.5 MMOL/L (ref 3.5–5.2)
SODIUM SERPL-SCNC: 139 MMOL/L (ref 136–145)

## 2018-10-21 ENCOUNTER — RESULTS ENCOUNTER (OUTPATIENT)
Dept: ENDOCRINOLOGY | Age: 68
End: 2018-10-21

## 2018-10-21 DIAGNOSIS — E87.5 SERUM POTASSIUM ELEVATED: ICD-10-CM

## 2018-10-22 ENCOUNTER — OFFICE VISIT (OUTPATIENT)
Dept: CARDIOLOGY | Facility: CLINIC | Age: 68
End: 2018-10-22

## 2018-10-22 ENCOUNTER — CLINICAL SUPPORT NO REQUIREMENTS (OUTPATIENT)
Dept: CARDIOLOGY | Facility: CLINIC | Age: 68
End: 2018-10-22

## 2018-10-22 VITALS
SYSTOLIC BLOOD PRESSURE: 124 MMHG | BODY MASS INDEX: 29.39 KG/M2 | WEIGHT: 217 LBS | HEIGHT: 72 IN | HEART RATE: 73 BPM | DIASTOLIC BLOOD PRESSURE: 80 MMHG

## 2018-10-22 DIAGNOSIS — I42.9 CARDIOMYOPATHY, UNSPECIFIED TYPE (HCC): Primary | ICD-10-CM

## 2018-10-22 DIAGNOSIS — I25.10 CORONARY ARTERY DISEASE INVOLVING NATIVE CORONARY ARTERY OF NATIVE HEART WITHOUT ANGINA PECTORIS: ICD-10-CM

## 2018-10-22 DIAGNOSIS — Z95.0 S/P BIVENTRICULAR CARDIAC PACEMAKER PROCEDURE: ICD-10-CM

## 2018-10-22 DIAGNOSIS — I48.0 PAROXYSMAL ATRIAL FIBRILLATION (HCC): ICD-10-CM

## 2018-10-22 DIAGNOSIS — Z95.820 S/P ANGIOPLASTY WITH STENT: ICD-10-CM

## 2018-10-22 PROCEDURE — 93281 PM DEVICE PROGR EVAL MULTI: CPT | Performed by: INTERNAL MEDICINE

## 2018-10-22 PROCEDURE — 93000 ELECTROCARDIOGRAM COMPLETE: CPT | Performed by: NURSE PRACTITIONER

## 2018-10-22 PROCEDURE — 99213 OFFICE O/P EST LOW 20 MIN: CPT | Performed by: NURSE PRACTITIONER

## 2018-10-22 NOTE — PROGRESS NOTES
Date of Office Visit: 10/22/2018  Encounter Provider: LAM Brown  Place of Service: [facility] CARDIOLOGY  Patient Name: Andreas Rubin  :1950    Chief Complaint   Patient presents with   • Pacemaker Check   • Cardiomyopathy   :     HPI: Andreas Rubin is a 67 y.o. male who is a patient of Dr. James's with history of cardiomyopathy, s/p CRT-P, PAF (no AC due to falls and no AF on device), CAD, s/p stent to mid RCA  and sleep apnea (intol. To CPAP, sleeps /nocturanl O2).     He presents today for routine follow up. He is accompanied by his brother. He is doing good. He denies chest pain, shortness of breath, PND, orthopnea, dizziness or palpitations. He has had no falls but his brother says he is still unsteady on his feet at times, this is not new.      Device interrogation today showed normal CRT-P function. Testing within normal limits.  He is 98% Apaced and 99% BiV paced. He has had 1- 6 beat run of NST and no AF/AT episodes. Optivol stable.            Past Medical History:   Diagnosis Date   • Allergic rhinitis    • ASHD (arteriosclerotic heart disease)    • Atrial fibrillation (CMS/HCC)    • AV block    • Cardiomyopathy (CMS/HCC)    • Cerebellar stroke (CMS/HCC)    • CHF (congestive heart failure) (CMS/HCC)    • Depression    • DM type 2 (diabetes mellitus, type 2) (CMS/HCC)    • Encounter for special screening examination for neoplasm of prostate    • Hyperlipidemia    • Hypertension    • Hypotension    • Imbalance    • Intermittent claudication (CMS/HCC)    • PALMER (obstructive sleep apnea)        Past Surgical History:   Procedure Laterality Date   • BUNIONECTOMY      BILATERAL FEET   • CAROTID STENT      EF=10-15%LEFT MAIN NORMAL , MILD LUMINAL IRR OF LED AND 20% DISEASE OF THE CIRCUMFLEX 80% LESIONIN THE PROX RCA THAT WAS ACTUALLY NONDOMINANT 2.5 X 18 MM VISION BARE METAL STENT IN THE MID RCA    • CATARACT EXTRACTION Bilateral    • COLONOSCOPY  N/A 3/3/2017    Procedure: COLONOSCOPY TO CECUM;  Surgeon: Benton Castellanos MD;  Location: [facility] ENDOSCOPY;  Service:    • CORONARY ANGIOPLASTY WITH STENT PLACEMENT      EF = 10-15%.  Left main was normal, mild luminal irregularities of LAD, and 20% disease of the circumflex.  80% lesion in the prox RCA that was actually nondominant.  2.5 x 18mm Vision bare metal stent in the mid RCA.   • ELBOW PROCEDURE      REMOVAL OF NODULE ON LEFT ELBOW   • KNEE ACL RECONSTRUCTION      LEFT KNEE   • PACEMAKER IMPLANTATION     • PACEMAKER REPLACEMENT         Social History     Social History   • Marital status: Single     Spouse name: N/A   • Number of children: N/A   • Years of education: N/A     Occupational History   • Not on file.     Social History Main Topics   • Smoking status: Never Smoker   • Smokeless tobacco: Never Used      Comment: caffeine use   • Alcohol use Yes      Comment: ocasional   • Drug use: No   • Sexual activity: Not on file     Other Topics Concern   • Not on file     Social History Narrative   • No narrative on file       Family History   Problem Relation Age of Onset   • Lung cancer Mother    • Stroke Father    • Heart attack Father    • Lung cancer Father    • Diabetes Brother    • Hyperlipidemia Brother    • Diabetes Brother    • Throat cancer Brother        Review of Systems   Constitution: Negative for chills, fever and malaise/fatigue.   Cardiovascular: Negative for chest pain, dyspnea on exertion, leg swelling, near-syncope, orthopnea, palpitations, paroxysmal nocturnal dyspnea and syncope.   Respiratory: Negative for cough and shortness of breath.    Musculoskeletal: Negative for joint pain, joint swelling and myalgias.   Gastrointestinal: Negative for abdominal pain, diarrhea, melena, nausea and vomiting.   Genitourinary: Negative for frequency and hematuria.   Neurological: Negative for light-headedness, numbness, paresthesias and seizures.        Unsteady gait but no falls    Allergic/Immunologic: Negative.    All other systems reviewed and are negative.      Allergies   Allergen Reactions   • Codeine Diarrhea     diarrhea   • Codeine Sulfate          Current Outpatient Prescriptions:   •  aspirin 81 MG tablet, Take 1 tablet by mouth Daily., Disp: 30 tablet, Rfl: 11  •  JERRY MICROLET LANCETS lancets, daily., Disp: , Rfl:   •  carvedilol (COREG) 6.25 MG tablet, Take 1 tablet by mouth 2 (Two) Times a Day. (Patient taking differently: Take 12.5 mg by mouth Daily.), Disp: 180 tablet, Rfl: 3  •  citalopram (CeleXA) 20 MG tablet, TAKE 1 TABLET BY MOUTH EVERYDAY. (Patient taking differently: TAKE 1 TABLET BY MOUTH prn), Disp: 30 tablet, Rfl: 4  •  furosemide (LASIX) 40 MG tablet, Take 1.5 tablets by mouth Daily., Disp: 45 tablet, Rfl: 5  •  Glucose Blood (JERRY CONTOUR NEXT TEST VI), 4 (four) times a day., Disp: , Rfl:   •  glucose blood (ONE TOUCH ULTRA TEST) test strip, TESTING BS 1 X DAY DX CODE E11.8, Disp: 100 each, Rfl: 1  •  HUMALOG KWIKPEN 100 UNIT/ML solution pen-injector, 15 units 3 times a day with meals, Disp: 46 mL, Rfl: 1  •  insulin detemir (LEVEMIR FLEXTOUCH) 100 UNIT/ML injection, 30 units every morning and 26 units every evening, Disp: 54 mL, Rfl: 1  •  KLOR-CON 10 MEQ CR tablet, TAKE 1 TABLET BY MOUTH EVERY DAY, Disp: 90 tablet, Rfl: 3  •  metFORMIN (GLUCOPHAGE) 500 MG tablet, TAKE 1 TABLET BY MOUTH 2 (TWO) TIMES A DAY WITH MEALS., Disp: 180 tablet, Rfl: 1  •  Multiple Vitamins-Minerals (MULTI FOR HIM) capsule, Take 1 tablet/day by mouth daily., Disp: , Rfl:   •  nitroglycerin (NITROSTAT) 0.4 MG SL tablet, Place under the tongue., Disp: , Rfl:   •  pravastatin (PRAVACHOL) 20 MG tablet, Take 3 tablets by mouth Every Night., Disp: , Rfl:   •  rOPINIRole (REQUIP) 1 MG tablet, Take 1 tablet by mouth., Disp: , Rfl:   •  spironolactone (ALDACTONE) 25 MG tablet, TAKE 1 TABLET DAILY., Disp: 30 tablet, Rfl: 5  •  vitamin E ([employer] VITAMIN E) 400 UNIT capsule, Take  by mouth., Disp: ,  "Rfl:       Objective:     Vitals:    10/22/18 0836   BP: 124/80   Pulse: 73   Weight: 98.4 kg (217 lb)   Height: 182.9 cm (72.01\")     Body mass index is 29.42 kg/m².    PHYSICAL EXAM:    Vitals Reviewed.   General Appearance: No acute distress, well developed and well nourished.   Eyes: Conjunctiva and lids: No erythema, swelling, or discharge. Sclera non-icteric.   HENT: Atraumatic, normocephalic. External eyes, ears, and nose normal.   Respiratory: No signs of respiratory distress. Respiration rhythm and depth normal.   Clear to auscultation. No rales, crackles, rhonchi, or wheezing auscultated.   Cardiovascular:  Jugular Venous Pressure: Normal  Heart Rate and Rhythm: Normal, Heart Sounds: Normal S1 and S2. No S3 or S4 noted.  Murmurs: No murmurs noted. No rubs, thrills, or gallops.   Arterial Pulses:  Posterior tibialis and dorsalis pedis pulses normal.   Lower Extremities: No edema noted.  Gastrointestinal:  Abdomen soft, non-distended, non-tender.   Musculoskeletal: Normal movement of extremities  Skin and Nails: General appearance normal. No pallor, cyanosis, diaphoresis. Skin temperature normal. No clubbing of fingernails.   Psychiatric: Patient alert and oriented to person, place, and time. Speech and behavior appropriate. Normal mood and affect.       ECG 12 Lead  Date/Time: 10/22/2018 8:42 AM  Performed by: LANCE GEIGER  Authorized by: LANCE GEIGER   Comparison: compared with previous ECG from 2018  Similar to previous ECG  Rhythm: paced  BPM: 73  Pacin% capture  Comments: AP/, good CRT pacing              Assessment:       Diagnosis Plan   1. Cardiomyopathy, unspecified type (CMS/HCC)     2. S/P biventricular cardiac pacemaker procedure     3. Paroxysmal atrial fibrillation (CMS/HCC)     4. Coronary artery disease involving native coronary artery of native heart without angina pectoris     5. S/P angioplasty with stent            Plan:       1.-2. CM, s/pCRT-P. Doing well. No HF by " exam. Normal CRT function.    3. PAF, no episodes by device log. Dr. James stopped AC due to falls/unsteady gait and no AF on device.    4.-5. CAD, s/p PCI 2011. No chest pain, stable.     Follow up with Dr. James in 6 months.     As always, it has been a pleasure to participate in your patient's care.      Sincerely,         LAM Shaw

## 2018-12-10 RX ORDER — PRAVASTATIN SODIUM 80 MG/1
TABLET ORAL
Qty: 90 TABLET | Refills: 1 | OUTPATIENT
Start: 2018-12-10

## 2018-12-10 RX ORDER — ATORVASTATIN CALCIUM 40 MG/1
40 TABLET, FILM COATED ORAL DAILY
Qty: 30 TABLET | Refills: 5 | Status: SHIPPED | OUTPATIENT
Start: 2018-12-10 | End: 2019-04-11 | Stop reason: SDUPTHER

## 2018-12-26 ENCOUNTER — HOSPITAL ENCOUNTER (EMERGENCY)
Facility: HOSPITAL | Age: 68
Discharge: HOME | End: 2018-12-26
Attending: EMERGENCY MEDICINE | Admitting: EMERGENCY MEDICINE

## 2018-12-26 ENCOUNTER — CLINICAL SUPPORT NO REQUIREMENTS (OUTPATIENT)
Dept: CARDIOLOGY | Facility: CLINIC | Age: 68
End: 2018-12-26

## 2018-12-26 ENCOUNTER — APPOINTMENT (OUTPATIENT)
Dept: GENERAL RADIOLOGY | Facility: HOSPITAL | Age: 68
End: 2018-12-26

## 2018-12-26 ENCOUNTER — TELEPHONE (OUTPATIENT)
Dept: CARDIOLOGY | Facility: CLINIC | Age: 68
End: 2018-12-26

## 2018-12-26 ENCOUNTER — APPOINTMENT (OUTPATIENT)
Dept: CT IMAGING | Facility: HOSPITAL | Age: 68
End: 2018-12-26

## 2018-12-26 VITALS
HEIGHT: 74 IN | RESPIRATION RATE: 18 BRPM | WEIGHT: 224 LBS | OXYGEN SATURATION: 98 % | SYSTOLIC BLOOD PRESSURE: 130 MMHG | TEMPERATURE: 98.3 F | BODY MASS INDEX: 28.75 KG/M2 | DIASTOLIC BLOOD PRESSURE: 93 MMHG | HEART RATE: 70 BPM

## 2018-12-26 DIAGNOSIS — I42.9 CARDIOMYOPATHY, UNSPECIFIED TYPE (HCC): Primary | ICD-10-CM

## 2018-12-26 DIAGNOSIS — R53.81 MALAISE AND FATIGUE: Primary | ICD-10-CM

## 2018-12-26 DIAGNOSIS — R53.83 MALAISE AND FATIGUE: Primary | ICD-10-CM

## 2018-12-26 LAB
ALBUMIN SERPL-MCNC: 4.1 G/DL (ref 3.5–5.2)
ALBUMIN/GLOB SERPL: 1.2 G/DL
ALP SERPL-CCNC: 102 U/L (ref 39–117)
ALT SERPL W P-5'-P-CCNC: 17 U/L (ref 1–41)
AMPHET+METHAMPHET UR QL: NEGATIVE
ANION GAP SERPL CALCULATED.3IONS-SCNC: 12.2 MMOL/L
AST SERPL-CCNC: 27 U/L (ref 1–40)
BACTERIA UR QL AUTO: ABNORMAL /HPF
BARBITURATES UR QL SCN: NEGATIVE
BASOPHILS # BLD AUTO: 0.07 10*3/MM3 (ref 0–0.2)
BASOPHILS NFR BLD AUTO: 1.3 % (ref 0–1.5)
BENZODIAZ UR QL SCN: NEGATIVE
BILIRUB SERPL-MCNC: 1.7 MG/DL (ref 0.1–1.2)
BILIRUB UR QL STRIP: NEGATIVE
BUN BLD-MCNC: 15 MG/DL (ref 8–23)
BUN/CREAT SERPL: 20.3 (ref 7–25)
CALCIUM SPEC-SCNC: 9.3 MG/DL (ref 8.6–10.5)
CANNABINOIDS SERPL QL: NEGATIVE
CHLORIDE SERPL-SCNC: 104 MMOL/L (ref 98–107)
CLARITY UR: CLEAR
CO2 SERPL-SCNC: 22.8 MMOL/L (ref 22–29)
COCAINE UR QL: NEGATIVE
COLOR UR: YELLOW
CREAT BLD-MCNC: 0.74 MG/DL (ref 0.76–1.27)
DEPRECATED RDW RBC AUTO: 56 FL (ref 37–54)
EOSINOPHIL # BLD AUTO: 0.13 10*3/MM3 (ref 0–0.7)
EOSINOPHIL NFR BLD AUTO: 2.4 % (ref 0.3–6.2)
ERYTHROCYTE [DISTWIDTH] IN BLOOD BY AUTOMATED COUNT: 15.7 % (ref 11.5–14.5)
ETHANOL BLD-MCNC: <10 MG/DL (ref 0–10)
ETHANOL UR QL: <0.01 %
GFR SERPL CREATININE-BSD FRML MDRD: 105 ML/MIN/1.73
GLOBULIN UR ELPH-MCNC: 3.5 GM/DL
GLUCOSE BLD-MCNC: 147 MG/DL (ref 65–99)
GLUCOSE UR STRIP-MCNC: NEGATIVE MG/DL
HCT VFR BLD AUTO: 46.4 % (ref 40.4–52.2)
HGB BLD-MCNC: 15.5 G/DL (ref 13.7–17.6)
HGB UR QL STRIP.AUTO: ABNORMAL
HYALINE CASTS UR QL AUTO: ABNORMAL /LPF
IMM GRANULOCYTES # BLD AUTO: 0.02 10*3/MM3 (ref 0–0.03)
IMM GRANULOCYTES NFR BLD AUTO: 0.4 % (ref 0–0.5)
KETONES UR QL STRIP: ABNORMAL
LEUKOCYTE ESTERASE UR QL STRIP.AUTO: NEGATIVE
LYMPHOCYTES # BLD AUTO: 1.02 10*3/MM3 (ref 0.9–4.8)
LYMPHOCYTES NFR BLD AUTO: 18.6 % (ref 19.6–45.3)
MCH RBC QN AUTO: 32.5 PG (ref 27–32.7)
MCHC RBC AUTO-ENTMCNC: 33.4 G/DL (ref 32.6–36.4)
MCV RBC AUTO: 97.3 FL (ref 79.8–96.2)
METHADONE UR QL SCN: NEGATIVE
MONOCYTES # BLD AUTO: 0.66 10*3/MM3 (ref 0.2–1.2)
MONOCYTES NFR BLD AUTO: 12.1 % (ref 5–12)
NEUTROPHILS # BLD AUTO: 3.59 10*3/MM3 (ref 1.9–8.1)
NEUTROPHILS NFR BLD AUTO: 65.6 % (ref 42.7–76)
NITRITE UR QL STRIP: NEGATIVE
NRBC BLD AUTO-RTO: 0 /100 WBC (ref 0–0)
OPIATES UR QL: NEGATIVE
OXYCODONE UR QL SCN: NEGATIVE
PH UR STRIP.AUTO: <=5 [PH] (ref 5–8)
PLATELET # BLD AUTO: 147 10*3/MM3 (ref 140–500)
PMV BLD AUTO: 11.8 FL (ref 6–12)
POTASSIUM BLD-SCNC: 4.3 MMOL/L (ref 3.5–5.2)
PROT SERPL-MCNC: 7.6 G/DL (ref 6–8.5)
PROT UR QL STRIP: NEGATIVE
RBC # BLD AUTO: 4.77 10*6/MM3 (ref 4.6–6)
RBC # UR: ABNORMAL /HPF
REF LAB TEST METHOD: ABNORMAL
SODIUM BLD-SCNC: 139 MMOL/L (ref 136–145)
SP GR UR STRIP: 1.03 (ref 1–1.03)
SQUAMOUS #/AREA URNS HPF: ABNORMAL /HPF
UROBILINOGEN UR QL STRIP: ABNORMAL
WBC NRBC COR # BLD: 5.47 10*3/MM3 (ref 4.5–10.7)
WBC UR QL AUTO: ABNORMAL /HPF

## 2018-12-26 PROCEDURE — 80307 DRUG TEST PRSMV CHEM ANLYZR: CPT | Performed by: EMERGENCY MEDICINE

## 2018-12-26 PROCEDURE — 99284 EMERGENCY DEPT VISIT MOD MDM: CPT

## 2018-12-26 PROCEDURE — 81001 URINALYSIS AUTO W/SCOPE: CPT | Performed by: EMERGENCY MEDICINE

## 2018-12-26 PROCEDURE — 70450 CT HEAD/BRAIN W/O DYE: CPT

## 2018-12-26 PROCEDURE — 93296 REM INTERROG EVL PM/IDS: CPT | Performed by: INTERNAL MEDICINE

## 2018-12-26 PROCEDURE — 85025 COMPLETE CBC W/AUTO DIFF WBC: CPT | Performed by: EMERGENCY MEDICINE

## 2018-12-26 PROCEDURE — 80053 COMPREHEN METABOLIC PANEL: CPT | Performed by: EMERGENCY MEDICINE

## 2018-12-26 PROCEDURE — 93294 REM INTERROG EVL PM/LDLS PM: CPT | Performed by: INTERNAL MEDICINE

## 2018-12-26 PROCEDURE — 71046 X-RAY EXAM CHEST 2 VIEWS: CPT

## 2018-12-26 PROCEDURE — P9612 CATHETERIZE FOR URINE SPEC: HCPCS

## 2018-12-26 NOTE — TELEPHONE ENCOUNTER
Call from Triage nurse Sharita reporting pts brother has requested to send a remote due to pt very sleepy and has fallen out of his chair. Remote received and reviewed. Normal CRT-P function. Parameters wnl. Trends stable. Presents AP/BVP @81bpm. Apace 94.6%. Total Vpace 98.4%. Of that BVpace 98.3%. VSRpace 0.6%. VS 1%. 1 NST episode from 12/14 0715 shows 5 beats @188bpm. This is not new to pt. No other ventricular events recorded. 1 AT/AF on counters for total time 2 sec. No egm. [place] is <0.1%. Optivol reviewed and shows grad increase of fld index to  ~140 since 11/1/18. This is also reflected in Thoracic imp trending below baseline. TI has decreased from ~55? to ~45?. Brother was advised by the triage nurse to take pt to the ER.  NS

## 2018-12-26 NOTE — TELEPHONE ENCOUNTER
12/26/18  2:48 PM  Andreas Rubin  1950    Home Phone [phone]   Work Phone [phone]       Andreas Rubin is a patient of Dr. James/Angy. Brother, Johann, is calling to say Nhan cannot stay awake during the day, to the point of falling out of his chair.    Asked if he could get a BP/HR and call me back. Pt agreeable. Pt is paced and is currently taking:    Furosemide 60mg daily  Carvedilol 6.25mg BID  Spironolactone 25mg daily    Brother wanted to have his pm transmitted, but was advised that it would not show the reason for his fatigue, only that the pm is firing.     Advised him to take him to the ER today, as by tomorrow he could be way worse.     Sharita Griffin  Triage RN

## 2018-12-28 RX ORDER — INSULIN LISPRO 100 [IU]/ML
INJECTION, SOLUTION INTRAVENOUS; SUBCUTANEOUS
Qty: 45 ML | Refills: 0 | Status: SHIPPED | OUTPATIENT
Start: 2018-12-28 | End: 2019-02-04 | Stop reason: SDUPTHER

## 2019-01-08 ENCOUNTER — TELEPHONE (OUTPATIENT)
Dept: INTERNAL MEDICINE | Facility: CLINIC | Age: 69
End: 2019-01-08

## 2019-01-08 RX ORDER — AZITHROMYCIN 250 MG/1
TABLET, FILM COATED ORAL
Qty: 6 TABLET | Refills: 0 | Status: SHIPPED | OUTPATIENT
Start: 2019-01-08 | End: 2019-01-22

## 2019-01-08 NOTE — TELEPHONE ENCOUNTER
I sent a Z-Michael.  He needs to cut citalopram in half while taking zpac.  If that is too difficult cancel and I will give something else. (Omnicef 300 bid #[phone])

## 2019-01-08 NOTE — TELEPHONE ENCOUNTER
Pt started last night with a sore throat, runny nose and feels lousy.  He has no temp and no shortness of breath and would rather not come into the office if he doesn't need to  He is req a RX

## 2019-01-22 ENCOUNTER — OFFICE VISIT (OUTPATIENT)
Dept: INTERNAL MEDICINE | Facility: CLINIC | Age: 69
End: 2019-01-22

## 2019-01-22 VITALS
WEIGHT: 224 LBS | HEIGHT: 74 IN | HEART RATE: 77 BPM | RESPIRATION RATE: 16 BRPM | BODY MASS INDEX: 28.75 KG/M2 | DIASTOLIC BLOOD PRESSURE: 76 MMHG | TEMPERATURE: 98.9 F | OXYGEN SATURATION: 97 % | SYSTOLIC BLOOD PRESSURE: 129 MMHG

## 2019-01-22 DIAGNOSIS — I48.0 PAROXYSMAL ATRIAL FIBRILLATION (HCC): ICD-10-CM

## 2019-01-22 DIAGNOSIS — R23.2 HOT FLASHES: Primary | ICD-10-CM

## 2019-01-22 DIAGNOSIS — G11.9 CEREBELLAR ATAXIA (HCC): ICD-10-CM

## 2019-01-22 DIAGNOSIS — E55.9 HYPOVITAMINOSIS D: ICD-10-CM

## 2019-01-22 DIAGNOSIS — R05.9 COUGH: ICD-10-CM

## 2019-01-22 DIAGNOSIS — R53.83 FATIGUE, UNSPECIFIED TYPE: ICD-10-CM

## 2019-01-22 DIAGNOSIS — I42.0 DILATED CARDIOMYOPATHY (HCC): ICD-10-CM

## 2019-01-22 PROCEDURE — 99214 OFFICE O/P EST MOD 30 MIN: CPT | Performed by: NURSE PRACTITIONER

## 2019-01-22 NOTE — PROGRESS NOTES
"Subjective   Andreas Rubin is a 68 y.o. male.   Chief Complaint   Patient presents with   • Hot Flashes   • Cough     intermittent   • Fatigue       Patient presents for evaluation of fatigue and hot flashes.  This is a 68-year-old male patient of Dr. sandhu with a history of A. fib with pacemaker, hypertension, hyperlipidemia, type 2 diabetes, ataxia, CVA, intellectual disability.  His brother accompanies today who helps provide history of present illness.  He presented to the ED in December 2018 complaining of frequent episodes of sleeping and persistent fatigue.  His brother states that he often falls asleep in \" inappropriate\" places such as the dinner table.  A CT of the head was done which was normal.  He states that he does follow with neurologist, Dr. richmond, and has an appointment in March.  For the past 2 weeks, the patient has been having hot flashes and sweats periodically, accompanied by persistent cough that \"sometimes gets so bad that he gags.\"  He recently was on azithromycin, due to exposure to his brother who had a respiratory infection.  The chronic fatigue however has been an issue for \"several months, going on a year.\"  His temperature has been low-grade at home.  He denies shortness of breath or chest discomfort.  He does endorse some postnasal drip and rhinorrhea.  His brother states that he does have a history of COPD, but has not been on any maintenance medications \"in years.\"  He denies development of any other new issues today.         The following portions of the patient's history were reviewed and updated as appropriate: allergies, current medications, past family history, past medical history, past social history, past surgical history and problem list.    Review of Systems   Constitutional: Positive for fatigue. Negative for activity change, chills, fever, unexpected weight gain and unexpected weight loss.   HENT: Positive for congestion. Negative for hearing loss, postnasal drip, " "sinus pressure, sneezing, sore throat and tinnitus.    Eyes: Negative for photophobia, pain and visual disturbance.   Respiratory: Positive for cough. Negative for chest tightness, shortness of breath and wheezing.    Cardiovascular: Negative for chest pain, palpitations and leg swelling.   Gastrointestinal: Negative for abdominal distention, abdominal pain, constipation, diarrhea, nausea and vomiting.   Endocrine: Negative for polydipsia, polyphagia and polyuria.   Genitourinary: Negative for dysuria, frequency, hematuria and urgency.   Neurological: Negative for dizziness, weakness, numbness and headache.   Psychiatric/Behavioral: Positive for agitation and sleep disturbance. Negative for self-injury, suicidal ideas and stress. The patient is nervous/anxious.    All other systems reviewed and are negative.      Objective    /76 (BP Location: Left arm, Patient Position: Sitting, Cuff Size: Small Adult)   Pulse 77   Temp 98.9 °F (37.2 °C) (Oral)   Resp 16   Ht 188 cm (74\")   Wt 102 kg (224 lb)   SpO2 97%   BMI 28.76 kg/m²     Physical Exam   Constitutional: He is oriented to person, place, and time. He appears well-developed and well-nourished. He does not appear ill. No distress.   HENT:   Head: Normocephalic and atraumatic.   Right Ear: Hearing, tympanic membrane, external ear and ear canal normal.   Left Ear: Hearing, tympanic membrane, external ear and ear canal normal.   Nose: Mucosal edema and rhinorrhea present. No congestion. Right sinus exhibits no maxillary sinus tenderness and no frontal sinus tenderness. Left sinus exhibits no maxillary sinus tenderness and no frontal sinus tenderness.   Mouth/Throat: Uvula is midline, oropharynx is clear and moist and mucous membranes are normal. No oropharyngeal exudate, posterior oropharyngeal edema, posterior oropharyngeal erythema or tonsillar abscesses.   Eyes: Conjunctivae and EOM are normal. Pupils are equal, round, and reactive to light. Right eye " exhibits no discharge. Left eye exhibits no discharge.   Neck: Normal range of motion. Neck supple. No JVD present. No tracheal deviation present. No thyromegaly present.   Cardiovascular: Normal rate, regular rhythm, normal heart sounds and intact distal pulses. Exam reveals no gallop and no friction rub.   No murmur heard.  Pulmonary/Chest: Effort normal and breath sounds normal. No stridor. No respiratory distress. He has no wheezes. He has no rales. He exhibits no tenderness.   Lungs are CTA bilaterally   Abdominal: Soft. Bowel sounds are normal. He exhibits no distension. There is no tenderness.   Bowel sounds active ×4 quadrants.  No pain to light or deep palpation ×4 quadrants   Musculoskeletal: Normal range of motion. He exhibits no edema or deformity.   Lymphadenopathy:     He has no cervical adenopathy.   Neurological: He is alert and oriented to person, place, and time. No cranial nerve deficit. He exhibits normal muscle tone. Coordination abnormal.   Skin: Skin is warm and dry. Capillary refill takes less than 2 seconds. He is not diaphoretic.   Psychiatric: He has a normal mood and affect. His behavior is normal. Judgment and thought content normal.   Nursing note and vitals reviewed.        Assessment/Plan   Andreas was seen today for hot flashes, cough and fatigue.    Diagnoses and all orders for this visit:    Hot flashes  -     Vitamin D 25 Hydroxy  -     Testosterone  -     TSH  -     T4, Free  -     Vitamin B12  -     Folate  -     CBC & Differential  -     Comprehensive metabolic panel  -     Urine Culture - Urine, Urine, Clean Catch  -     Urinalysis With Microscopic - Urine, Clean Catch    Fatigue, unspecified type  -     Vitamin D 25 Hydroxy  -     Testosterone  -     TSH  -     T4, Free  -     Vitamin B12  -     Folate  -     CBC & Differential  -     Comprehensive metabolic panel  -     Urine Culture - Urine, Urine, Clean Catch  -     Urinalysis With Microscopic - Urine, Clean Catch  -      MRI Brain Without Contrast; Future    Hypovitaminosis D  -     Vitamin D 25 Hydroxy    Cough  -     XR Chest 2 View      - Hot flashes, fatigue: We will evaluate vitamin D, testosterone, free T4, TSH, vit D, folate, vitamin B12, CBC, CMP, urine culture with microscopic UA to rule out etiologies.  We'll also obtain MRI of the brain without contrast for evaluation of fatigue and frequent sleeping.  He is not having syncopal episodes, but his brother states that he has been falling asleep in inappropriate places for months now.    - Cough: We'll evaluate chest x-ray to rule out intrapulmonary etiology.  If this comes back showing chronic changes, we will restart COPD maintenance medications including Symbicort and Albuterol.     - Follow-up with neurology, Dr. Irene, in March 2019 per previously scheduled appointment.  We will contact him with results of his labs, chest x-ray, and MRI, and any further recommendations.  Follow-up when necessary if symptoms persist or worsen.  Follow up routinely with PCP, Dr. sandhu.

## 2019-01-23 ENCOUNTER — HOSPITAL ENCOUNTER (OUTPATIENT)
Dept: GENERAL RADIOLOGY | Facility: HOSPITAL | Age: 69
Discharge: HOME | End: 2019-01-23
Admitting: NURSE PRACTITIONER

## 2019-01-23 ENCOUNTER — TELEPHONE (OUTPATIENT)
Dept: INTERNAL MEDICINE | Facility: CLINIC | Age: 69
End: 2019-01-23

## 2019-01-23 DIAGNOSIS — R23.2 HOT FLASHES: Primary | ICD-10-CM

## 2019-01-23 DIAGNOSIS — G47.10 INCREASED SLEEPING: ICD-10-CM

## 2019-01-23 LAB
25(OH)D3+25(OH)D2 SERPL-MCNC: 42.9 NG/ML (ref 30–100)
ALBUMIN SERPL-MCNC: 4.6 G/DL (ref 3.5–5.2)
ALBUMIN/GLOB SERPL: 1.6 G/DL
ALP SERPL-CCNC: 107 U/L (ref 39–117)
ALT SERPL-CCNC: 17 U/L (ref 1–41)
AST SERPL-CCNC: 26 U/L (ref 1–40)
BASOPHILS # BLD AUTO: 0.07 10*3/MM3 (ref 0–0.2)
BASOPHILS NFR BLD AUTO: 1.2 % (ref 0–1.5)
BILIRUB SERPL-MCNC: 1.5 MG/DL (ref 0.1–1.2)
BUN SERPL-MCNC: 17 MG/DL (ref 8–23)
BUN/CREAT SERPL: 19.8 (ref 7–25)
CALCIUM SERPL-MCNC: 10 MG/DL (ref 8.6–10.5)
CHLORIDE SERPL-SCNC: 96 MMOL/L (ref 98–107)
CO2 SERPL-SCNC: 29.3 MMOL/L (ref 22–29)
CREAT SERPL-MCNC: 0.86 MG/DL (ref 0.76–1.27)
EOSINOPHIL # BLD AUTO: 0.18 10*3/MM3 (ref 0–0.7)
EOSINOPHIL NFR BLD AUTO: 3 % (ref 0.3–6.2)
ERYTHROCYTE [DISTWIDTH] IN BLOOD BY AUTOMATED COUNT: 15.9 % (ref 11.5–14.5)
FOLATE SERPL-MCNC: 9.84 NG/ML (ref 4.78–24.2)
GLOBULIN SER CALC-MCNC: 2.8 GM/DL
GLUCOSE SERPL-MCNC: 139 MG/DL (ref 65–99)
HCT VFR BLD AUTO: 49 % (ref 40.4–52.2)
HGB BLD-MCNC: 16.2 G/DL (ref 13.7–17.6)
IMM GRANULOCYTES # BLD AUTO: 0.01 10*3/MM3 (ref 0–0.03)
IMM GRANULOCYTES NFR BLD AUTO: 0.2 % (ref 0–0.5)
LYMPHOCYTES # BLD AUTO: 1.41 10*3/MM3 (ref 0.9–4.8)
LYMPHOCYTES NFR BLD AUTO: 23.2 % (ref 19.6–45.3)
MCH RBC QN AUTO: 32.3 PG (ref 27–32.7)
MCHC RBC AUTO-ENTMCNC: 33.1 G/DL (ref 32.6–36.4)
MCV RBC AUTO: 97.6 FL (ref 79.8–96.2)
MONOCYTES # BLD AUTO: 0.79 10*3/MM3 (ref 0.2–1.2)
MONOCYTES NFR BLD AUTO: 13 % (ref 5–12)
NEUTROPHILS # BLD AUTO: 3.63 10*3/MM3 (ref 1.9–8.1)
NEUTROPHILS NFR BLD AUTO: 59.6 % (ref 42.7–76)
PLATELET # BLD AUTO: 174 10*3/MM3 (ref 140–500)
POTASSIUM SERPL-SCNC: 4.5 MMOL/L (ref 3.5–5.2)
PROT SERPL-MCNC: 7.4 G/DL (ref 6–8.5)
RBC # BLD AUTO: 5.02 10*6/MM3 (ref 4.6–6)
SODIUM SERPL-SCNC: 140 MMOL/L (ref 136–145)
T4 FREE SERPL-MCNC: 1.33 NG/DL (ref 0.93–1.7)
TESTOST SERPL-MCNC: 193 NG/DL (ref 264–916)
TSH SERPL DL<=0.005 MIU/L-ACNC: 2.96 MIU/ML (ref 0.27–4.2)
UNABLE TO VOID: NORMAL
VIT B12 SERPL-MCNC: 663 PG/ML (ref 211–946)
WBC # BLD AUTO: 6.08 10*3/MM3 (ref 4.5–10.7)

## 2019-01-23 PROCEDURE — 71046 X-RAY EXAM CHEST 2 VIEWS: CPT

## 2019-01-23 NOTE — TELEPHONE ENCOUNTER
Called and spoke with Nino, patient's brother, who is going to call today for a cardiology appointment with Dr. James's office.  Informed him that he have ordered an echocardiogram for evaluation of the heart.  Patient will come back for the chest x-ray soon.  He will also come back to leave a urine sample.  I also informed them that I have entered a referral to sleep medicine for further evaluation of his fatigue and abnormal sleeping.

## 2019-01-23 NOTE — TELEPHONE ENCOUNTER
Attempted to call patient with results of his labs, no answer, left message for Nino Rubin, brother, to call the office back.  I have discussed the patient's case with Dr. sandhu, PCP.  We will order a repeat echo and ask the patient to see his cardiologist, Dr. James, sooner than his July appointment.  I am also going to order referral to sleep medicine.

## 2019-01-23 NOTE — PROGRESS NOTES
I called patient but did not get an answer. Please inform patient's brother Nino that his labs came back stable and didn't show the cause for his increased sleeping and hot flashes. We will proceed with the brain MRI, but also I would like him to see his cardiologist as soon as possible for an appointment, as I want to make sure this is not cardiac-related. I have also ordered an echocardiogram. I would also like for him to see sleep medicine to evaluate the frequent sleeping and increased fatigue. The referral is entered.

## 2019-01-24 NOTE — PROGRESS NOTES
Please inform patient/brother that his chest x-ray appears unchanged from previous, with no acute pulmonary issues.

## 2019-01-25 ENCOUNTER — TELEPHONE (OUTPATIENT)
Dept: CARDIOLOGY | Facility: CLINIC | Age: 69
End: 2019-01-25

## 2019-01-25 ENCOUNTER — CLINICAL SUPPORT NO REQUIREMENTS (OUTPATIENT)
Dept: CARDIOLOGY | Facility: CLINIC | Age: 69
End: 2019-01-25

## 2019-01-25 DIAGNOSIS — I42.9 CARDIOMYOPATHY, UNSPECIFIED TYPE (HCC): Primary | ICD-10-CM

## 2019-01-25 LAB
APPEARANCE UR: CLEAR
BACTERIA #/AREA URNS HPF: NORMAL /HPF
BACTERIA UR CULT: NO GROWTH
BACTERIA UR CULT: NORMAL
BILIRUB UR QL STRIP: NEGATIVE
CASTS URNS MICRO: NORMAL
COLOR UR: YELLOW
EPI CELLS #/AREA URNS HPF: NORMAL /HPF
GLUCOSE UR QL: NEGATIVE
HGB UR QL STRIP: NEGATIVE
KETONES UR QL STRIP: NEGATIVE
LEUKOCYTE ESTERASE UR QL STRIP: NEGATIVE
NITRITE UR QL STRIP: NEGATIVE
PH UR STRIP: 5.5 [PH] (ref 5–8)
PROT UR QL STRIP: NEGATIVE
RBC #/AREA URNS HPF: NORMAL /HPF
SP GR UR: 1.02 (ref 1–1.03)
UROBILINOGEN UR STRIP-MCNC: (no result) MG/DL
WBC #/AREA URNS HPF: NORMAL /HPF

## 2019-01-25 NOTE — TELEPHONE ENCOUNTER
Remote transmission crt-p.  1 nst vt, 17 beats on 1/1/19.  2 ahr, longest 13 hours in December.  I called brother to let him know we got transmission and he has a lot of concerns about pt's condition.  He states saw LAM at Dr. Dsouza office this week and they recommended that pt see you or Angy this week.  He said soonest he could get apt was 2/21/19 to see Angy and longer for dr. James.  He says for about a month pt is suddenly falling asleep and falling over.  He has even fallen into his plate while eating. He says pt has been sleeping up to 20 hours a day for over a year now, but falling asleep while doing something is new.  He was under the impression they thought it was his heart.  I let him know normal CRT-p function.      He just called me back and states hot flashes for about a week so bad that he is going outside in the middle of the night without his shirt on just to cool down.

## 2019-01-25 NOTE — TELEPHONE ENCOUNTER
Samia you can offer him a Monday slot at 11am since there was a cancellation if they want to come in then

## 2019-01-25 NOTE — TELEPHONE ENCOUNTER
I called and s/w with pt's brother, Johann. They took the Monday appt. Can you add him on for Angy please. He will also need a PM check at 1030 also. I told them to be here at 1030. Thanks ........Samia

## 2019-01-28 ENCOUNTER — CLINICAL SUPPORT NO REQUIREMENTS (OUTPATIENT)
Dept: CARDIOLOGY | Facility: CLINIC | Age: 69
End: 2019-01-28

## 2019-01-28 ENCOUNTER — LAB (OUTPATIENT)
Dept: LAB | Facility: HOSPITAL | Age: 69
End: 2019-01-28

## 2019-01-28 ENCOUNTER — OFFICE VISIT (OUTPATIENT)
Dept: CARDIOLOGY | Facility: CLINIC | Age: 69
End: 2019-01-28

## 2019-01-28 VITALS
HEIGHT: 74 IN | HEART RATE: 74 BPM | SYSTOLIC BLOOD PRESSURE: 102 MMHG | WEIGHT: 207 LBS | BODY MASS INDEX: 26.56 KG/M2 | DIASTOLIC BLOOD PRESSURE: 60 MMHG

## 2019-01-28 DIAGNOSIS — Z95.820 S/P ANGIOPLASTY WITH STENT: ICD-10-CM

## 2019-01-28 DIAGNOSIS — R23.2 HOT FLASHES: ICD-10-CM

## 2019-01-28 DIAGNOSIS — I25.5 ISCHEMIC CARDIOMYOPATHY: Primary | ICD-10-CM

## 2019-01-28 DIAGNOSIS — G47.30 SLEEP APNEA, UNSPECIFIED TYPE: ICD-10-CM

## 2019-01-28 DIAGNOSIS — I25.10 CORONARY ARTERY DISEASE INVOLVING NATIVE CORONARY ARTERY OF NATIVE HEART WITHOUT ANGINA PECTORIS: ICD-10-CM

## 2019-01-28 DIAGNOSIS — Z95.0 S/P BIVENTRICULAR CARDIAC PACEMAKER PROCEDURE: ICD-10-CM

## 2019-01-28 DIAGNOSIS — I10 ESSENTIAL HYPERTENSION: ICD-10-CM

## 2019-01-28 DIAGNOSIS — I25.5 ISCHEMIC CARDIOMYOPATHY: ICD-10-CM

## 2019-01-28 DIAGNOSIS — R53.83 FATIGUE, UNSPECIFIED TYPE: ICD-10-CM

## 2019-01-28 DIAGNOSIS — I50.9 CONGESTIVE HEART FAILURE, UNSPECIFIED HF CHRONICITY, UNSPECIFIED HEART FAILURE TYPE (HCC): Primary | ICD-10-CM

## 2019-01-28 LAB — ERYTHROCYTE [SEDIMENTATION RATE] IN BLOOD: 2 MM/HR (ref 0–20)

## 2019-01-28 PROCEDURE — 93000 ELECTROCARDIOGRAM COMPLETE: CPT | Performed by: NURSE PRACTITIONER

## 2019-01-28 PROCEDURE — 93281 PM DEVICE PROGR EVAL MULTI: CPT | Performed by: INTERNAL MEDICINE

## 2019-01-28 PROCEDURE — 99214 OFFICE O/P EST MOD 30 MIN: CPT | Performed by: NURSE PRACTITIONER

## 2019-01-28 PROCEDURE — 85652 RBC SED RATE AUTOMATED: CPT

## 2019-01-28 PROCEDURE — 36415 COLL VENOUS BLD VENIPUNCTURE: CPT

## 2019-01-28 NOTE — PROGRESS NOTES
Date of Office Visit: 2019  Encounter Provider: LAM Brown  Place of Service: [facility] CARDIOLOGY  Patient Name: Andreas Rubin  :1950    Chief Complaint   Patient presents with   • Pacemaker Check   • Fatigue   • Cardiomyopathy   :     HPI: Andreas Rubin is a 68 y.o. male who is a patient of Dr. James's with hx of CM, s/p CRT-P, PAF (no AC due to falls and no AF on device), CAD, s/p stent to mid RCA , intellectual disability, cerebellar ataxia & stroke (follows with Dr. Irene) and sleep apnea (intol to CPAP). He lives with his brother who helps care for him.      I saw him in Oct and he was doing pretty well. He was in ED  with altered mental status, work up was negative and he was discharged home.     He saw PCP on  w/complaints of fatigue and hot flashes. He had lab work, CXR and urine and they felt like he needed to see cardiology. Reviewed his labs and CXR, no significant abnormalities. A remote check of his device was done on  and was okay with normal function. He presents today for further evaluation.     He is accompanied by his brother. He reports that he wants to sleep all the time and if they try to do anything, he doesn't really want to do it or has to stop and take a break. He even falls asleep during meals. He denies chest pain, no shortness of breath at rest, some mild intermittent lower extremity edema.     He was using nocturnal oxygen for his sleep apnea because he is intolerant to CPAP but his brother says he hasn;t used it for over a year now because they just haven't followed up for it to get renewed on a yearly basis per Medicare (he followed with Dr. Barnes).     Device shows normal function and testing. He has A paced 93%, BiV paced 99%, AT/AF burden 0.6%, 2 AF episodes, both on 19, 12 hrs and 2 hrs in duration, 2 NSVT episodes, 1-17 beats in duration and the other 10 beats.        Past Medical History:    Diagnosis Date   • Allergic rhinitis    • ASHD (arteriosclerotic heart disease)    • Atrial fibrillation (CMS/HCC)    • AV block    • Cardiomyopathy (CMS/HCC)    • Cerebellar stroke (CMS/HCC)    • CHF (congestive heart failure) (CMS/HCC)    • Depression    • DM type 2 (diabetes mellitus, type 2) (CMS/HCC)    • Encounter for special screening examination for neoplasm of prostate    • Hyperlipidemia    • Hypertension    • Hypotension    • Imbalance    • Intermittent claudication (CMS/HCC)    • PALMER (obstructive sleep apnea)        Past Surgical History:   Procedure Laterality Date   • BUNIONECTOMY      BILATERAL FEET   • CAROTID STENT      EF=10-15%LEFT MAIN NORMAL , MILD LUMINAL IRR OF LED AND 20% DISEASE OF THE CIRCUMFLEX 80% LESIONIN THE PROX RCA THAT WAS ACTUALLY NONDOMINANT 2.5 X 18 MM VISION BARE METAL STENT IN THE MID RCA    • CATARACT EXTRACTION Bilateral    • COLONOSCOPY N/A 3/3/2017    Procedure: COLONOSCOPY TO CECUM;  Surgeon: Benton Castellanos MD;  Location: [facility] ENDOSCOPY;  Service:    • CORONARY ANGIOPLASTY WITH STENT PLACEMENT      EF = 10-15%.  Left main was normal, mild luminal irregularities of LAD, and 20% disease of the circumflex.  80% lesion in the prox RCA that was actually nondominant.  2.5 x 18mm Vision bare metal stent in the mid RCA.   • ELBOW PROCEDURE      REMOVAL OF NODULE ON LEFT ELBOW   • KNEE ACL RECONSTRUCTION      LEFT KNEE   • PACEMAKER IMPLANTATION     • PACEMAKER REPLACEMENT         Social History     Socioeconomic History   • Marital status: Single     Spouse name: Not on file   • Number of children: Not on file   • Years of education: Not on file   • Highest education level: Not on file   Social Needs   • Financial resource strain: Not on file   • Food insecurity - worry: Not on file   • Food insecurity - inability: Not on file   • Transportation needs - medical: Not on file   • Transportation needs - non-medical: Not on file   Occupational History   • Not on file   Tobacco  Use   • Smoking status: Never Smoker   • Smokeless tobacco: Never Used   • Tobacco comment: caffeine use   Substance and Sexual Activity   • Alcohol use: Yes     Comment: ocasional   • Drug use: No   • Sexual activity: Not on file   Other Topics Concern   • Not on file   Social History Narrative   • Not on file       Family History   Problem Relation Age of Onset   • Lung cancer Mother    • Stroke Father    • Heart attack Father    • Lung cancer Father    • Diabetes Brother    • Hyperlipidemia Brother    • Diabetes Brother    • Throat cancer Brother        Review of Systems   Constitution: Positive for malaise/fatigue. Negative for chills and fever.   Cardiovascular: Positive for dyspnea on exertion (sometimes). Negative for chest pain, leg swelling, near-syncope, orthopnea, palpitations, paroxysmal nocturnal dyspnea and syncope.   Respiratory: Negative for cough and shortness of breath.    Musculoskeletal: Negative for joint pain, joint swelling and myalgias.   Gastrointestinal: Negative for abdominal pain, diarrhea, melena, nausea and vomiting.   Genitourinary: Negative for frequency and hematuria.   Neurological: Positive for disturbances in coordination and excessive daytime sleepiness. Negative for light-headedness, numbness, paresthesias and seizures.   Allergic/Immunologic: Negative.    All other systems reviewed and are negative.      Allergies   Allergen Reactions   • Codeine Diarrhea     diarrhea   • Codeine Sulfate          Current Outpatient Medications:   •  aspirin 81 MG tablet, Take 1 tablet by mouth Daily., Disp: 30 tablet, Rfl: 11  •  atorvastatin (LIPITOR) 40 MG tablet, Take 1 tablet by mouth Daily. Take 1 tablet one day and 1/2 tablet the next day alternating 40 and 20 mg daily, Disp: 30 tablet, Rfl: 5  •  JERRY MICROLET LANCETS lancets, daily., Disp: , Rfl:   •  carvedilol (COREG) 6.25 MG tablet, Take 1 tablet by mouth 2 (Two) Times a Day. (Patient taking differently: Take 12.5 mg by mouth  "Daily.), Disp: 180 tablet, Rfl: 3  •  citalopram (CeleXA) 20 MG tablet, TAKE 1 TABLET BY MOUTH EVERYDAY. (Patient taking differently: TAKE 1 TABLET BY MOUTH prn), Disp: 30 tablet, Rfl: 4  •  furosemide (LASIX) 40 MG tablet, Take 1.5 tablets by mouth Daily., Disp: 45 tablet, Rfl: 5  •  Glucose Blood (JERRY CONTOUR NEXT TEST VI), 4 (four) times a day., Disp: , Rfl:   •  glucose blood (ONE TOUCH ULTRA TEST) test strip, TESTING BS 1 X DAY DX CODE E11.8, Disp: 100 each, Rfl: 1  •  HUMALOG KWIKPEN 100 UNIT/ML solution pen-injector, 15 units 3 times a day with meals, Disp: 46 mL, Rfl: 1  •  HUMALOG KWIKPEN 100 UNIT/ML solution pen-injector, INJECT 15 UNITS AS DIRECTED THREE TIMES DAILY WITH MEAL, Disp: 45 mL, Rfl: 0  •  insulin detemir (LEVEMIR FLEXTOUCH) 100 UNIT/ML injection, 30 units every morning and 26 units every evening, Disp: 54 mL, Rfl: 1  •  KLOR-CON 10 MEQ CR tablet, TAKE 1 TABLET BY MOUTH EVERY DAY, Disp: 90 tablet, Rfl: 3  •  metFORMIN (GLUCOPHAGE) 500 MG tablet, TAKE 1 TABLET BY MOUTH 2 (TWO) TIMES A DAY WITH MEALS., Disp: 180 tablet, Rfl: 1  •  Multiple Vitamins-Minerals (MULTI FOR HIM) capsule, Take 1 tablet/day by mouth daily., Disp: , Rfl:   •  nitroglycerin (NITROSTAT) 0.4 MG SL tablet, Place under the tongue., Disp: , Rfl:   •  pravastatin (PRAVACHOL) 20 MG tablet, Take 3 tablets by mouth Every Night., Disp: , Rfl:   •  rOPINIRole (REQUIP) 1 MG tablet, Take 1 tablet by mouth., Disp: , Rfl:   •  spironolactone (ALDACTONE) 25 MG tablet, TAKE 1 TABLET DAILY., Disp: 30 tablet, Rfl: 5  •  vitamin E ([employer] VITAMIN E) 400 UNIT capsule, Take  by mouth., Disp: , Rfl:       Objective:     Vitals:    01/28/19 1055   BP: 102/60   Pulse: 74   Weight: 93.9 kg (207 lb)   Height: 188 cm (74.02\")     Body mass index is 26.57 kg/m².    PHYSICAL EXAM:    Vitals Reviewed.   General Appearance: No acute distress, well developed and well nourished.   Eyes: Conjunctiva and lids: No erythema, swelling, or discharge. Sclera " non-icteric.   HENT: Atraumatic, normocephalic. External eyes, ears, and nose normal.   Respiratory: No signs of respiratory distress. Respiration rhythm and depth normal.   Decreased right base otherwise clear.   Cardiovascular:  Jugular Venous Pressure: Normal  Heart Rate and Rhythm: Normal, Heart Sounds: Normal S1 and S2, positive S 3  Murmurs: No murmurs noted. No rubs, thrills, or gallops.    Lower Extremities: Trace lower extremity edema.   Gastrointestinal:  Abdomen soft, non-distended, non-tender.   Musculoskeletal: Normal movement of extremities  Skin and Nails: General appearance normal. No pallor, cyanosis, diaphoresis. Skin temperature normal. No clubbing of fingernails.   Psychiatric: Patient alert and oriented, mood normal.       ECG 12 Lead  Date/Time: 2019 10:54 AM  Performed by: Angy Pineda APRN  Authorized by: Angy Pineda APRN   Comparison: compared with previous ECG from 10/22/2018  Similar to previous ECG  Rhythm: paced  Pacin% capture  Comments: Good CRT pacing              Assessment:       Diagnosis Plan   1. Ischemic cardiomyopathy  ECG 12 Lead    Sedimentation Rate    Ambulatory Referral to Pulmonology   2. S/P biventricular cardiac pacemaker procedure  ECG 12 Lead    Sedimentation Rate    Ambulatory Referral to Pulmonology   3. Coronary artery disease involving native coronary artery of native heart without angina pectoris  ECG 12 Lead   4. S/P angioplasty with stent  ECG 12 Lead   5. Sleep apnea, unspecified type  Ambulatory Referral to Pulmonology   6. Essential hypertension     7. Fatigue, unspecified type  Sedimentation Rate   8. Hot flashes  Sedimentation Rate          Plan:       1. -2. ICM, s/p CRT-P. Normal function and testing. He is not in HF by exam today. Last echo , was already ordered by PCP, will make sure it is scheduled and call with results when available.     3.-4. CAD, s/p stent. No chest pain, stable.    5. Sleep apnea, he has been intol to CPAP  in the past and was on nocturnal oxygen but has not been on it for the last 1-2 years due to just not getting it renewed. Question whether some of his fatigue and excessive daytime sleepiness is related to his untreated sleep apnea. He has seen Dr. STEPHEN Barnes in the past, will refer him back for further evaluation.     6. HTN, controlled.     7. Fatigue, labs from PCP were basically all normal. Will check echo and refer to pul for evaluation for his known sleep apnea.     8 Hot flashes, he denies fever with these, thyroid okay, will check a sed rate.     Dr. James and I both saw patient today. Do not think that his symptoms are related to his heart or changes with his heart but will see what echo shows, refer back to pul for his untreated sleep apnea and check a sed rate.     Follow up in 3 months.     As always, it has been a pleasure to participate in your patient's care.      Sincerely,         LAM Shaw

## 2019-02-04 ENCOUNTER — OFFICE VISIT (OUTPATIENT)
Dept: INTERNAL MEDICINE | Facility: CLINIC | Age: 69
End: 2019-02-04

## 2019-02-04 VITALS
OXYGEN SATURATION: 98 % | DIASTOLIC BLOOD PRESSURE: 50 MMHG | SYSTOLIC BLOOD PRESSURE: 90 MMHG | TEMPERATURE: 96.9 F | HEART RATE: 76 BPM | BODY MASS INDEX: 26.57 KG/M2 | WEIGHT: 207 LBS

## 2019-02-04 DIAGNOSIS — R23.2 ABNORMAL FLUSHING AND SWEATING: ICD-10-CM

## 2019-02-04 DIAGNOSIS — E11.42 TYPE 2 DIABETES MELLITUS WITH PERIPHERAL NEUROPATHY (HCC): ICD-10-CM

## 2019-02-04 DIAGNOSIS — I25.10 CORONARY ARTERY DISEASE INVOLVING NATIVE CORONARY ARTERY OF NATIVE HEART WITHOUT ANGINA PECTORIS: ICD-10-CM

## 2019-02-04 DIAGNOSIS — G47.19 EXCESSIVE DAYTIME SLEEPINESS: Primary | ICD-10-CM

## 2019-02-04 DIAGNOSIS — I42.0 DILATED CARDIOMYOPATHY (HCC): ICD-10-CM

## 2019-02-04 DIAGNOSIS — I77.9 RIGHT-SIDED CAROTID ARTERY DISEASE, UNSPECIFIED TYPE (HCC): ICD-10-CM

## 2019-02-04 DIAGNOSIS — G11.9 CEREBELLAR ATAXIA (HCC): ICD-10-CM

## 2019-02-04 DIAGNOSIS — I48.0 PAROXYSMAL ATRIAL FIBRILLATION (HCC): ICD-10-CM

## 2019-02-04 DIAGNOSIS — I10 ESSENTIAL HYPERTENSION: ICD-10-CM

## 2019-02-04 DIAGNOSIS — G47.30 SLEEP APNEA, UNSPECIFIED TYPE: ICD-10-CM

## 2019-02-04 DIAGNOSIS — E78.2 MIXED HYPERLIPIDEMIA: ICD-10-CM

## 2019-02-04 DIAGNOSIS — E80.6 HYPERBILIRUBINEMIA: ICD-10-CM

## 2019-02-04 DIAGNOSIS — R61 ABNORMAL FLUSHING AND SWEATING: ICD-10-CM

## 2019-02-04 DIAGNOSIS — I95.1 HYPOTENSION, POSTURAL: ICD-10-CM

## 2019-02-04 PROCEDURE — 99214 OFFICE O/P EST MOD 30 MIN: CPT | Performed by: FAMILY MEDICINE

## 2019-02-04 NOTE — PROGRESS NOTES
Subjective   Andreas Rubin is a 68 y.o. male.     Chief Complaint   Patient presents with   • Cardiomyopathy   Excessive daytime sleepiness falling asleep possible sleep apnea low blood pressure.      History of Present Illness   Don returns for recheck with his brother.  He doesn't complain of anything at all.  He has a significant cardiomyopathy on echo with a pending echo.  Previous CT of the head showed a chronic CVA and no change.  He cannot have an MRI because of his pacemaker.  He has a follow-up coming up with echocardiogram and also with sleep medicine.  He has had CPAP in the past and probably needs it again with a maximum humidification.  If also a number of times in the office just sitting in the chair.  He has fallen asleep in the bathroom as well.    He has had periods of excessive sweating and flushing and there is been a rise in bilirubin over the past 4 months on lab.  There is reported to be a gallbladder issue over 10 years ago.    Treatment of hypertension hyperlipidemia hypotension and cardiomyopathy are reviewed as well as pacemaker.      The following portions of the patient's history were reviewed and updated as appropriate: allergies, current medications, past social history and problem list.    Review of Systems   Constitutional: Negative.    HENT: Negative.    Eyes: Negative.    Respiratory: Negative.    Cardiovascular: Negative.    Gastrointestinal: Negative.    Endocrine: Negative.    Genitourinary: Negative.    Musculoskeletal: Negative.    Skin: Negative.    Allergic/Immunologic: Negative.    Neurological: Negative.    Hematological: Negative.    Psychiatric/Behavioral: Positive for decreased concentration and sleep disturbance.       Objective   Vitals:    02/04/19 0834   BP: 90/50   Pulse: 76   Temp: 96.9 °F (36.1 °C)   SpO2: 98%     Physical Exam   Constitutional: He is oriented to person, place, and time. He appears well-developed and well-nourished.   Sleeping at times    HENT:   Head: Normocephalic and atraumatic.   Right Ear: Tympanic membrane and external ear normal.   Left Ear: Tympanic membrane and external ear normal.   Nose: Nose normal.   Mouth/Throat: Oropharynx is clear and moist.   Eyes: Conjunctivae and EOM are normal. Pupils are equal, round, and reactive to light.   Neck: Normal range of motion. Neck supple. No JVD present. No thyromegaly present.   Cardiovascular: Normal rate, regular rhythm, normal heart sounds and intact distal pulses.   Pulmonary/Chest: Effort normal and breath sounds normal.   Abdominal: Soft. Bowel sounds are normal.   Musculoskeletal: Normal range of motion.   Lymphadenopathy:     He has no cervical adenopathy.   Neurological: He is alert and oriented to person, place, and time. No cranial nerve deficit. Coordination normal.   Skin: Skin is warm and dry. No rash noted.   Psychiatric: He has a normal mood and affect. His behavior is normal. Judgment and thought content normal.   Vitals reviewed.      Assessment/Plan   Problem List Items Addressed This Visit        Cardiovascular and Mediastinum    Hypotension, postural    Cardiomyopathy (CMS/HCC)    Paroxysmal atrial fibrillation (CMS/HCC)    Right-sided carotid artery disease (CMS/HCC)    Hyperlipidemia    Essential hypertension    Coronary artery disease involving native coronary artery of native heart without angina pectoris       Respiratory    Sleep apnea       Endocrine    Type 2 diabetes mellitus with peripheral neuropathy (CMS/HCC)       Nervous and Auditory    Cerebellar ataxia (CMS/HCC)      Other Visit Diagnoses     Excessive daytime sleepiness    -  Primary    Abnormal flushing and sweating        Relevant Orders    CT Abdomen Pelvis With Contrast    Hyperbilirubinemia        Relevant Orders    CT Abdomen Pelvis With Contrast      CT abdomen and pelvis with contrast.  Consider reducing carvedilol but we'll check with cardiology.  Pending echocardiogram pending sleep medicine study.   Return visit in 4 months.

## 2019-02-11 ENCOUNTER — OFFICE VISIT (OUTPATIENT)
Dept: SLEEP MEDICINE | Facility: HOSPITAL | Age: 69
End: 2019-02-11
Attending: INTERNAL MEDICINE

## 2019-02-11 VITALS
WEIGHT: 215.4 LBS | BODY MASS INDEX: 29.17 KG/M2 | OXYGEN SATURATION: 98 % | HEART RATE: 86 BPM | DIASTOLIC BLOOD PRESSURE: 89 MMHG | SYSTOLIC BLOOD PRESSURE: 125 MMHG | HEIGHT: 72 IN

## 2019-02-11 DIAGNOSIS — R09.02 HYPOXIA: ICD-10-CM

## 2019-02-11 DIAGNOSIS — G47.10 HYPERSOMNOLENCE: ICD-10-CM

## 2019-02-11 DIAGNOSIS — R06.00 DYSPNEA, UNSPECIFIED TYPE: ICD-10-CM

## 2019-02-11 DIAGNOSIS — G47.33 OSA (OBSTRUCTIVE SLEEP APNEA): Primary | ICD-10-CM

## 2019-02-11 PROCEDURE — G0463 HOSPITAL OUTPT CLINIC VISIT: HCPCS

## 2019-02-11 RX ORDER — ZOLPIDEM TARTRATE 5 MG/1
TABLET ORAL
Qty: 2 TABLET | Refills: 0 | Status: ON HOLD | OUTPATIENT
Start: 2019-02-11 | End: 2019-05-06

## 2019-02-11 NOTE — PROGRESS NOTES
[facility] SLEEP MEDICINE  [address] [facility]  [address] [place] [address]  [phone]    Referring Physician: Dr. James  PCP: Luis Dsouza Jr., MD    Reason for consult:  Sleep disorders of EDS    Andreas Rubin is a 68 y.o.male was seen in the [facility] today. He sleeps from 11p to 7a. Wakes up every hour due to nasal congestion and gasping respiration. He wakes up feeling okay on most days. He feels sleepy during the day, takes a nap upto 4 hours. Snores sometimes,  does not notice apneas during sleep. Has typical RLS symptoms on sleep onset. Also reports cramps during the night. Symptoms for past 1.5 yrs but much worse in past 1-2 months. He sweats excessively during sleep. He is on Requip qhs but does not take care of the RLS symptoms.  Near the end of the interview his  informed me that patient has had a previous sleep study.  Results are as detailed below.  Patient was intolerant of the CPAP machine at that time.  Also at some point in the past patient was on supplemental oxygen with sleep and during the day.  He has had difficulty getting his oxygen renewed.  He is pending an appointment with Dr. Kam Novoa in our office for follow-up.  He is initially scheduled appointment tomorrow has been changed by the patient due to schedule conflict.  [place] Sleepiness Score: 16. Caffeine intake 2-3 per day. Alcohol intake 1 per week.    Andreas Rubin  has a past medical history of Allergic rhinitis, ASHD (arteriosclerotic heart disease), Atrial fibrillation (CMS/HCC), AV block, Cardiomyopathy (CMS/HCC), Cerebellar stroke (CMS/HCC), CHF (congestive heart failure) (CMS/HCC), Depression, DM type 2 (diabetes mellitus, type 2) (CMS/HCC), Encounter for special screening examination for neoplasm of prostate, Hyperlipidemia, Hypertension, Hypotension, Imbalance, Intermittent claudication (CMS/HCC), and PALMER (obstructive sleep apnea). Psychiatric  problems    Current Medications:    Current Outpatient Medications:   •  aspirin 81 MG tablet, Take 1 tablet by mouth Daily., Disp: 30 tablet, Rfl: 11  •  atorvastatin (LIPITOR) 40 MG tablet, Take 1 tablet by mouth Daily. Take 1 tablet one day and 1/2 tablet the next day alternating 40 and 20 mg daily, Disp: 30 tablet, Rfl: 5  •  JERRY MICROLET LANCETS lancets, daily., Disp: , Rfl:   •  carvedilol (COREG) 6.25 MG tablet, Take 1 tablet by mouth 2 (Two) Times a Day. (Patient taking differently: Take 12.5 mg by mouth Daily.), Disp: 180 tablet, Rfl: 3  •  citalopram (CeleXA) 20 MG tablet, TAKE 1 TABLET BY MOUTH EVERYDAY. (Patient taking differently: TAKE 1 TABLET BY MOUTH prn), Disp: 30 tablet, Rfl: 4  •  furosemide (LASIX) 40 MG tablet, Take 1.5 tablets by mouth Daily., Disp: 45 tablet, Rfl: 5  •  Glucose Blood (JERRY CONTOUR NEXT TEST VI), 4 (four) times a day., Disp: , Rfl:   •  glucose blood (ONE TOUCH ULTRA TEST) test strip, TESTING BS 1 X DAY DX CODE E11.8, Disp: 100 each, Rfl: 1  •  HUMALOG KWIKPEN 100 UNIT/ML solution pen-injector, 15 units 3 times a day with meals, Disp: 46 mL, Rfl: 1  •  insulin detemir (LEVEMIR FLEXTOUCH) 100 UNIT/ML injection, 30 units every morning and 26 units every evening, Disp: 54 mL, Rfl: 1  •  KLOR-CON 10 MEQ CR tablet, TAKE 1 TABLET BY MOUTH EVERY DAY, Disp: 90 tablet, Rfl: 3  •  metFORMIN (GLUCOPHAGE) 500 MG tablet, TAKE 1 TABLET BY MOUTH 2 (TWO) TIMES A DAY WITH MEALS., Disp: 180 tablet, Rfl: 1  •  Multiple Vitamins-Minerals (MULTI FOR HIM) capsule, Take 1 tablet/day by mouth daily., Disp: , Rfl:   •  nitroglycerin (NITROSTAT) 0.4 MG SL tablet, Place under the tongue., Disp: , Rfl:   •  rOPINIRole (REQUIP) 1 MG tablet, Take 1 tablet by mouth., Disp: , Rfl:   •  spironolactone (ALDACTONE) 25 MG tablet, TAKE 1 TABLET DAILY., Disp: 30 tablet, Rfl: 5  •  vitamin E (CVS VITAMIN E) 400 UNIT capsule, Take  by mouth., Disp: , Rfl:   •  zolpidem (AMBIEN) 5 MG tablet, Bring to sleep lab DO  "NOT use at home. PLEASE take if not asleep within 30 mins of start of study., Disp: 2 tablet, Rfl: 0   also listed in Sleep Questionnaire.    FH: family history includes Diabetes in his brother and brother; Heart attack in his father; Hyperlipidemia in his brother; Lung cancer in his father and mother; Stroke in his father; Throat cancer in his brother.  SH:  reports that  has never smoked. he has never used smokeless tobacco. He reports that he drinks alcohol. He reports that he does not use drugs.    Pertinent Positive Review of Systems of frequent urination, nasal congestion, PDN, arthritis, poor appetite, irregular heartbeat, leg edema, cough, soa, dizziness, problems swallowing, diarrhea,excessive thirst, always too warm  Rest of Review of Systems was negative as recorded in Sleep Questionnaire.        Vital Signs: /89   Pulse 86   Ht 182.9 cm (72\")   Wt 97.7 kg (215 lb 6.4 oz)   SpO2 98%   BMI 29.21 kg/m²     Body mass index is 29.21 kg/m².       Tongue: normal      Dentition: upper and lower dentures       Pharynx: Posterior pharyngeal pillars are narrow   Mallampatti: II (hard and soft palate, upper portion of tonsils anduvula visible)        General: Alert. Cooperative. Well developed. No acute distress.             Head:  Normocephalic. Symmetrical. Atraumatic.              Eyes: Sclera clear. No icterus. PERRLA. Normal EOM.             Ears: No deformities. Normal hearing.             Nose: No septal deviation. No drainage.          Throat: No oral lesions. No thrush. Moist mucous membranes.    Chest Wall:  Normal shape. Symmetric expansion with respiration. No tenderness.             Neck:  Trachea midline.           Lungs:  Clear to auscultation bilaterally. No wheezes. No rhonchi. No rales. Respirations regular, even and unlabored.            Heart:  Regular rhythm and normal rate. Normal S1 and S2. No murmur.     Abdomen:  Soft, non-tender and non-distended. Normal bowel sounds. No " masses.  Extremities:  Moves all extremities well. No edema.           Pulses: Pulses palpable and equal bilaterally.               Skin: Dry. Intact. No bleeding. No rash.           Neuro: Moves all 4 extremities and cranial nerves grossly intact.  Psychiatric: Normal mood and affect.    Study:  3/24/2006   Clinical polysomnogram performed, total sleep time 357.5 minutes, 6  hours.  Latency to sleep onset normal at 10.3 minutes.  Latency to Stage REM  prolonged at 167 minutes, 6.6% of total sleep time demonstrated Stage REM.  Sleep efficiency, 88.5%.  The patient had two obstructive, three mixed  obstructive and 106 partial obstructive events.  15 central apneic events also  observed. Apnea/hypopnea index for total sleep time moderately abnormal at  21.1 per hour.  During REM, 40.9 per hour. Lowest O2 saturation 89%.  Percent  time snoring, 15.2  No significant heart rate variability noted. However, PACs  observed.  The patient had 122 arousals noted, 83 of those associated with  respiratory events. The patient did have an abnormal periodic limb movement  index of 22.5 per hour but the patient's isolated and periodic limb movement  with arousal indices were normal.   4/14/2006  Positive airway pressure polysomnogram was performed.  Total sleep  time 320.5 minutes, 5.3 hours.  Latency to sleep onset short at 5 minutes  demonstrating hypersomnolence.  Latency to Stage REM also short at 10 minutes  and 28.7% of total sleep times demonstrated Stage REM.  Sleep efficiency good  at 91.5%. The patient did have improvement in the number of obstructive events  with positive airway pressure therapy.  However, the patient did have the  emergence of central sleep apnea with 55 events noted and a central sleep  apnea index of 10.3 per hour.  Improvement in hypoxemia also noted.  69% of  total sleep time demonstrated bradycardia.  PACs were noted.  Abnormal  periodic limb movement index of 28.6 per hour noted however, the  patient's  isolated and periodic limb movement with arousal indices were normal.   C-PAP titrated between 5 and 13 cm of water pressure.     Impression:  1. PALMER (obstructive sleep apnea)    2. Hypersomnolence    3. Dyspnea, unspecified type    4. Hypoxia        Plan:  Andreas had evidence of obstructive sleep apnea back in 2006.  He likely has persistence of sleep disordered breathing and needs treatment with a CPAP machine.  Because he has dentures there are no other good options for treatment of the sleep disordered breathing.  I have therefore ordered an in lab polysomnogram study.  This patient has psychiatric issues and will not be able to do a home sleep test.  Ambien was ordered to help him sleep in the sleep lab.  Instructions were given to the patient and to the  regarding use of Ambien only in the sleep lab and only if he is unable to sleep.  Patient has cardiomyopathy and other cardiac issues which make treatment of sleep disordered breathing extremely important.  This was emphasized.     reports that patient has dyspnea and hypoxia.  He has previously seen Dr. Kam Novoa in our office and has plans to do so in the near future.  He will continue follow-up with [facility]  For the same.    Patient has significant nasal congestion.  This has limited use of CPAP in the past.  I advised him to use Flonase nightly every night to help with decongestion of the sinuses and to allow adequate treatment with CPAP device.  He may need evaluation by ENT in the future.    This patient has typical features of obstructive sleep apnea with hypersomnolence snoring and apneas along with elevated BMI and classic oropharyngeal structure.  Likelihood of obstructive sleep apnea is high and a polysomnogram study will therefore be requested.      Possible diagnosis and pathophysiology of obstructive sleep apnea was discussed with the patient.  Health risks of untreated obstructive sleep apnea  including cardiovascular risks were discussed.  Patient was cautioned about activities requiring full concentration especially driving at night or for longer distances, and until hypersomnolence is corrected.    Results of sleep testing will be reviewed to see if patient is a candidate for CPAP machine.  Alternatives to therapy include oral mandibular advancing device (OMAD) were discussed. However OMAD is usually only beneficial in mild obstructive sleep apnea.  The benefit of weight loss in reducing severity of obstructive sleep apnea was discussed.  Patient would benefit from adhering to a strict diet to achieve ideal BMI.     Patient will follow up in this clinic after study    Thank you for allowing me to participate in your patient's care.    Allen Delaney MD    Part of this note may be an electronic transcription/translation of spoken language to printed text using the Dragon Dictation System.

## 2019-02-13 ENCOUNTER — HOSPITAL ENCOUNTER (OUTPATIENT)
Dept: CT IMAGING | Facility: HOSPITAL | Age: 69
Discharge: HOME | End: 2019-02-13
Admitting: FAMILY MEDICINE

## 2019-02-13 DIAGNOSIS — R23.2 ABNORMAL FLUSHING AND SWEATING: ICD-10-CM

## 2019-02-13 DIAGNOSIS — R61 ABNORMAL FLUSHING AND SWEATING: ICD-10-CM

## 2019-02-13 DIAGNOSIS — E80.6 HYPERBILIRUBINEMIA: ICD-10-CM

## 2019-02-13 LAB — CREAT BLDA-MCNC: 0.8 MG/DL (ref 0.6–1.3)

## 2019-02-13 PROCEDURE — 25010000002 IOPAMIDOL 61 % SOLUTION: Performed by: FAMILY MEDICINE

## 2019-02-13 PROCEDURE — 74177 CT ABD & PELVIS W/CONTRAST: CPT

## 2019-02-13 PROCEDURE — 82565 ASSAY OF CREATININE: CPT

## 2019-02-13 RX ADMIN — IOPAMIDOL 85 ML: 612 INJECTION, SOLUTION INTRAVENOUS at 10:30

## 2019-02-15 ENCOUNTER — TELEPHONE (OUTPATIENT)
Dept: INTERNAL MEDICINE | Facility: CLINIC | Age: 69
End: 2019-02-15

## 2019-02-18 DIAGNOSIS — I10 ESSENTIAL HYPERTENSION: ICD-10-CM

## 2019-02-18 NOTE — TELEPHONE ENCOUNTER
I talked to his brother about the test and he is not complaining of anything but never does.  He will have ECHO and sleep study and if still not eating well then we could do a HIDA scan later.

## 2019-02-19 RX ORDER — FUROSEMIDE 40 MG/1
TABLET ORAL
Qty: 30 TABLET | Refills: 1 | Status: SHIPPED | OUTPATIENT
Start: 2019-02-19 | End: 2019-04-11 | Stop reason: SDUPTHER

## 2019-02-19 RX ORDER — FUROSEMIDE 20 MG/1
TABLET ORAL
Qty: 135 TABLET | Refills: 1 | OUTPATIENT
Start: 2019-02-19

## 2019-02-21 ENCOUNTER — APPOINTMENT (OUTPATIENT)
Dept: CARDIOLOGY | Facility: HOSPITAL | Age: 69
End: 2019-02-21

## 2019-02-21 ENCOUNTER — TELEPHONE (OUTPATIENT)
Dept: INTERNAL MEDICINE | Facility: CLINIC | Age: 69
End: 2019-02-21

## 2019-02-21 NOTE — TELEPHONE ENCOUNTER
Isabel at [facility] called and said that the pt's echo is not covered with the dx of hot flashes. They need a different Dx in order to reschedule and get this procedure covered by insurance. Please advise

## 2019-02-22 ENCOUNTER — HOSPITAL ENCOUNTER (OUTPATIENT)
Dept: SLEEP MEDICINE | Facility: HOSPITAL | Age: 69
Discharge: HOME | End: 2019-02-22
Admitting: INTERNAL MEDICINE

## 2019-02-22 DIAGNOSIS — G47.33 OSA (OBSTRUCTIVE SLEEP APNEA): ICD-10-CM

## 2019-02-22 PROCEDURE — 95811 POLYSOM 6/>YRS CPAP 4/> PARM: CPT

## 2019-02-26 ENCOUNTER — TELEPHONE (OUTPATIENT)
Dept: SLEEP MEDICINE | Facility: HOSPITAL | Age: 69
End: 2019-02-26

## 2019-02-26 NOTE — TELEPHONE ENCOUNTER
Tech called pts home #, patient answered. Tech went over in lab study results 's impression and treatment plan. Patient verbalized his understanding. Patient confirmed DME of [facility] and scheduled F/U appt as well. MICHAEL

## 2019-03-11 ENCOUNTER — TELEPHONE (OUTPATIENT)
Dept: SLEEP MEDICINE | Facility: HOSPITAL | Age: 69
End: 2019-03-11

## 2019-03-11 NOTE — TELEPHONE ENCOUNTER
Pt's brother and POA called today to request switching DME companies from [facility] to [facility].  All paperwork pulled for set up and sent to [facility] per pt request. Per pt report, could not get in touch with [facility] despite repeated calls. brandeno

## 2019-03-12 ENCOUNTER — HOSPITAL ENCOUNTER (OUTPATIENT)
Dept: CARDIOLOGY | Facility: HOSPITAL | Age: 69
Discharge: HOME | End: 2019-03-12
Admitting: NURSE PRACTITIONER

## 2019-03-12 VITALS
HEART RATE: 91 BPM | SYSTOLIC BLOOD PRESSURE: 100 MMHG | HEIGHT: 71 IN | WEIGHT: 215 LBS | BODY MASS INDEX: 30.1 KG/M2 | DIASTOLIC BLOOD PRESSURE: 60 MMHG

## 2019-03-12 DIAGNOSIS — I48.0 PAROXYSMAL ATRIAL FIBRILLATION (HCC): ICD-10-CM

## 2019-03-12 DIAGNOSIS — I42.0 DILATED CARDIOMYOPATHY (HCC): ICD-10-CM

## 2019-03-12 DIAGNOSIS — R23.2 HOT FLASHES: ICD-10-CM

## 2019-03-12 DIAGNOSIS — R53.83 FATIGUE, UNSPECIFIED TYPE: ICD-10-CM

## 2019-03-12 DIAGNOSIS — F32.A DEPRESSION: ICD-10-CM

## 2019-03-12 PROCEDURE — 25010000002 PERFLUTREN (DEFINITY) 8.476 MG IN SODIUM CHLORIDE 0.9 % 10 ML INJECTION: Performed by: NURSE PRACTITIONER

## 2019-03-12 PROCEDURE — 93306 TTE W/DOPPLER COMPLETE: CPT | Performed by: INTERNAL MEDICINE

## 2019-03-12 PROCEDURE — 93306 TTE W/DOPPLER COMPLETE: CPT

## 2019-03-12 RX ADMIN — PERFLUTREN 1.5 ML: 6.52 INJECTION, SUSPENSION INTRAVENOUS at 12:36

## 2019-03-13 LAB
ASCENDING AORTA: 3.2 CM
BH CV ECHO MEAS - ACS: 1.6 CM
BH CV ECHO MEAS - AO MAX PG (FULL): 4.6 MMHG
BH CV ECHO MEAS - AO MAX PG: 6 MMHG
BH CV ECHO MEAS - AO MEAN PG (FULL): 2.9 MMHG
BH CV ECHO MEAS - AO MEAN PG: 3.6 MMHG
BH CV ECHO MEAS - AO ROOT AREA (BSA CORRECTED): 1.5
BH CV ECHO MEAS - AO ROOT AREA: 8.7 CM^2
BH CV ECHO MEAS - AO ROOT DIAM: 3.3 CM
BH CV ECHO MEAS - AO V2 MAX: 122.8 CM/SEC
BH CV ECHO MEAS - AO V2 MEAN: 89.2 CM/SEC
BH CV ECHO MEAS - AO V2 VTI: 17.3 CM
BH CV ECHO MEAS - AVA(I,A): 1.6 CM^2
BH CV ECHO MEAS - AVA(I,D): 1.6 CM^2
BH CV ECHO MEAS - AVA(V,A): 1.7 CM^2
BH CV ECHO MEAS - AVA(V,D): 1.7 CM^2
BH CV ECHO MEAS - BSA(HAYCOCK): 2.2 M^2
BH CV ECHO MEAS - BSA: 2.2 M^2
BH CV ECHO MEAS - BZI_BMI: 29.2 KILOGRAMS/M^2
BH CV ECHO MEAS - BZI_METRIC_HEIGHT: 182.9 CM
BH CV ECHO MEAS - BZI_METRIC_WEIGHT: 97.5 KG
BH CV ECHO MEAS - EDV(MOD-SP2): 292 ML
BH CV ECHO MEAS - EDV(MOD-SP4): 217 ML
BH CV ECHO MEAS - EDV(TEICH): 275.5 ML
BH CV ECHO MEAS - EF(CUBED): 39.5 %
BH CV ECHO MEAS - EF(MOD-BP): 16 %
BH CV ECHO MEAS - EF(MOD-SP2): 14 %
BH CV ECHO MEAS - EF(MOD-SP4): 17.1 %
BH CV ECHO MEAS - EF(TEICH): 31.6 %
BH CV ECHO MEAS - ESV(MOD-SP2): 251 ML
BH CV ECHO MEAS - ESV(MOD-SP4): 180 ML
BH CV ECHO MEAS - ESV(TEICH): 188.4 ML
BH CV ECHO MEAS - FS: 15.4 %
BH CV ECHO MEAS - IVS/LVPW: 0.8
BH CV ECHO MEAS - IVSD: 0.97 CM
BH CV ECHO MEAS - LAT PEAK E' VEL: 5 CM/SEC
BH CV ECHO MEAS - LV DIASTOLIC VOL/BSA (35-75): 98.8 ML/M^2
BH CV ECHO MEAS - LV MASS(C)D: 381.6 GRAMS
BH CV ECHO MEAS - LV MASS(C)DI: 173.7 GRAMS/M^2
BH CV ECHO MEAS - LV MAX PG: 1.4 MMHG
BH CV ECHO MEAS - LV MEAN PG: 0.7 MMHG
BH CV ECHO MEAS - LV SYSTOLIC VOL/BSA (12-30): 81.9 ML/M^2
BH CV ECHO MEAS - LV V1 MAX: 59.2 CM/SEC
BH CV ECHO MEAS - LV V1 MEAN: 38.1 CM/SEC
BH CV ECHO MEAS - LV V1 VTI: 8.1 CM
BH CV ECHO MEAS - LVIDD: 7.2 CM
BH CV ECHO MEAS - LVIDS: 6.1 CM
BH CV ECHO MEAS - LVLD AP2: 9.7 CM
BH CV ECHO MEAS - LVLD AP4: 8.7 CM
BH CV ECHO MEAS - LVLS AP2: 8.7 CM
BH CV ECHO MEAS - LVLS AP4: 8.2 CM
BH CV ECHO MEAS - LVOT AREA (M): 3.5 CM^2
BH CV ECHO MEAS - LVOT AREA: 3.5 CM^2
BH CV ECHO MEAS - LVOT DIAM: 2.1 CM
BH CV ECHO MEAS - LVPWD: 1.2 CM
BH CV ECHO MEAS - MED PEAK E' VEL: 4 CM/SEC
BH CV ECHO MEAS - MR MAX PG: 104.2 MMHG
BH CV ECHO MEAS - MR MAX VEL: 510.4 CM/SEC
BH CV ECHO MEAS - MV DEC SLOPE: 408.2 CM/SEC^2
BH CV ECHO MEAS - MV DEC TIME: 0.19 SEC
BH CV ECHO MEAS - MV E MAX VEL: 81.7 CM/SEC
BH CV ECHO MEAS - MV MAX PG: 4.7 MMHG
BH CV ECHO MEAS - MV MEAN PG: 1.5 MMHG
BH CV ECHO MEAS - MV P1/2T MAX VEL: 80 CM/SEC
BH CV ECHO MEAS - MV P1/2T: 57.4 MSEC
BH CV ECHO MEAS - MV V2 MAX: 108.6 CM/SEC
BH CV ECHO MEAS - MV V2 MEAN: 55.1 CM/SEC
BH CV ECHO MEAS - MV V2 VTI: 22.3 CM
BH CV ECHO MEAS - MVA P1/2T LCG: 2.7 CM^2
BH CV ECHO MEAS - MVA(P1/2T): 3.8 CM^2
BH CV ECHO MEAS - MVA(VTI): 1.3 CM^2
BH CV ECHO MEAS - PA ACC TIME: 0.13 SEC
BH CV ECHO MEAS - PA MAX PG (FULL): 0.73 MMHG
BH CV ECHO MEAS - PA MAX PG: 1.3 MMHG
BH CV ECHO MEAS - PA PR(ACCEL): 20.4 MMHG
BH CV ECHO MEAS - PA V2 MAX: 56.1 CM/SEC
BH CV ECHO MEAS - PVA(V,A): 2.6 CM^2
BH CV ECHO MEAS - PVA(V,D): 2.6 CM^2
BH CV ECHO MEAS - QP/QS: 1.1
BH CV ECHO MEAS - RAP SYSTOLE: 15 MMHG
BH CV ECHO MEAS - RV MAX PG: 0.53 MMHG
BH CV ECHO MEAS - RV MEAN PG: 0.31 MMHG
BH CV ECHO MEAS - RV V1 MAX: 36.4 CM/SEC
BH CV ECHO MEAS - RV V1 MEAN: 26.6 CM/SEC
BH CV ECHO MEAS - RV V1 VTI: 7.5 CM
BH CV ECHO MEAS - RVOT AREA: 4 CM^2
BH CV ECHO MEAS - RVOT DIAM: 2.2 CM
BH CV ECHO MEAS - RVSP: 43 MMHG
BH CV ECHO MEAS - SI(AO): 68.1 ML/M^2
BH CV ECHO MEAS - SI(CUBED): 68.2 ML/M^2
BH CV ECHO MEAS - SI(LVOT): 12.7 ML/M^2
BH CV ECHO MEAS - SI(MOD-SP2): 18.7 ML/M^2
BH CV ECHO MEAS - SI(MOD-SP4): 16.8 ML/M^2
BH CV ECHO MEAS - SI(TEICH): 39.6 ML/M^2
BH CV ECHO MEAS - SUP REN AO DIAM: 2 CM
BH CV ECHO MEAS - SV(AO): 149.5 ML
BH CV ECHO MEAS - SV(CUBED): 149.9 ML
BH CV ECHO MEAS - SV(LVOT): 27.9 ML
BH CV ECHO MEAS - SV(MOD-SP2): 41 ML
BH CV ECHO MEAS - SV(MOD-SP4): 37 ML
BH CV ECHO MEAS - SV(RVOT): 29.7 ML
BH CV ECHO MEAS - SV(TEICH): 87 ML
BH CV ECHO MEAS - TAPSE (>1.6): 2.1 CM2
BH CV ECHO MEAS - TR MAX VEL: 263.9 CM/SEC
BH CV ECHO MEASUREMENTS AVERAGE E/E' RATIO: 18.16
BH CV XLRA - RV BASE: 5.3 CM
BH CV XLRA - TDI S': 11 CM/SEC
LEFT ATRIUM VOLUME INDEX: 58 ML/M2
LV EF 2D ECHO EST: 16 %
MAXIMAL PREDICTED HEART RATE: 153 BPM
SINUS: 3.7 CM
STJ: 3.1 CM
STRESS TARGET HR: 130 BPM

## 2019-03-13 RX ORDER — CITALOPRAM 20 MG/1
TABLET ORAL
Qty: 90 TABLET | Refills: 1 | Status: SHIPPED | OUTPATIENT
Start: 2019-03-13 | End: 2019-06-10 | Stop reason: SDUPTHER

## 2019-03-13 RX ORDER — POTASSIUM CHLORIDE 750 MG/1
TABLET, FILM COATED, EXTENDED RELEASE ORAL
Qty: 90 TABLET | Refills: 0 | Status: SHIPPED | OUTPATIENT
Start: 2019-03-13 | End: 2019-06-10 | Stop reason: SDUPTHER

## 2019-03-14 ENCOUNTER — TELEPHONE (OUTPATIENT)
Dept: INTERNAL MEDICINE | Facility: CLINIC | Age: 69
End: 2019-03-14

## 2019-03-14 NOTE — TELEPHONE ENCOUNTER
Spoke with patient's brother, Nino on the phone.  Discussed to the results of his recent echocardiogram which showed an ejection fraction of 15%, down from 28% at his last echo in 2015.  The patient is still experiencing the fatigue and intermittent hot flashes that he was having when I saw him back in January.  He is having some minimal increase in bilateral lower extremity edema.  I am reaching out to LAM Swanson with full cardiology, who saw the patient in January 2019, to discuss this echo and where to proceed from here.  I will give Nino a call back after this discussion with further recommendations and possibly a follow-up with myself or cardiology.

## 2019-03-19 ENCOUNTER — TELEPHONE (OUTPATIENT)
Dept: CARDIOLOGY | Facility: CLINIC | Age: 69
End: 2019-03-19

## 2019-03-19 ENCOUNTER — TELEPHONE (OUTPATIENT)
Dept: INTERNAL MEDICINE | Facility: CLINIC | Age: 69
End: 2019-03-19

## 2019-03-19 NOTE — TELEPHONE ENCOUNTER
----- Message from LAM Palma sent at 3/13/2019 10:17 AM EDT -----  Regarding: ECHO results  Good morning,     I saw a mutual patient, Andreas Rubin, back in January. At that time he was endorsing severe hot flashes and fatigue. I ordered an ECHO which he just had completed. It looks like his previous one was in 2015. It looks like his EF is down to 16%. Would you mind taking a look at the ECHO results and letting me know how you would like to proceed? Would you like to see him in the office sooner than his next f/u? It looks like he is scheduled to see you on 5/3/19.     Thank you!     LAM Palma

## 2019-03-19 NOTE — TELEPHONE ENCOUNTER
Samia    Can you call them and let them know that Dr. James reviewed echo images and thought it looked about the same as the one in 2015 so do not think anything needs to be changed at this time. Will see him at scheduled appt in May.

## 2019-03-19 NOTE — TELEPHONE ENCOUNTER
Pt's brother called and wanted to know if you had heard from his cardiologist yesterday? Please advise.

## 2019-03-19 NOTE — TELEPHONE ENCOUNTER
Please let them know that I haven't heard back from cardiology yet, but that if I don't hear back by the end of today I will reach out to them again.

## 2019-03-20 ENCOUNTER — TELEPHONE (OUTPATIENT)
Dept: INTERNAL MEDICINE | Facility: CLINIC | Age: 69
End: 2019-03-20

## 2019-03-20 NOTE — TELEPHONE ENCOUNTER
Received a message from the cardiology office this morning.  Dr. Judge has looked over the echocardiogram results and does not want to make any changes at this time, but will follow up with the patient at his May 2019 appointment.  I called patient's brother Nino and informed him of this.  He acknowledged understanding.  He states that the patient has had a sleep study, and is going to be set up with a CPAP machine next week.  I believe this may solve some of the fatigue issues and the inappropriate sleeping issues that he has been having.  He will follow-up with me as needed and routinely with PCP, Dr. sandhu and cardiologist, Dr. James and LAM Swanson.

## 2019-04-11 DIAGNOSIS — I10 ESSENTIAL HYPERTENSION: ICD-10-CM

## 2019-04-11 RX ORDER — FUROSEMIDE 40 MG/1
TABLET ORAL
Qty: 30 TABLET | Refills: 1 | Status: SHIPPED | OUTPATIENT
Start: 2019-04-11 | End: 2019-05-16 | Stop reason: SDUPTHER

## 2019-04-11 RX ORDER — ATORVASTATIN CALCIUM 40 MG/1
TABLET, FILM COATED ORAL
Qty: 30 TABLET | Refills: 3 | Status: SHIPPED | OUTPATIENT
Start: 2019-04-11

## 2019-04-21 DIAGNOSIS — Z79.4 TYPE 2 DIABETES MELLITUS WITH COMPLICATION, WITH LONG-TERM CURRENT USE OF INSULIN (HCC): ICD-10-CM

## 2019-04-21 DIAGNOSIS — E11.8 TYPE 2 DIABETES MELLITUS WITH COMPLICATION, WITH LONG-TERM CURRENT USE OF INSULIN (HCC): ICD-10-CM

## 2019-04-30 ENCOUNTER — CLINICAL SUPPORT NO REQUIREMENTS (OUTPATIENT)
Dept: CARDIOLOGY | Facility: CLINIC | Age: 69
End: 2019-04-30

## 2019-04-30 DIAGNOSIS — I25.5 ISCHEMIC CARDIOMYOPATHY: Primary | ICD-10-CM

## 2019-05-03 ENCOUNTER — OFFICE VISIT (OUTPATIENT)
Dept: CARDIOLOGY | Facility: CLINIC | Age: 69
End: 2019-05-03

## 2019-05-03 ENCOUNTER — CLINICAL SUPPORT NO REQUIREMENTS (OUTPATIENT)
Dept: CARDIOLOGY | Facility: CLINIC | Age: 69
End: 2019-05-03

## 2019-05-03 VITALS
HEART RATE: 75 BPM | HEIGHT: 71 IN | DIASTOLIC BLOOD PRESSURE: 88 MMHG | BODY MASS INDEX: 29.68 KG/M2 | SYSTOLIC BLOOD PRESSURE: 112 MMHG | WEIGHT: 212 LBS

## 2019-05-03 DIAGNOSIS — I25.10 CORONARY ARTERY DISEASE INVOLVING NATIVE CORONARY ARTERY OF NATIVE HEART WITHOUT ANGINA PECTORIS: ICD-10-CM

## 2019-05-03 DIAGNOSIS — G47.30 SLEEP APNEA, UNSPECIFIED TYPE: ICD-10-CM

## 2019-05-03 DIAGNOSIS — I25.5 ISCHEMIC CARDIOMYOPATHY: Primary | ICD-10-CM

## 2019-05-03 DIAGNOSIS — Z95.0 S/P BIVENTRICULAR CARDIAC PACEMAKER PROCEDURE: ICD-10-CM

## 2019-05-03 DIAGNOSIS — I50.9 CONGESTIVE HEART FAILURE, UNSPECIFIED HF CHRONICITY, UNSPECIFIED HEART FAILURE TYPE (HCC): Primary | ICD-10-CM

## 2019-05-03 DIAGNOSIS — I10 ESSENTIAL HYPERTENSION: ICD-10-CM

## 2019-05-03 PROCEDURE — 99213 OFFICE O/P EST LOW 20 MIN: CPT | Performed by: NURSE PRACTITIONER

## 2019-05-03 PROCEDURE — 93290 INTERROG DEV EVAL ICPMS IP: CPT | Performed by: INTERNAL MEDICINE

## 2019-05-03 PROCEDURE — 93281 PM DEVICE PROGR EVAL MULTI: CPT | Performed by: INTERNAL MEDICINE

## 2019-05-03 PROCEDURE — 93000 ELECTROCARDIOGRAM COMPLETE: CPT | Performed by: NURSE PRACTITIONER

## 2019-05-03 NOTE — PROGRESS NOTES
Date of Office Visit: 2019  Encounter Provider: LAM Brown  Place of Service: [facility] CARDIOLOGY  Patient Name: Andreas Rubin  :1950    Chief Complaint   Patient presents with   • Pacemaker Check   • Cardiomyopathy   :     HPI: Andreas Rubin is a 68 y.o. male who is a patient of Dr. James's history of cardiomyopathy, status post CRT-P, paroxysmal atrial fib (no anticoagulation due to falls and no AF on device), CAD, s/p stent (), intellectual disability, cerebellar ataxia and stroke and PALMER (recently started back on CPAP).     I saw him in January, at that time his brother (who is his caregiver) stated that he was fatigued and sleeping all the time. He was re-evaluated by sleep medicine and is now using a new CPAP machine.    Today he and his brother report that he is doing better as far as the fatigue and sleeping less. He thinks the new CPAP is help. He denies chest pain or shortness of breath but is having some intermittent increased swelling of lower extremities. He has tried to get him to take some extra diuretic but he really has not done that.     Device interrogation shows normal function and testing. A paced 94%, Biv paced 97%. No arrhythmia episodes. Optivol remains elevated.     He did have an echo in March which was really unchanged from prior echos.     Past Medical History:   Diagnosis Date   • Allergic rhinitis    • ASHD (arteriosclerotic heart disease)    • Atrial fibrillation (CMS/HCC)    • AV block    • Cardiomyopathy (CMS/HCC)    • Cerebellar stroke (CMS/HCC)    • CHF (congestive heart failure) (CMS/HCC)    • Coronary artery disease    • Depression    • DM type 2 (diabetes mellitus, type 2) (CMS/HCC)    • Encounter for special screening examination for neoplasm of prostate    • Fatigue    • Hot flashes    • Hyperlipidemia    • Hypertension    • Hypotension    • Imbalance    • Intermittent claudication (CMS/HCC)    • Ischemic  cardiomyopathy    • PALMER (obstructive sleep apnea)    • S/P angioplasty with stent    • S/P biventricular cardiac pacemaker procedure    • Sleep apnea        Past Surgical History:   Procedure Laterality Date   • BUNIONECTOMY      BILATERAL FEET   • CARDIAC CATHETERIZATION Left 06/16/2011    [facility], Dr. Walt Saab, coronaries and LV gram   • CARDIOVERSION  [facility]/20/[facility] Dr. James, cardioverison of atrial flutter to sinus rhythm, reprogramming of pacemaker   • CAROTID STENT      EF=10-15%LEFT MAIN NORMAL , MILD LUMINAL IRR OF LED AND 20% DISEASE OF THE CIRCUMFLEX 80% LESIONIN THE PROX RCA THAT WAS ACTUALLY NONDOMINANT 2.5 X 18 MM VISION BARE METAL STENT IN THE MID RCA    • CATARACT EXTRACTION Bilateral    • COLONOSCOPY N/A 3/3/2017    Procedure: COLONOSCOPY TO CECUM;  Surgeon: Benton Castellanos MD;  Location: [facility] ENDOSCOPY;  Service:    • CORONARY ANGIOPLASTY WITH STENT PLACEMENT  06/16/2011    EF = 10-15%.  Left main was normal, mild luminal irregularities of LAD, and 20% disease of the circumflex.  80% lesion in the prox RCA that was actually nondominant.  2.5 x 18mm Vision bare metal stent in the mid RCA.   • ELBOW PROCEDURE      REMOVAL OF NODULE ON LEFT ELBOW   • KNEE ACL RECONSTRUCTION      LEFT KNEE   • [facility]   • PACEMAKER REPLACEMENT  11/12/2015       Social History     Socioeconomic History   • Marital status: Single     Spouse name: Not on file   • Number of children: Not on file   • Years of education: Not on file   • Highest education level: Not on file   Tobacco Use   • Smoking status: Never Smoker   • Smokeless tobacco: Never Used   • Tobacco comment: caffeine use   Substance and Sexual Activity   • Alcohol use: Yes     Comment: ocasional   • Drug use: No       Family History   Problem Relation Age of Onset   • Lung cancer Mother    • Stroke Father    • Heart attack Father    •  Lung cancer Father    • Diabetes Brother    • Hyperlipidemia Brother    • Diabetes Brother    • Throat cancer Brother        Review of Systems   Constitution: Positive for malaise/fatigue. Negative for chills and fever.   Cardiovascular: Positive for leg swelling. Negative for chest pain, dyspnea on exertion, near-syncope, orthopnea, palpitations, paroxysmal nocturnal dyspnea and syncope.   Respiratory: Negative for cough and shortness of breath.    Musculoskeletal: Negative for joint pain, joint swelling and myalgias.   Gastrointestinal: Negative for abdominal pain, diarrhea, melena, nausea and vomiting.   Genitourinary: Negative for frequency and hematuria.   Neurological: Negative for light-headedness, numbness, paresthesias and seizures.   Allergic/Immunologic: Negative.    All other systems reviewed and are negative.      Allergies   Allergen Reactions   • Codeine Diarrhea     diarrhea   • Codeine Sulfate          Current Outpatient Medications:   •  aspirin 81 MG tablet, Take 1 tablet by mouth Daily., Disp: 30 tablet, Rfl: 11  •  atorvastatin (LIPITOR) 40 MG tablet, ALTERNATE 1 TABLET AND 1/2 TABLET DAILY, Disp: 30 tablet, Rfl: 3  •  JERRY MICROLET LANCETS lancets, daily., Disp: , Rfl:   •  carvedilol (COREG) 6.25 MG tablet, Take 1 tablet by mouth 2 (Two) Times a Day. (Patient taking differently: Take 12.5 mg by mouth Daily.), Disp: 180 tablet, Rfl: 3  •  citalopram (CeleXA) 20 MG tablet, TAKE 1 TABLET BY MOUTH EVERYDAY., Disp: 90 tablet, Rfl: 1  •  furosemide (LASIX) 40 MG tablet, TAKE 1 TABLET BY MOUTH EVERY DAY, Disp: 30 tablet, Rfl: 1  •  Glucose Blood (JERRY CONTOUR NEXT TEST VI), 4 (four) times a day., Disp: , Rfl:   •  glucose blood (ONE TOUCH ULTRA TEST) test strip, TESTING BS 1 X DAY DX CODE E11.8, Disp: 100 each, Rfl: 1  •  HUMALOG KWIKPEN 100 UNIT/ML solution pen-injector, 15 units 3 times a day with meals, Disp: 46 mL, Rfl: 1  •  insulin detemir (LEVEMIR FLEXTOUCH) 100 UNIT/ML injection, 30 units  "every morning and 26 units every evening, Disp: 54 mL, Rfl: 1  •  KLOR-CON 10 MEQ CR tablet, TAKE 1 TABLET BY MOUTH EVERY DAY, Disp: 90 tablet, Rfl: 0  •  metFORMIN (GLUCOPHAGE) 500 MG tablet, TAKE 1 TABLET BY MOUTH TWICE A DAY WITH MEALS, Disp: 180 tablet, Rfl: 1  •  Multiple Vitamins-Minerals (MULTI FOR HIM) capsule, Take 1 tablet/day by mouth daily., Disp: , Rfl:   •  nitroglycerin (NITROSTAT) 0.4 MG SL tablet, Place under the tongue., Disp: , Rfl:   •  rOPINIRole (REQUIP) 1 MG tablet, Take 1 tablet by mouth., Disp: , Rfl:   •  spironolactone (ALDACTONE) 25 MG tablet, TAKE 1 TABLET DAILY., Disp: 30 tablet, Rfl: 5  •  vitamin E (CVS VITAMIN E) 400 UNIT capsule, Take  by mouth., Disp: , Rfl:   •  zolpidem (AMBIEN) 5 MG tablet, Bring to sleep lab DO NOT use at home. PLEASE take if not asleep within 30 mins of start of study., Disp: 2 tablet, Rfl: 0      Objective:     Vitals:    05/03/19 0840   BP: 112/88   Pulse: 75   Weight: 96.2 kg (212 lb)   Height: 180.3 cm (70.98\")     Body mass index is 29.58 kg/m².    PHYSICAL EXAM:    Vitals Reviewed.   General Appearance: No acute distress, well developed and well nourished.   Eyes: Conjunctiva and lids: No erythema, swelling, or discharge. Sclera non-icteric.   HENT: Atraumatic, normocephalic. External eyes, ears, and nose normal.   Respiratory: No signs of respiratory distress. Respiration rhythm and depth normal.   Clear to auscultation. No rales, crackles, rhonchi, or wheezing auscultated.   Cardiovascular:  Jugular Venous Pressure: Normal  Heart Rate and Rhythm: Normal, Heart Sounds: Normal S1 and S2. No S3 or S4 noted.  Murmurs: No murmurs noted. No rubs, thrills, or gallops.   Arterial Pulses:  Posterior tibialis and dorsalis pedis pulses normal.   Lower Extremities: No edema noted.  Gastrointestinal:  Abdomen soft, non-distended, non-tender.   Musculoskeletal: Normal movement of extremities  Skin and Nails: General appearance normal. No pallor, cyanosis, " diaphoresis. Skin temperature normal. No clubbing of fingernails.   Psychiatric: Patient alert and oriented to person, place, and time. Speech and behavior appropriate. Normal mood and affect.       ECG 12 Lead  Date/Time: 5/3/2019 10:20 AM  Performed by: Angy Pineda APRN  Authorized by: Angy Pineda APRN   Comparison: compared with previous ECG   Similar to previous ECG  Rhythm: paced  BPM: 75  Pacing: atrial sensed rhythm and ventricular paced rhythm            Assessment:       Diagnosis Plan   1. Ischemic cardiomyopathy     2. S/P biventricular cardiac pacemaker procedure     3. Essential hypertension     4. Coronary artery disease involving native coronary artery of native heart without angina pectoris     5. Sleep apnea, unspecified type            Plan:       1.-2. ICM, s/p CRT-P. Normal function and testing. Lungs clear and no dyspnea but does have some increased lower extremity edema at times. Have instructed to extra 40 mg of furosemide for 2-3 days when he has increased lower extremity edema and on those days also take and extra 10meq of potassium.     3. HTN, controlled.     4. CAD, no chest pain.     5. PALMER, compliant with CPAP and seems to be helping fatigue.     Follow up with Dr. James in 6 months.     As always, it has been a pleasure to participate in your patient's care.      Sincerely,         LAM Shaw

## 2019-05-05 ENCOUNTER — HOSPITAL ENCOUNTER (OUTPATIENT)
Facility: HOSPITAL | Age: 69
Setting detail: OBSERVATION
Discharge: HOME | End: 2019-05-08
Attending: EMERGENCY MEDICINE | Admitting: HOSPITALIST

## 2019-05-05 ENCOUNTER — APPOINTMENT (OUTPATIENT)
Dept: CT IMAGING | Facility: HOSPITAL | Age: 69
End: 2019-05-05

## 2019-05-05 DIAGNOSIS — R79.89 ELEVATED LIVER FUNCTION TESTS: Primary | ICD-10-CM

## 2019-05-05 DIAGNOSIS — R73.9 HYPERGLYCEMIA: ICD-10-CM

## 2019-05-05 DIAGNOSIS — M62.81 MUSCLE WEAKNESS (GENERALIZED): ICD-10-CM

## 2019-05-05 DIAGNOSIS — R53.1 GENERALIZED WEAKNESS: ICD-10-CM

## 2019-05-05 DIAGNOSIS — R29.6 FREQUENT FALLS: ICD-10-CM
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
## 2019-05-05 PROBLEM — R55 NEAR SYNCOPE: Status: ACTIVE | Noted: 2019-05-05

## 2019-05-05 LAB
ALBUMIN SERPL-MCNC: 3.9 G/DL (ref 3.5–5.2)
ALBUMIN/GLOB SERPL: 1.1 G/DL
ALP SERPL-CCNC: 234 U/L (ref 39–117)
ALT SERPL W P-5'-P-CCNC: 40 U/L (ref 1–41)
ANION GAP SERPL CALCULATED.3IONS-SCNC: 18.1 MMOL/L
AST SERPL-CCNC: 63 U/L (ref 1–40)
BACTERIA UR QL AUTO: NORMAL /HPF
BASOPHILS # BLD AUTO: 0.05 10*3/MM3 (ref 0–0.2)
BASOPHILS NFR BLD AUTO: 1.1 % (ref 0–1.5)
BILIRUB SERPL-MCNC: 2.5 MG/DL (ref 0.2–1.2)
BILIRUB UR QL STRIP: NEGATIVE
BUN BLD-MCNC: 13 MG/DL (ref 8–23)
BUN/CREAT SERPL: 10.7 (ref 7–25)
CALCIUM SPEC-SCNC: 8.9 MG/DL (ref 8.6–10.5)
CHLORIDE SERPL-SCNC: 97 MMOL/L (ref 98–107)
CK SERPL-CCNC: 310 U/L (ref 20–200)
CLARITY UR: CLEAR
CO2 SERPL-SCNC: 23.9 MMOL/L (ref 22–29)
COLOR UR: YELLOW
CREAT BLD-MCNC: 1.22 MG/DL (ref 0.76–1.27)
DEPRECATED RDW RBC AUTO: 65.1 FL (ref 37–54)
EOSINOPHIL # BLD AUTO: 0.06 10*3/MM3 (ref 0–0.4)
EOSINOPHIL NFR BLD AUTO: 1.4 % (ref 0.3–6.2)
ERYTHROCYTE [DISTWIDTH] IN BLOOD BY AUTOMATED COUNT: 19.5 % (ref 12.3–15.4)
FINE GRAN CASTS URNS QL MICRO: NORMAL /LPF
GFR SERPL CREATININE-BSD FRML MDRD: 59 ML/MIN/1.73
GLOBULIN UR ELPH-MCNC: 3.7 GM/DL
GLUCOSE BLD-MCNC: 153 MG/DL (ref 65–99)
GLUCOSE BLDC GLUCOMTR-MCNC: 121 MG/DL (ref 70–130)
GLUCOSE BLDC GLUCOMTR-MCNC: 138 MG/DL (ref 70–130)
GLUCOSE UR STRIP-MCNC: NEGATIVE MG/DL
HCT VFR BLD AUTO: 47.7 % (ref 37.5–51)
HGB BLD-MCNC: 16.1 G/DL (ref 13–17.7)
HGB UR QL STRIP.AUTO: ABNORMAL
HYALINE CASTS UR QL AUTO: NORMAL /LPF
IMM GRANULOCYTES # BLD AUTO: 0.01 10*3/MM3 (ref 0–0.05)
IMM GRANULOCYTES NFR BLD AUTO: 0.2 % (ref 0–0.5)
KETONES UR QL STRIP: NEGATIVE
LEUKOCYTE ESTERASE UR QL STRIP.AUTO: NEGATIVE
LYMPHOCYTES # BLD AUTO: 0.66 10*3/MM3 (ref 0.7–3.1)
LYMPHOCYTES NFR BLD AUTO: 14.9 % (ref 19.6–45.3)
MAGNESIUM SERPL-MCNC: 2.4 MG/DL (ref 1.6–2.4)
MCH RBC QN AUTO: 31.3 PG (ref 26.6–33)
MCHC RBC AUTO-ENTMCNC: 33.8 G/DL (ref 31.5–35.7)
MCV RBC AUTO: 92.6 FL (ref 79–97)
MONOCYTES # BLD AUTO: 0.66 10*3/MM3 (ref 0.1–0.9)
MONOCYTES NFR BLD AUTO: 14.9 % (ref 5–12)
NEUTROPHILS # BLD AUTO: 3 10*3/MM3 (ref 1.7–7)
NEUTROPHILS NFR BLD AUTO: 67.5 % (ref 42.7–76)
NITRITE UR QL STRIP: NEGATIVE
NRBC BLD AUTO-RTO: 0 /100 WBC (ref 0–0.2)
NT-PROBNP SERPL-MCNC: 6017 PG/ML (ref 5–900)
PH UR STRIP.AUTO: <=5 [PH] (ref 5–8)
PLATELET # BLD AUTO: 146 10*3/MM3 (ref 140–450)
PMV BLD AUTO: 12.2 FL (ref 6–12)
POTASSIUM BLD-SCNC: 4.1 MMOL/L (ref 3.5–5.2)
PROT SERPL-MCNC: 7.6 G/DL (ref 6–8.5)
PROT UR QL STRIP: ABNORMAL
RBC # BLD AUTO: 5.15 10*6/MM3 (ref 4.14–5.8)
RBC # UR: NORMAL /HPF
REF LAB TEST METHOD: NORMAL
SODIUM BLD-SCNC: 139 MMOL/L (ref 136–145)
SP GR UR STRIP: 1.01 (ref 1–1.03)
SQUAMOUS #/AREA URNS HPF: NORMAL /HPF
TROPONIN T SERPL-MCNC: 0.03 NG/ML (ref 0–0.03)
UROBILINOGEN UR QL STRIP: ABNORMAL
WBC NRBC COR # BLD: 4.44 10*3/MM3 (ref 3.4–10.8)
WBC UR QL AUTO: NORMAL /HPF

## 2019-05-05 PROCEDURE — 93005 ELECTROCARDIOGRAM TRACING: CPT | Performed by: EMERGENCY MEDICINE

## 2019-05-05 PROCEDURE — 82962 GLUCOSE BLOOD TEST: CPT

## 2019-05-05 PROCEDURE — 84484 ASSAY OF TROPONIN QUANT: CPT | Performed by: EMERGENCY MEDICINE

## 2019-05-05 PROCEDURE — 82550 ASSAY OF CK (CPK): CPT | Performed by: EMERGENCY MEDICINE

## 2019-05-05 PROCEDURE — 70450 CT HEAD/BRAIN W/O DYE: CPT

## 2019-05-05 PROCEDURE — 80053 COMPREHEN METABOLIC PANEL: CPT | Performed by: EMERGENCY MEDICINE

## 2019-05-05 PROCEDURE — 51798 US URINE CAPACITY MEASURE: CPT

## 2019-05-05 PROCEDURE — G0378 HOSPITAL OBSERVATION PER HR: HCPCS

## 2019-05-05 PROCEDURE — 83880 ASSAY OF NATRIURETIC PEPTIDE: CPT | Performed by: EMERGENCY MEDICINE

## 2019-05-05 PROCEDURE — 93010 ELECTROCARDIOGRAM REPORT: CPT | Performed by: INTERNAL MEDICINE

## 2019-05-05 PROCEDURE — 83735 ASSAY OF MAGNESIUM: CPT | Performed by: EMERGENCY MEDICINE

## 2019-05-05 PROCEDURE — 85025 COMPLETE CBC W/AUTO DIFF WBC: CPT | Performed by: EMERGENCY MEDICINE

## 2019-05-05 PROCEDURE — 99285 EMERGENCY DEPT VISIT HI MDM: CPT

## 2019-05-05 PROCEDURE — 81001 URINALYSIS AUTO W/SCOPE: CPT | Performed by: EMERGENCY MEDICINE

## 2019-05-05 RX ORDER — NICOTINE POLACRILEX 4 MG
15 LOZENGE BUCCAL
Status: DISCONTINUED | OUTPATIENT
Start: 2019-05-05 | End: 2019-05-08 | Stop reason: HOSPADM

## 2019-05-05 RX ORDER — SODIUM CHLORIDE 9 MG/ML
125 INJECTION, SOLUTION INTRAVENOUS CONTINUOUS
Status: DISCONTINUED | OUTPATIENT
Start: 2019-05-05 | End: 2019-05-05

## 2019-05-05 RX ORDER — ACETAMINOPHEN 325 MG/1
650 TABLET ORAL EVERY 4 HOURS PRN
Status: DISCONTINUED | OUTPATIENT
Start: 2019-05-05 | End: 2019-05-08 | Stop reason: HOSPADM

## 2019-05-05 RX ORDER — SODIUM CHLORIDE 0.9 % (FLUSH) 0.9 %
3 SYRINGE (ML) INJECTION EVERY 12 HOURS SCHEDULED
Status: DISCONTINUED | OUTPATIENT
Start: 2019-05-05 | End: 2019-05-08 | Stop reason: HOSPADM

## 2019-05-05 RX ORDER — ONDANSETRON 2 MG/ML
4 INJECTION INTRAMUSCULAR; INTRAVENOUS EVERY 6 HOURS PRN
Status: DISCONTINUED | OUTPATIENT
Start: 2019-05-05 | End: 2019-05-08 | Stop reason: HOSPADM

## 2019-05-05 RX ORDER — SODIUM CHLORIDE 0.9 % (FLUSH) 0.9 %
1-10 SYRINGE (ML) INJECTION AS NEEDED
Status: DISCONTINUED | OUTPATIENT
Start: 2019-05-05 | End: 2019-05-08 | Stop reason: HOSPADM

## 2019-05-05 RX ORDER — ONDANSETRON 4 MG/1
4 TABLET, FILM COATED ORAL EVERY 6 HOURS PRN
Status: DISCONTINUED | OUTPATIENT
Start: 2019-05-05 | End: 2019-05-08 | Stop reason: HOSPADM

## 2019-05-05 RX ORDER — NITROGLYCERIN 0.4 MG/1
0.4 TABLET SUBLINGUAL
Status: DISCONTINUED | OUTPATIENT
Start: 2019-05-05 | End: 2019-05-08 | Stop reason: HOSPADM

## 2019-05-05 RX ORDER — SODIUM CHLORIDE 0.9 % (FLUSH) 0.9 %
10 SYRINGE (ML) INJECTION AS NEEDED
Status: DISCONTINUED | OUTPATIENT
Start: 2019-05-05 | End: 2019-05-08 | Stop reason: HOSPADM

## 2019-05-05 RX ORDER — DEXTROSE MONOHYDRATE 25 G/50ML
25 INJECTION, SOLUTION INTRAVENOUS
Status: DISCONTINUED | OUTPATIENT
Start: 2019-05-05 | End: 2019-05-08 | Stop reason: HOSPADM

## 2019-05-06 ENCOUNTER — TELEPHONE (OUTPATIENT)
Dept: INTERNAL MEDICINE | Facility: CLINIC | Age: 69
End: 2019-05-06

## 2019-05-06 ENCOUNTER — APPOINTMENT (OUTPATIENT)
Dept: GENERAL RADIOLOGY | Facility: HOSPITAL | Age: 69
End: 2019-05-06

## 2019-05-06 LAB
ANION GAP SERPL CALCULATED.3IONS-SCNC: 12.7 MMOL/L
BUN BLD-MCNC: 12 MG/DL (ref 8–23)
BUN/CREAT SERPL: 9.5 (ref 7–25)
CALCIUM SPEC-SCNC: 8.5 MG/DL (ref 8.6–10.5)
CHLORIDE SERPL-SCNC: 97 MMOL/L (ref 98–107)
CO2 SERPL-SCNC: 30.3 MMOL/L (ref 22–29)
CREAT BLD-MCNC: 1.26 MG/DL (ref 0.76–1.27)
DEPRECATED RDW RBC AUTO: 62.4 FL (ref 37–54)
ERYTHROCYTE [DISTWIDTH] IN BLOOD BY AUTOMATED COUNT: 18.9 % (ref 12.3–15.4)
GFR SERPL CREATININE-BSD FRML MDRD: 57 ML/MIN/1.73
GLUCOSE BLD-MCNC: 134 MG/DL (ref 65–99)
GLUCOSE BLDC GLUCOMTR-MCNC: 101 MG/DL (ref 70–130)
GLUCOSE BLDC GLUCOMTR-MCNC: 120 MG/DL (ref 70–130)
GLUCOSE BLDC GLUCOMTR-MCNC: 128 MG/DL (ref 70–130)
GLUCOSE BLDC GLUCOMTR-MCNC: 164 MG/DL (ref 70–130)
HBA1C MFR BLD: 6.9 % (ref 4.8–5.6)
HCT VFR BLD AUTO: 44.3 % (ref 37.5–51)
HGB BLD-MCNC: 15 G/DL (ref 13–17.7)
MCH RBC QN AUTO: 31.1 PG (ref 26.6–33)
MCHC RBC AUTO-ENTMCNC: 33.9 G/DL (ref 31.5–35.7)
MCV RBC AUTO: 91.7 FL (ref 79–97)
PLATELET # BLD AUTO: 146 10*3/MM3 (ref 140–450)
PMV BLD AUTO: 12.3 FL (ref 6–12)
POTASSIUM BLD-SCNC: 3.3 MMOL/L (ref 3.5–5.2)
POTASSIUM BLD-SCNC: 4.3 MMOL/L (ref 3.5–5.2)
RBC # BLD AUTO: 4.83 10*6/MM3 (ref 4.14–5.8)
SODIUM BLD-SCNC: 140 MMOL/L (ref 136–145)
WBC NRBC COR # BLD: 4.3 10*3/MM3 (ref 3.4–10.8)

## 2019-05-06 PROCEDURE — 71046 X-RAY EXAM CHEST 2 VIEWS: CPT

## 2019-05-06 PROCEDURE — 80048 BASIC METABOLIC PNL TOTAL CA: CPT | Performed by: NURSE PRACTITIONER

## 2019-05-06 PROCEDURE — 83036 HEMOGLOBIN GLYCOSYLATED A1C: CPT | Performed by: NURSE PRACTITIONER

## 2019-05-06 PROCEDURE — 82962 GLUCOSE BLOOD TEST: CPT

## 2019-05-06 PROCEDURE — 85027 COMPLETE CBC AUTOMATED: CPT | Performed by: NURSE PRACTITIONER

## 2019-05-06 PROCEDURE — 36415 COLL VENOUS BLD VENIPUNCTURE: CPT | Performed by: NURSE PRACTITIONER

## 2019-05-06 PROCEDURE — 97161 PT EVAL LOW COMPLEX 20 MIN: CPT

## 2019-05-06 PROCEDURE — G0378 HOSPITAL OBSERVATION PER HR: HCPCS

## 2019-05-06 PROCEDURE — 97110 THERAPEUTIC EXERCISES: CPT

## 2019-05-06 PROCEDURE — 99214 OFFICE O/P EST MOD 30 MIN: CPT | Performed by: INTERNAL MEDICINE

## 2019-05-06 PROCEDURE — 63710000001 INSULIN GLARGINE PER 5 UNITS: Performed by: NURSE PRACTITIONER

## 2019-05-06 PROCEDURE — 84132 ASSAY OF SERUM POTASSIUM: CPT | Performed by: HOSPITALIST

## 2019-05-06 PROCEDURE — 97166 OT EVAL MOD COMPLEX 45 MIN: CPT

## 2019-05-06 PROCEDURE — 63710000001 INSULIN LISPRO (HUMAN) PER 5 UNITS: Performed by: NURSE PRACTITIONER

## 2019-05-06 RX ORDER — INSULIN GLARGINE 100 [IU]/ML
20 INJECTION, SOLUTION SUBCUTANEOUS EVERY 12 HOURS SCHEDULED
Status: DISCONTINUED | OUTPATIENT
Start: 2019-05-06 | End: 2019-05-08 | Stop reason: HOSPADM

## 2019-05-06 RX ORDER — FUROSEMIDE 40 MG/1
40 TABLET ORAL DAILY
Status: DISCONTINUED | OUTPATIENT
Start: 2019-05-06 | End: 2019-05-08 | Stop reason: HOSPADM

## 2019-05-06 RX ORDER — ASPIRIN 81 MG/1
81 TABLET, CHEWABLE ORAL DAILY
Status: DISCONTINUED | OUTPATIENT
Start: 2019-05-06 | End: 2019-05-08 | Stop reason: HOSPADM

## 2019-05-06 RX ORDER — ATORVASTATIN CALCIUM 20 MG/1
20 TABLET, FILM COATED ORAL DAILY
Status: DISCONTINUED | OUTPATIENT
Start: 2019-05-06 | End: 2019-05-06

## 2019-05-06 RX ORDER — CITALOPRAM 20 MG/1
20 TABLET ORAL DAILY
Status: DISCONTINUED | OUTPATIENT
Start: 2019-05-06 | End: 2019-05-08 | Stop reason: HOSPADM

## 2019-05-06 RX ORDER — POTASSIUM CHLORIDE 750 MG/1
40 CAPSULE, EXTENDED RELEASE ORAL AS NEEDED
Status: DISCONTINUED | OUTPATIENT
Start: 2019-05-06 | End: 2019-05-08 | Stop reason: HOSPADM

## 2019-05-06 RX ORDER — SPIRONOLACTONE 25 MG/1
25 TABLET ORAL DAILY
Status: DISCONTINUED | OUTPATIENT
Start: 2019-05-06 | End: 2019-05-08 | Stop reason: HOSPADM

## 2019-05-06 RX ORDER — CARVEDILOL 12.5 MG/1
12.5 TABLET ORAL DAILY
Status: DISCONTINUED | OUTPATIENT
Start: 2019-05-06 | End: 2019-05-06

## 2019-05-06 RX ORDER — POTASSIUM CHLORIDE 1.5 G/1.77G
40 POWDER, FOR SOLUTION ORAL AS NEEDED
Status: DISCONTINUED | OUTPATIENT
Start: 2019-05-06 | End: 2019-05-08 | Stop reason: HOSPADM

## 2019-05-06 RX ORDER — POTASSIUM CHLORIDE 750 MG/1
10 CAPSULE, EXTENDED RELEASE ORAL DAILY
Status: DISCONTINUED | OUTPATIENT
Start: 2019-05-06 | End: 2019-05-08 | Stop reason: HOSPADM

## 2019-05-06 RX ADMIN — CARVEDILOL 12.5 MG: 12.5 TABLET, FILM COATED ORAL at 08:26

## 2019-05-06 RX ADMIN — FUROSEMIDE 40 MG: 40 TABLET ORAL at 08:26

## 2019-05-06 RX ADMIN — SPIRONOLACTONE 25 MG: 25 TABLET, FILM COATED ORAL at 08:26

## 2019-05-06 RX ADMIN — SODIUM CHLORIDE, PRESERVATIVE FREE 3 ML: 5 INJECTION INTRAVENOUS at 00:00

## 2019-05-06 RX ADMIN — INSULIN LISPRO 2 UNITS: 100 INJECTION, SOLUTION INTRAVENOUS; SUBCUTANEOUS at 17:23

## 2019-05-06 RX ADMIN — CITALOPRAM 20 MG: 20 TABLET, FILM COATED ORAL at 08:26

## 2019-05-06 RX ADMIN — POTASSIUM CHLORIDE 40 MEQ: 750 CAPSULE, EXTENDED RELEASE ORAL at 08:26

## 2019-05-06 RX ADMIN — POTASSIUM CHLORIDE 10 MEQ: 750 CAPSULE, EXTENDED RELEASE ORAL at 08:26

## 2019-05-06 RX ADMIN — ATORVASTATIN CALCIUM 20 MG: 20 TABLET, FILM COATED ORAL at 08:26

## 2019-05-06 RX ADMIN — ASPIRIN 81 MG: 81 TABLET, DELAYED RELEASE ORAL at 08:29

## 2019-05-06 RX ADMIN — POTASSIUM CHLORIDE 40 MEQ: 750 CAPSULE, EXTENDED RELEASE ORAL at 13:53

## 2019-05-06 RX ADMIN — INSULIN GLARGINE 20 UNITS: 100 INJECTION, SOLUTION SUBCUTANEOUS at 20:51

## 2019-05-06 RX ADMIN — INSULIN GLARGINE 20 UNITS: 100 INJECTION, SOLUTION SUBCUTANEOUS at 08:30

## 2019-05-06 RX ADMIN — SODIUM CHLORIDE, PRESERVATIVE FREE 3 ML: 5 INJECTION INTRAVENOUS at 08:27

## 2019-05-06 NOTE — CONSULTS
Patient Name: Andreas Rubin  Age/Sex: 68 y.o. male  : 1950  MRN: [id number]    Date of Admission: 2019  Date of Encounter Visit: 19  Encounter Provider: Anny Nina RN  Place of Service: [facility] CARDIOLOG[facility]      Referring Provider: Agustin Tee*  Patient Care Team:  Luis Dsouza Jr., MD as PCP - General (Family Medicine)  Helio Carpenter MD as PCP - Claims Attributed  Garth Arias MD as Consulting Physician (Vascular Surgery)  Regino James MD as Consulting Physician (Cardiology)  Drew Wade MD as Consulting Physician (Ophthalmology)  Jim Irene DO as Consulting Physician (Neurology)  Adan Barnes MD as Consulting Physician (Pulmonary Disease)  Helio Carpenter MD as Consulting Physician (Endocrinology)    Subjective:     Consulted for:  Syncope/Falls    Chief Complaint: Weakness     History of Present Illness:  Andreas Rubin is a 68 y.o. male of mine with history of intellectual disability, cerebellar ataxia and stroke, PALMER ( recently started back on CPAP), paroxysmal atrial fibrillation ( no AC due to falls), CAD s/p stent in , ischemic cardiomyopathy s/p [employer] CRT pacemaker. His last echocardiogram was on 3/12/19 where his left ventricular cavity was severely dilated with severely decreased LV systolic function, EF of 16, right atrial and ventricular cavity severely dilated, mild mitral, tricuspid, and pulmonic valve regurgitation. ( His EF was 28% in ).     He was recently seen for an office visit on 5/3/19 where he saw my APRN Angy Pineda for a follow up regarding his fatigue. In January his caregiver, his brother had reported that he felt that his brother was sleeping more frequently. On 5/3/19, it was felt that his fatigue was better due to the use of his CPAP. He denies having any shortness of breath or chest pain, but was having some intermittent swelling in his legs, where he had not  been taking any extra diuretic. He did have his device interrogated which showed normal function and the following: A paced 94%, Biv paced 97%. No arrhythmia episodes. He was instructed to take an extra 40 mg of lasix for 2-3 days for his LE edema, and to also take 10 meq of potassium on those days. He was to follow up in 6 months.     He came into the ER last night for worsening weakness and 2 falls at home where he did not sustain injuries. He denied losing consciousness. ER work up was negative. It was noted that his brother did not feel comfortable taking him home.     Troponin of 0.028. proBNP elevated at 6017. K was low this morning at 3.3, was 4.1 on admission.     I spoke with him and his brother at the bedside.  He is been having severe unsteadiness of gait especially upon standing for many weeks.  The episode yesterday he just fell over and he was immobile on the ground for 10 minutes he was not passed out but he could get up for 10 minutes.  He is been increasingly frail, losing weight and having shortness of breath that is increasing over the past year or 2.  Previous testing:   Echocardiogram on 3/12/19-  Interpretation Summary     · Calculated right ventricular systolic pressure from tricuspid regurgitation is 43 mmHg.  · The left ventricular cavity is severely dilated.  · Estimated EF = 16%.  · Left ventricular systolic function is severely decreased.  · There is moderate calcification of the aortic valve mainly affecting the non, left and right coronary cusp(s).  · Right ventricular cavity is severely dilated.  · Severely reduced right ventricular systolic function noted.  · Left atrial cavity size is severely dilated.  · Mild mitral valve regurgitation is present  · Mild tricuspid valve regurgitation is present.  · Mild pulmonic valve regurgitation is present.  · The following left ventricular wall segments are hypokinetic: mid anterior, apical anterior, basal anterolateral, mid anterolateral,  apical lateral, basal inferolateral, mid inferolateral, apical inferior, mid inferior, apical septal, basal inferoseptal, mid inferoseptal, apex hypokinetic, mid anteroseptal, basal anterior, basal inferior and basal inferoseptal.     Echocardiogram on 7/9/15-  Conclusions  The left ventricular chamber size is severely dilated.  There is moderate to severely decreased left ventricular systolic  function.   The ejection fraction was measured at 28%.  Severe global hypokinesis of the left ventricle is observed.  The left ventricular diastolic filling pattern is consistent with  elevated mean left atrial pressure.  The left atrium is severely dilated.  The right ventricle is moderate to severely dilated.   The right ventricular global systolic function is severely reduced.  A pacemaker wire is visualized in the right ventricle.  The right atrial cavity size is severely dilated.  The aortic valve leaflets are moderately thickened.  Trace to mild aortic regurgitation is present.  There is mild mitral regurgitation. This is likely underestimated.  There is mild tricuspid regurgitation.  The right ventricular systolic pressure is normal.  The right ventricular systolic pressure is calculated at 28 mmHg.   A trivial pericardial effusion is visualized.  A pacemaker wire is visualized in the right ventricle.      Past Medical History:  Past Medical History:   Diagnosis Date   • Allergic rhinitis    • ASHD (arteriosclerotic heart disease)    • Atrial fibrillation (CMS/HCC)    • AV block    • Cardiomyopathy (CMS/HCC)    • Cerebellar stroke (CMS/HCC)    • CHF (congestive heart failure) (CMS/HCC)    • Coronary artery disease    • Depression    • DM type 2 (diabetes mellitus, type 2) (CMS/HCC)    • Encounter for special screening examination for neoplasm of prostate    • Fatigue    • Hot flashes    • Hyperlipidemia    • Hypertension    • Hypotension    • Imbalance    • Intermittent claudication (CMS/HCC)    • Ischemic  cardiomyopathy    • PALMER (obstructive sleep apnea)    • S/P angioplasty with stent    • S/P biventricular cardiac pacemaker procedure    • Sleep apnea        Past Surgical History:   Procedure Laterality Date   • BUNIONECTOMY      BILATERAL FEET   • CARDIAC CATHETERIZATION Left 06/16/2011    [facility], Dr. Walt Saab, coronaries and LV gram   • [facility]ARD[facility]ION  [facility] Dr. James, cardioverison of atrial flutter to sinus rhythm, reprogramming of pacemaker   • CAROTID STENT      EF=10-15%LEFT MAIN NORMAL , MILD LUMINAL IRR OF LED AND 20% DISEASE OF THE CIRCUMFLEX 80% LESIONIN THE PROX RCA THAT WAS ACTUALLY NONDOMINANT 2.5 X 18 MM VISION BARE METAL STENT IN THE MID RCA    • CATARACT EXTRACTION Bilateral    • COLONOSCOPY N/A 3/3/2017    Procedure: COLONOSCOPY TO CECUM;  Surgeon: Benton Castellanos MD;  Location: [facility] ENDOSCOPY;  Service:    • CORONARY ANGIOPLASTY WITH STENT PLACEMENT  06/16/2011    EF = 10-15%.  Left main was normal, mild luminal irregularities of LAD, and 20% disease of the circumflex.  80% lesion in the prox RCA that was actually nondominant.  2.5 x 18mm Vision bare metal stent in the mid RCA.   • ELBOW PROCEDURE      REMOVAL OF NODULE ON LEFT ELBOW   • KNEE ACL RECONSTRUCTION      LEFT KNEE   • [facility]   • PACEMAKER REPLACEMENT  11/12/2015       Home Medications:   Medications Prior to Admission   Medication Sig Dispense Refill Last Dose   • aspirin 81 MG tablet Take 1 tablet by mouth Daily. 30 tablet 11 Taking   • atorvastatin (LIPITOR) 40 MG tablet ALTERNATE 1 TABLET AND 1/2 TABLET DAILY 30 tablet 3 Taking   • JERRY MICROLET LANCETS lancets daily.   Taking   • carvedilol (COREG) 6.25 MG tablet Take 1 tablet by mouth 2 (Two) Times a Day. (Patient taking differently: Take 12.5 mg by mouth Daily.) 180 tablet 3 Taking   • citalopram (CeleXA) 20 MG tablet TAKE 1 TABLET BY MOUTH  EVERYDAY. 90 tablet 1 Taking   • furosemide (LASIX) 40 MG tablet TAKE 1 TABLET BY MOUTH EVERY DAY 30 tablet 1 Taking   • Glucose Blood (JRERY CONTOUR NEXT TEST VI) 4 (four) times a day.   Taking   • glucose blood (ONE TOUCH ULTRA TEST) test strip TESTING BS 1 X DAY DX CODE E11.8 100 each 1 Taking   • HUMALOG KWIKPEN 100 UNIT/ML solution pen-injector 15 units 3 times a day with meals 46 mL 1 Taking   • insulin detemir (LEVEMIR FLEXTOUCH) 100 UNIT/ML injection 30 units every morning and 26 units every evening 54 mL 1 Taking   • KLOR-CON 10 MEQ CR tablet TAKE 1 TABLET BY MOUTH EVERY DAY 90 tablet 0 Taking   • metFORMIN (GLUCOPHAGE) 500 MG tablet TAKE 1 TABLET BY MOUTH TWICE A DAY WITH MEALS 180 tablet 1 Taking   • Multiple Vitamins-Minerals (MULTI FOR HIM) capsule Take 1 tablet/day by mouth daily.   Taking   • nitroglycerin (NITROSTAT) 0.4 MG SL tablet Place under the tongue.   Taking   • rOPINIRole (REQUIP) 1 MG tablet Take 1 tablet by mouth.   5/4/2019 at Unknown time   • vitamin E ([employer] VITAMIN E) 400 UNIT capsule Take  by mouth.   Taking   • spironolactone (ALDACTONE) 25 MG tablet TAKE 1 TABLET DAILY. 30 tablet 5 Taking       Allergies:  Allergies   Allergen Reactions   • Codeine Diarrhea     diarrhea   • Codeine Sulfate        Past Social History:  Social History     Socioeconomic History   • Marital status: Single     Spouse name: Not on file   • Number of children: Not on file   • Years of education: Not on file   • Highest education level: Not on file   Tobacco Use   • Smoking status: Never Smoker   • Smokeless tobacco: Never Used   • Tobacco comment: caffeine use   Substance and Sexual Activity   • Alcohol use: Yes     Comment: ocasional   • Drug use: No        Past Family History:  Family History   Problem Relation Age of Onset   • Lung cancer Mother    • Stroke Father    • Heart attack Father    • Lung cancer Father    • Diabetes Brother    • Hyperlipidemia Brother    • Diabetes Brother    • Throat cancer  Brother          Review of Systems:  All systems reviewed. Pertinent positives identified in HPI. All other systems are negative.   REVIEW OF SYSTEMS:   CONSTITUTIONAL: No weight loss, fever, chills, weakness or fatigue.   HEENT: Eyes: No visual loss, blurred vision, double vision or yellow sclerae. Ears, Nose, Throat: No hearing loss, sneezing, congestion, runny nose or sore throat.   SKIN: No rash or itching.     RESPIRATORY: +shortness of breath, hemoptysis, cough or sputum.   GASTROINTESTINAL: No anorexia, nausea, vomiting or diarrhea. No abdominal pain, bright red blood per rectum or melena.  GENITOURINARY: No burning on urination, hematuria or increased frequency.  NEUROLOGICAL: No headache, dizziness, syncope, paralysis, +ataxia, numbness or tingling in the extremities. No change in bowel or bladder control.   MUSCULOSKELETAL: No muscle, back pain, joint pain or stiffness.   HEMATOLOGIC: No anemia, bleeding or bruising.   LYMPHATICS: No enlarged nodes. No history of splenectomy.   PSYCHIATRIC: No history of depression, anxiety, hallucinations.   ENDOCRINOLOGIC: No reports of sweating, cold or heat intolerance. No polyuria or polydipsia.       Objective:     Objective:  Temp:  [96.6 °F (35.9 °C)-97.5 °F (36.4 °C)] 97.5 °F (36.4 °C)  Heart Rate:  [68-76] 76  Resp:  [18-20] 20  BP: (110-133)/(75-99) 121/79    Intake/Output Summary (Last 24 hours) at 5/6/2019 0715  Last data filed at 5/6/2019 0449  Gross per 24 hour   Intake 460 ml   Output 1125 ml   Net -665 ml     Body mass index is 26.96 kg/m².      05/05/19  2109 05/05/19  2354 05/06/19  0415   Weight: 94.4 kg (208 lb 3.2 oz) 92.3 kg (203 lb 7.8 oz) 92.7 kg (204 lb 5.9 oz)           Physical Exam:   General Appearance Well developed, cooperative and well nourished and no acute distress   Head Normocephalic, without abnormality, atraumatic   Ears Ears appear intact with no abnormalities noted   Throat No oral lesions, no thrush, oral mucosa moist   Neck No  adenopathy, supple, trachea midline, no thyromegaly, no carotid bruit, no JVD   Back No skin lesions, erythema or scars, no tenderness to percussion or palptaion,range of motion is normal   Lungs  decreased breath sounds on the right base.   Heart Regular rhythm and normal rate, normal S1 and S2, no murmur, no gallop, no rub, no click   Chest wall No abnormalities observed   Abdomen Normal bowel sounds, no masses, no hepatomegaly,    Extremities Moves all extremities well, no edema, no cyanosis, no redness   Pulses Pulses palpable and equal bilaterally. Normal radial, carotid, femoral, dorsalis pedis and posterior tibial pulses bilaterally. Normal abdominal aorta   Skin No bleeding, bruising or rash   Psyhiatric Alert and oriented x 3, normal mood and affect       Lab Review:     Results from last 7 days   Lab Units 05/06/19  0538 05/05/19  2133   SODIUM mmol/L 140 139   POTASSIUM mmol/L 3.3* 4.1   CHLORIDE mmol/L 97* 97*   CO2 mmol/L 30.3* 23.9   BUN mg/dL 12 13   CREATININE mg/dL 1.26 1.22   GLUCOSE mg/dL 134* 153*   CALCIUM mg/dL 8.5* 8.9       Results from last 7 days   Lab Units 05/05/19  2133   CK TOTAL U/L 310*   TROPONIN T ng/mL 0.028     Results from last 7 days   Lab Units 05/06/19  0538   WBC 10*3/mm3 4.30   HEMOGLOBIN g/dL 15.0   HEMATOCRIT % 44.3   PLATELETS 10*3/mm3 146             Results from last 7 days   Lab Units 05/05/19  2133   MAGNESIUM mg/dL 2.4         Results from last 7 days   Lab Units 05/05/19  2133   PROBNP pg/mL 6,017.0*               Imaging:    Imaging Results (most recent)     Procedure Component Value Units Date/Time    CT Head Without Contrast [[id number]] Collected:  05/05/19 2237     Updated:  05/05/19 2242    Narrative:       CT OF THE HEAD WITHOUT CONTRAST     HISTORY: Dizziness and weakness. Multiple falls today.     COMPARISON: 12/26/2018     TECHNIQUE: Axial CT imaging was obtained from the vertex of the skull  and skull base. No IV contrast was administered.      FINDINGS:  No acute intracranial hemorrhage is identified. There is diffuse  atrophy, with compensatory ventricular dilatation. It may be mildly  advanced for the patient's age of 68. There is periventricular and deep  white matter microangiopathic disease. There is an old right cerebellar  infarct. There is no midline shift or mass effect. The paranasal sinuses  and mastoid air cells appear clear. No calvarial fracture is seen. No  focal soft tissue abnormalities are identified.       Impression:       No acute intracranial process identified.     Radiation dose reduction techniques were utilized, including automated  exposure control and exposure modulation based on body size.     This report was finalized on 5/5/2019 10:39 PM by Dr. Mell Muse M.D.             Results for orders placed during the hospital encounter of 03/12/19   Adult Transthoracic Echo Complete W/ Cont if Necessary Per Protocol    Narrative · Calculated right ventricular systolic pressure from tricuspid   regurgitation is 43 mmHg.  · The left ventricular cavity is severely dilated.  · Estimated EF = 16%.  · Left ventricular systolic function is severely decreased.  · There is moderate calcification of the aortic valve mainly affecting the   non, left and right coronary cusp(s).  · Right ventricular cavity is severely dilated.  · Severely reduced right ventricular systolic function noted.  · Left atrial cavity size is severely dilated.  · Mild mitral valve regurgitation is present  · Mild tricuspid valve regurgitation is present.  · Mild pulmonic valve regurgitation is present.  · The following left ventricular wall segments are hypokinetic: mid   anterior, apical anterior, basal anterolateral, mid anterolateral, apical   lateral, basal inferolateral, mid inferolateral, apical inferior, mid   inferior, apical septal, basal inferoseptal, mid inferoseptal, apex   hypokinetic, mid anteroseptal, basal anterior, basal inferior and basal    inferoseptal.        Telemetry-    [id number]      BASELINE: 19-        I personally viewed and interpreted the patient's EKG/Telemetry data.    Assessment:   Assessment/Plan         Near syncope    Hyperlipidemia    Essential hypertension    Coronary artery disease involving native coronary artery of native heart without angina pectoris    Sleep apnea    Paroxysmal atrial fibrillation (CMS/HCC)    Type 2 diabetes mellitus with peripheral neuropathy (CMS/HCC)    Elevated liver function tests    Falls        Plan:      The main problem is unsteadiness of gait and recurrent falls.  The differential diagnosis is obviously long and probably includes many things.  His device has been interrogated by the notes and has shown no arrhythmias.  Kia saw him on Friday and he had no arrhythmias on Friday.  The episodes are very much related to posture.  I suspect that this is at least in someway related to low blood pressure.  As such I am going to try and reduce his medications.  I will DC his carvedilol in hopes that improving his blood pressure will help.  I probably would continue him on his diuretics as with his left ventricular dysfunction he will likely get into volume overload.  I have also stopped his statin drug as this offers him no significant benefit at this time.    He has chronic left ventricular dysfunction without any obvious acute heart failure exacerbation at this time.  He does have decreased breath sounds on the right base.  I would order a chest x-ray to evaluate that.    Sadly, he may be reaching the point where he will need long-term care.    His CRT device does seem to be working well.  His ECG shows good biventricular capture    Thank you for allowing me to participate in the care of Andreas Rubin. Feel free to contact me directly with any further questions or concerns.    Anny Nina RN  [facility]  19  7:15 AM

## 2019-05-06 NOTE — THERAPY EVALUATION
Acute Care - Physical Therapy Initial Evaluation  [facility]     Patient Name: Andreas Rubin  : 1950  MRN: [id number]  Today's Date: 2019   Onset of Illness/Injury or Date of Surgery: 19  Date of Referral to PT: 19  Referring Physician: Heidi Reyes      Admit Date: 2019    Visit Dx:     ICD-10-CM ICD-9-CM   1. Elevated liver function tests R94.5 790.6   2. Frequent falls R29.6 V15.88   3. Generalized weakness R53.1 780.79   4. Hyperglycemia R73.9 790.29   5. Muscle weakness (generalized) M62.81 728.87     Patient Active Problem List   Diagnosis   • Type 2 diabetes mellitus with complication, with long-term current use of insulin (CMS/HCC)   • Type 2 diabetes mellitus with diabetic retinopathy (CMS/HCC)   • Hyperlipidemia   • Essential hypertension   • Coronary artery disease involving native coronary artery of native heart without angina pectoris   • S/P angioplasty with stent   • Sleep apnea   • Right-sided carotid artery disease (CMS/HCC)   • Hypotension, postural   • Cardiomyopathy (CMS/HCC)   • Paroxysmal atrial fibrillation (CMS/HCC)   • S/P biventricular cardiac pacemaker procedure   • Falls   • Dizziness   • History of cerebellar stroke   • Cerebellar ataxia (CMS/HCC)   • Type 2 diabetes mellitus with peripheral neuropathy (CMS/HCC)   • Serum potassium elevated   • Elevated liver function tests   • Falls   • Near syncope     Past Medical History:   Diagnosis Date   • Allergic rhinitis    • ASHD (arteriosclerotic heart disease)    • Atrial fibrillation (CMS/[facility])    • AV block    • Cardiomyopathy (CMS/HCC)    • Cerebellar stroke (CMS/HCC)    • CHF (congestive heart failure) (CMS/HCC)    • Coronary artery disease    • Depression    • DM type 2 (diabetes mellitus, type 2) (CMS/HCC)    • Encounter for special screening examination for neoplasm of prostate    • Fatigue    • Hot flashes    • Hyperlipidemia    • Hypertension    • Hypotension    • Imbalance    •  "Intermittent claudication (CMS/HCC)    • Ischemic cardiomyopathy    • PALMER (obstructive sleep apnea)    • S/P angioplasty with stent    • S/P biventricular cardiac pacemaker procedure    • Sleep apnea      Past Surgical History:   Procedure Laterality Date   • BUNIONECTOMY      BILATERAL FEET   • CARDIAC CATHETERIZATION Left 06/16/2011    [facility], Dr. Walt Saab, coronaries and LV gram   • CARDIOVERSION  08/20/2009    [facility], Dr. James, cardioverison of atrial flutter to sinus rhythm, reprogramming of pacemaker   • CAROTID STENT      EF=10-15%LEFT MAIN NORMAL , MILD LUMINAL IRR OF LED AND 20% DISEASE OF THE CIRCUMFLEX 80% LESIONIN THE PROX RCA THAT WAS ACTUALLY NONDOMINANT 2.5 X 18 MM VISION BARE METAL STENT IN THE MID RCA    • CATARACT EXTRACTION Bilateral    • COLONOSCOPY N/A 3/3/2017    Procedure: COLONOSCOPY TO CECUM;  Surgeon: Benton Castellanos MD;  Location: [facility] ENDOSCOPY;  Service:    • CORONARY ANGIOPLASTY WITH STENT PLACEMENT  06/16/2011    EF = 10-15%.  Left main was normal, mild luminal irregularities of LAD, and 20% disease of the circumflex.  80% lesion in the prox RCA that was actually nondominant.  2.5 x 18mm Vision bare metal stent in the mid RCA.   • ELBOW PROCEDURE      REMOVAL OF NODULE ON LEFT ELBOW   • KNEE ACL RECONSTRUCTION      LEFT KNEE   • [facility]   • PACEMAKER REPLACEMENT  11/12/2015        PT ASSESSMENT (last 12 hours)      Physical Therapy Evaluation     Row Name 05/06/19 0956          PT Evaluation Time/Intention    Subjective Information  complains of;fatigue;dizziness  -MS     Document Type  evaluation  -MS     Mode of Treatment  physical therapy;individual therapy  -MS     Patient Effort  good  -MS     Comment  Pt. reports feeling dizzy and \"light headed\" during ambulation.  -MS     Row Name 05/06/19 0956          General Information    Onset of Illness/Injury or Date " of Surgery  05/05/19  -MS     Referring Physician  Heidi Reyes  -MS     Patient Observations  agree to therapy;cooperative  -MS     Prior Level of Function  independent:  -MS     Equipment Currently Used at Home  none  -MS     Pertinent History of Current Functional Problem  Pt. admitted with Near Syncope and h/o recent falls at home  -MS     Existing Precautions/Restrictions  fall  (Significant)  Exit alarm  -MS     Risks Reviewed  patient:  -MS     Benefits Reviewed  patient:  -MS     Barriers to Rehab  cognitive status  -MS     Row Name 05/06/19 0956          Cognitive Assessment/Intervention- PT/OT    Orientation Status (Cognition)  oriented to;person;place  -MS     Follows Commands (Cognition)  follows one step commands;WNL  -MS     Personal Safety Interventions  fall prevention program maintained;gait belt;nonskid shoes/slippers when out of bed;supervised activity  -MS     Row Name 05/06/19 0956          Bed Mobility Assessment/Treatment    Bed Mobility Assessment/Treatment  supine-sit;sit-supine  -MS     Supine-Sit [place] (Bed Mobility)  contact guard  -MS     Sit-Supine [place] (Bed Mobility)  contact guard  -MS     Row Name 05/06/19 0956          Transfer Assessment/Treatment    Transfer Assessment/Treatment  sit-stand transfer;stand-sit transfer  -MS     Sit-Stand [place] (Transfers)  contact guard;2 person assist  -MS     Stand-Sit [place] (Transfers)  contact guard;2 person assist  -MS     Row Name 05/06/19 0956          Sit-Stand Transfer    Assistive Device (Sit-Stand Transfers)  walker, front-wheeled  -MS     Row Name 05/06/19 0956          Stand-Sit Transfer    Assistive Device (Stand-Sit Transfers)  walker, front-wheeled  -MS     Row Name 05/06/19 0956          Gait/Stairs Assessment/Training    [place] Level (Gait)  minimum assist (75% patient effort)  -MS     Assistive Device (Gait)  walker, front-wheeled  -MS     Distance in Feet (Gait)  35 feet  -MS     Pattern  "(Gait)  step-through  -MS     Bilateral Gait Deviations  forward flexed posture  -MS     Comment (Gait/Stairs)  Pt. reports feeling \"light headed\" when upright, limiting his overall gait distance this AM.  Verbal/tactile cues for posture correction.  -MS     Row Name 05/06/19 0956          General ROM    GENERAL ROM COMMENTS  BUE/LE (WFL's)  -MS     Row Name 05/06/19 0956          MMT (Manual Muscle Testing)    General MMT Comments  BUE/LE (4-/5)  -MS     Row Name 05/06/19 0956          Therapeutic Exercise    Comment (Therapeutic Exercise)  BLE ther. ex. program x 10 reps completed (Ankle pumps, LAQ's, Hip Flexion)  -MS     Row Name 05/06/19 0956          Pain Assessment    Additional Documentation  Pain Scale: Numbers Pre/Post-Treatment (Group)  -MS     Row Name 05/06/19 0956          Pain Scale: Numbers Pre/Post-Treatment    Pain Scale: Numbers, Pretreatment  0/10 - no pain  -MS     Pain Scale: Numbers, Post-Treatment  0/10 - no pain  -MS     Row Name 05/06/19 0956          Physical Therapy Clinical Impression    Date of Referral to PT  05/05/19  -MS     Criteria for Skilled Interventions Met (PT Clinical Impression)  treatment indicated  -MS     Rehab Potential (PT Clinical Summary)  good, to achieve stated therapy goals  -MS     Row Name 05/06/19 0956          Physical Therapy Goals    Bed Mobility Goal Selection (PT)  bed mobility, PT goal 1  -MS     Transfer Goal Selection (PT)  transfer, PT goal 1  -MS     Gait Training Goal Selection (PT)  gait training, PT goal 1  -MS     Row Name 05/06/19 0956          Bed Mobility Goal 1 (PT)    Activity/Assistive Device (Bed Mobility Goal 1, PT)  bed mobility activities, all  -MS     [place] Level/Cues Needed (Bed Mobility Goal 1, PT)  independent  -MS     Time Frame (Bed Mobility Goal 1, PT)  long term goal (LTG);5 days  -MS     Row Name 05/06/19 0956          Transfer Goal 1 (PT)    Activity/Assistive Device (Transfer Goal 1, PT)  transfers, all;walker, rolling  " -MS     [place] Level/Cues Needed (Transfer Goal 1, PT)  independent  -MS     Time Frame (Transfer Goal 1, PT)  long term goal (LTG);5 days  -MS     Row Name 05/06/19 0956          Gait Training Goal 1 (PT)    Activity/Assistive Device (Gait Training Goal 1, PT)  gait (walking locomotion);walker, rolling  -MS     [place] Level (Gait Training Goal 1, PT)  independent  -MS     Distance (Gait Goal 1, PT)  120 feet  -MS     Time Frame (Gait Training Goal 1, PT)  long term goal (LTG);5 days  -MS     Row Name 05/06/19 0956          Positioning and Restraints    Pre-Treatment Position  in bed  -MS     Post Treatment Position  bed  -MS     In Bed  notified nsg;supine;call light within reach;encouraged to call for assist;exit alarm on All lines intact.   -MS       User Key  (r) = Recorded By, (t) = Taken By, (c) = Cosigned By    Initials Name Provider Type    Garth Shirley PT Physical Therapist        Physical Therapy Education     Title: PT OT SLP Therapies (Done)     Topic: Physical Therapy (Done)     Point: Mobility training (Done)     Learning Progress Summary           Patient Acceptance, E,D, VU,NR by MS at 5/6/2019 10:02 AM                   Point: Home exercise program (Done)     Learning Progress Summary           Patient Acceptance, E,D, VU,NR by MS at 5/6/2019 10:02 AM                   Point: Body mechanics (Done)     Learning Progress Summary           Patient Acceptance, E,D, VU,NR by MS at 5/6/2019 10:02 AM                   Point: Precautions (Done)     Learning Progress Summary           Patient Acceptance, E,D, VU,NR by MS at 5/6/2019 10:02 AM                               User Key     Initials Effective Dates Name Provider Type Discipline    MS 04/03/18 -  Garth Rios PT Physical Therapist PT              PT Recommendation and Plan  Anticipated Discharge Disposition (PT): home with assist, home with home health, skilled nursing facility(Pending pt. progress)  Planned Therapy  Interventions (PT Eval): balance training, bed mobility training, gait training, home exercise program, patient/family education, postural re-education, strengthening, transfer training  Therapy Frequency (PT Clinical Impression): daily  Outcome Summary/Treatment Plan (PT)  Anticipated Discharge Disposition (PT): home with assist, home with home health, skilled nursing facility(Pending pt. progress)  Plan of Care Reviewed With: patient  Outcome Summary: Pt. will benefit from skilled inpt. P.T. to address his functional deficits and to assist pt. in regaining his maximum level of independence with functional mobility.   Outcome Measures     Row Name 05/06/19 1000             How much help from another person do you currently need...    Turning from your back to your side while in flat bed without using bedrails?  4  -MS      Moving from lying on back to sitting on the side of a flat bed without bedrails?  3  -MS      Moving to and from a bed to a chair (including a wheelchair)?  3  -MS      Standing up from a chair using your arms (e.g., wheelchair, bedside chair)?  3  -MS      Climbing 3-5 steps with a railing?  2  -MS      To walk in hospital room?  3  -MS      AM-PAC 6 Clicks Score  18  -MS         Functional Assessment    Outcome Measure Options  AM-PAC 6 Clicks Basic Mobility (PT)  -MS        User Key  (r) = Recorded By, (t) = Taken By, (c) = Cosigned By    Initials Name Provider Type    Garth Shirley, PT Physical Therapist         Time Calculation:   PT Charges     Row Name 05/06/19 1003             Time Calculation    Start Time  0930  -MS      Stop Time  0947  -MS      Time Calculation (min)  17 min  -MS      PT Received On  05/06/19  -MS      PT - Next Appointment  05/07/19  -MS      PT Goal Re-Cert Due Date  05/11/19  -MS         Time Calculation- PT    Total Timed Code Minutes- PT  14 minute(s)  -MS        User Key  (r) = Recorded By, (t) = Taken By, (c) = Cosigned By    Initials Name Provider Type     Garth Shirley, PT Physical Therapist        Therapy Charges for Today     Code Description Service Date Service Provider Modifiers Qty    74290019775 HC PT EVAL LOW COMPLEXITY 1 5/6/2019 Garth Rios, PT GP 1    17448435655 HC PT THER PROC EA 15 MIN 5/6/2019 Garth Rios, PT GP 1    09155493665 HC PT THER SUPP EA 15 MIN 5/6/2019 Garth Rios, PT GP 1          PT G-Codes  Outcome Measure Options: AM-PAC 6 Clicks Basic Mobility (PT)  AM-PAC 6 Clicks Score: 18      Garth Rios, PT  5/6/2019

## 2019-05-06 NOTE — H&P
Patient Name:  Andreas Rubin  YOB: 1950  MRN:  [id number]  Admit Date:  5/5/2019  Patient Care Team:  Luis Dsouza Jr., MD as PCP - General (Family Medicine)  Helio Carpenter MD as PCP - Claims Attributed  Garth Arias MD as Consulting Physician (Vascular Surgery)  Regino James MD as Consulting Physician (Cardiology)  Drew Wade MD as Consulting Physician (Ophthalmology)  Jim Irene DO as Consulting Physician (Neurology)  Adan Barnes MD as Consulting Physician (Pulmonary Disease)  Helio Carpenter MD as Consulting Physician (Endocrinology)      Chief Complaint   Patient presents with   • Weakness - Generalized       Subjective     Mr. Rubin is a 68 y.o. male with a history of a fib, CHF, CAD, DM2, HLD, HTN, cardiomyopathy, PALMER that presents to [facility] complaining of falls today. Patient has history of intellectual disability and brother at bedside to assist in history currently. Patient reports that he fell twice today, brother reporting once he fell out of his chair and the second time fell off the toilet. Patient unsure cause of fall but states that he did become weak and lightheaded, though denies loss of consciousness or hitting his head with either fall. Patient does have AICD in place and was just seen at cardiologist on Friday and it was interrogated at that time. Patient's brother reports that patient has been sleeping quite a bit recently, both yesterday and today, but patient denies other complaints at this time. Patient denies fever, chills, chest pain, shortness of breath, abdominal pain, changes in bowel or bladder habits, worsening edema. He does have history of CHF and takes diuretics, but does not appear fluid overloaded at this time. Of note, patient's brother at bedside reports that patient was experiencing lots of falls last summer, physical therapy began working with him and things improved. He reports that he was  "seen in ED at some point and diagnosis changed to \"chronic fatigue\" and states that physical therapy stopped coming to work with patient because of this diagnosis.     History of Present Illness    Past Medical History:   Diagnosis Date   • Allergic rhinitis    • ASHD (arteriosclerotic heart disease)    • Atrial fibrillation (CMS/HCC)    • AV block    • Cardiomyopathy (CMS/HCC)    • Cerebellar stroke (CMS/HCC)    • CHF (congestive heart failure) (CMS/HCC)    • Coronary artery disease    • Depression    • DM type 2 (diabetes mellitus, type 2) (CMS/[facility])    • Encounter for special screening examination for neoplasm of prostate    • Fatigue    • Hot flashes    • Hyperlipidemia    • Hypertension    • Hypotension    • Imbalance    • Intermittent claudication (CMS/HCC)    • Ischemic cardiomyopathy    • PALMER (obstructive sleep apnea)    • S/P angioplasty with stent    • S/P biventricular cardiac pacemaker procedure    • Sleep apnea      Past Surgical History:   Procedure Laterality Date   • BUNIONECTOMY      BILATERAL FEET   • CARDIAC CATHETERIZATION Left 06/16/2011    [facility], Dr. Walt Saab, coronaries and LV gram   • CARDIOVERSION  08/20/2009    [facility], Dr. James, cardioverison of atrial flutter to sinus rhythm, reprogramming of pacemaker   • CAROTID STENT      EF=10-15%LEFT MAIN NORMAL , MILD LUMINAL IRR OF LED AND 20% DISEASE OF THE CIRCUMFLEX 80% LESIONIN THE PROX RCA THAT WAS ACTUALLY NONDOMINANT 2.5 X 18 MM VISION BARE METAL STENT IN THE MID RCA    • CATARACT EXTRACTION Bilateral    • COLONOSCOPY N/A 3/3/2017    Procedure: COLONOSCOPY TO CECUM;  Surgeon: Benton Castellanos MD;  Location: [facility] ENDOSCOPY;  Service:    • CORONARY ANGIOPLASTY WITH STENT PLACEMENT  06/16/2011    EF = 10-15%.  Left main was normal, mild luminal irregularities of LAD, and 20% disease of the circumflex.  80% lesion in the prox RCA that was actually nondominant.  2.5 x 18mm Vision bare metal stent " in the mid RCA.   • ELBOW PROCEDURE      REMOVAL OF NODULE ON LEFT ELBOW   • KNEE ACL RECONSTRUCTION      LEFT KNEE   • [facility]   • PACEMAKER REPLACEMENT  11/12/2015     Family History   Problem Relation Age of Onset   • Lung cancer Mother    • Stroke Father    • Heart attack Father    • Lung cancer Father    • Diabetes Brother    • Hyperlipidemia Brother    • Diabetes Brother    • Throat cancer Brother      Social History     Tobacco Use   • Smoking status: Never Smoker   • Smokeless tobacco: Never Used   • Tobacco comment: caffeine use   Substance Use Topics   • Alcohol use: Yes     Comment: ocasional   • Drug use: No       (Not in a hospital admission)  Allergies:    Allergies   Allergen Reactions   • Codeine Diarrhea     diarrhea   • Codeine Sulfate        Review of Systems   Constitutional: Negative for appetite change, chills and fever.   HENT: Negative.  Negative for congestion and sore throat.    Eyes: Negative.  Negative for visual disturbance.   Respiratory: Negative.  Negative for cough and shortness of breath.    Cardiovascular: Positive for leg swelling. Negative for chest pain and palpitations.   Gastrointestinal: Negative.  Negative for abdominal pain, constipation, diarrhea, nausea and vomiting.   Endocrine: Negative.    Genitourinary: Negative.  Negative for dysuria, frequency and urgency.   Musculoskeletal: Negative.  Negative for arthralgias and myalgias.   Skin: Negative.  Negative for color change, pallor, rash and wound.   Allergic/Immunologic: Negative.    Neurological: Positive for weakness and light-headedness. Negative for dizziness.   Hematological: Negative.    Psychiatric/Behavioral: Negative.  Negative for agitation, behavioral problems, confusion and decreased concentration.        Objective    Vital Signs  Temp:  [96.6 °F (35.9 °C)] 96.6 °F (35.9 °C)  Heart Rate:  [68-76] 70  Resp:  [18] 18  BP: (110-133)/(75-99)  119/80  SpO2:  [92 %-100 %] 92 %  on   ;      Body mass index is 27.47 kg/m².    Physical Exam   Constitutional: He is oriented to person, place, and time. He appears well-developed and well-nourished. No distress.   HENT:   Head: Normocephalic and atraumatic.   Eyes: EOM are normal. Pupils are equal, round, and reactive to light.   Neck: Normal range of motion. Neck supple.   Cardiovascular: Intact distal pulses. An irregularly irregular rhythm present.   Murmur heard.  Pulmonary/Chest: Effort normal. No respiratory distress. He has decreased breath sounds in the right lower field and the left lower field.   Abdominal: Soft. Bowel sounds are normal. He exhibits no distension. There is no tenderness.   Musculoskeletal: Normal range of motion. He exhibits edema (mild BLE edema). He exhibits no tenderness.   Neurological: He is alert and oriented to person, place, and time.   Skin: Skin is warm and dry. He is not diaphoretic.   Psychiatric: He has a normal mood and affect. His behavior is normal.   Nursing note and vitals reviewed.      Results Review:   I reviewed the patient's new clinical results including all labs and xrays.    Lab Results (last 24 hours)     Procedure Component Value Units Date/Time    POC Glucose Once [[id number]]  (Abnormal) Collected:  05/05/19 2132    Specimen:  Blood Updated:  05/05/19 2133     Glucose 138 mg/dL     CBC & Differential [[id number]] Collected:  05/05/19 2133    Specimen:  Blood Updated:  05/05/19 2140    Narrative:       The following orders were created for panel order CBC & Differential.  Procedure                               Abnormality         Status                     ---------                               -----------         ------                     CBC Auto Differential[[id number]]        Abnormal            Final result                 Please view results for these tests on the individual orders.    Comprehensive Metabolic Panel [[id number]]  (Abnormal) Collected:   05/05/19 2133    Specimen:  Blood from Arm, Right Updated:  05/05/19 2205     Glucose 153 mg/dL      BUN 13 mg/dL      Creatinine 1.22 mg/dL      Sodium 139 mmol/L      Potassium 4.1 mmol/L      Chloride 97 mmol/L      CO2 23.9 mmol/L      Calcium 8.9 mg/dL      Total Protein 7.6 g/dL      Albumin 3.90 g/dL      ALT (SGPT) 40 U/L      AST (SGOT) 63 U/L      Comment: Specimen hemolyzed.  Results may be affected.        Alkaline Phosphatase 234 U/L      Total Bilirubin 2.5 mg/dL      eGFR Non African Amer 59 mL/min/1.73      Globulin 3.7 gm/dL      A/G Ratio 1.1 g/dL      BUN/Creatinine Ratio 10.7     Anion Gap 18.1 mmol/L     Narrative:       GFR Normal >60  Chronic Kidney Disease <60  Kidney Failure <15    Troponin [[id number]]  (Normal) Collected:  05/05/19 2133    Specimen:  Blood from Arm, Right Updated:  05/05/19 2204     Troponin T 0.028 ng/mL     Narrative:       Troponin T Reference Range:  <= 0.03 ng/mL-   Negative for AMI  >0.03 ng/mL-     Abnormal for myocardial necrosis.  Clinicians would have to utilize clinical acumen, EKG, Troponin and serial changes to determine if it is an Acute Myocardial Infarction or myocardial injury due to an underlying chronic condition.     BNP [[id number]]  (Abnormal) Collected:  05/05/19 2133    Specimen:  Blood from Arm, Right Updated:  05/05/19 2201     proBNP 6,017.0 pg/mL     Narrative:       Among patients with dyspnea, NT-proBNP is highly sensitive for the detection of acute congestive heart failure. In addition NT-proBNP of <300 pg/ml effectively rules out acute congestive heart failure with 99% negative predictive value.    Magnesium [[id number]]  (Normal) Collected:  05/05/19 2133    Specimen:  Blood from Arm, Right Updated:  05/05/19 2204     Magnesium 2.4 mg/dL     CK [[id number]]  (Abnormal) Collected:  05/05/19 2133    Specimen:  Blood from Arm, Right Updated:  05/05/19 2204     Creatine Kinase 310 U/L     CBC Auto Differential [[id number]]  (Abnormal) Collected:   05/05/19 2133    Specimen:  Blood from Arm, Right Updated:  05/05/19 2140     WBC 4.44 10*3/mm3      RBC 5.15 10*6/mm3      Hemoglobin 16.1 g/dL      Hematocrit 47.7 %      MCV 92.6 fL      MCH 31.3 pg      MCHC 33.8 g/dL      RDW 19.5 %      RDW-SD 65.1 fl      MPV 12.2 fL      Platelets 146 10*3/mm3      Neutrophil % 67.5 %      Lymphocyte % 14.9 %      Monocyte % 14.9 %      Eosinophil % 1.4 %      Basophil % 1.1 %      Immature Grans % 0.2 %      Neutrophils, Absolute 3.00 10*3/mm3      Lymphocytes, Absolute 0.66 10*3/mm3      Monocytes, Absolute 0.66 10*3/mm3      Eosinophils, Absolute 0.06 10*3/mm3      Basophils, Absolute 0.05 10*3/mm3      Immature Grans, Absolute 0.01 10*3/mm3      nRBC 0.0 /100 WBC     Urinalysis With Microscopic If Indicated (No Culture) - Urine, Clean Catch [[id number]]  (Abnormal) Collected:  05/05/19 2148    Specimen:  Urine, Clean Catch Updated:  05/05/19 2202     Color, UA Yellow     Appearance, UA Clear     pH, UA <=5.0     Specific Gravity, UA 1.014     Glucose, UA Negative     Ketones, UA Negative     Bilirubin, UA Negative     Blood, UA Moderate (2+)     Protein, UA 30 mg/dL (1+)     Leuk Esterase, UA Negative     Nitrite, UA Negative     Urobilinogen, UA 1.0 E.U./dL    Urinalysis, Microscopic Only - Urine, Clean Catch [[id number]] Collected:  05/05/19 2148    Specimen:  Urine, Clean Catch Updated:  05/05/19 2227     RBC, UA 0-2 /HPF      WBC, UA 0-2 /HPF      Bacteria, UA None Seen /HPF      Squamous Epithelial Cells, UA 0-2 /HPF      Hyaline Casts, UA 3-6 /LPF      Fine Granular Casts, UA 3-6 /LPF      Methodology Manual Light Microscopy          CT Head Without Contrast   Final Result   No acute intracranial process identified.       Radiation dose reduction techniques were utilized, including automated   exposure control and exposure modulation based on body size.       This report was finalized on 5/5/2019 10:39 PM by Dr. Mell Muse M.D.            Assessment/Plan       Active Hospital Problems    Diagnosis  POA   • **Near syncope [R55]  Yes   • Elevated liver function tests [R94.5]  Yes   • Falls [W19.XXXA]  Yes   • Type 2 diabetes mellitus with peripheral neuropathy (CMS/HCC) [E11.42]  Yes   • Paroxysmal atrial fibrillation (CMS/HCC) [I48.0]  Yes   • Sleep apnea [G47.30]  Yes   • Hyperlipidemia [E78.5]  Yes   • Essential hypertension [I10]  Yes   • Coronary artery disease involving native coronary artery of native heart without angina pectoris [I25.10]  Yes      Resolved Hospital Problems   No resolved problems to display.     Near syncope  -2 falls today resulting from weakness and lightheadedness, no LOC, no further injuries  -reported similar episode last year, improvement with PT  -pacemaker/AICD in place, recently interrogated and awaiting interrogation done in ED tonight  -cardiology consult  -CCP consult regarding potential placement  -PT to see    DM2  -hold home dose metformin, continue long acting insulin  -low dose SSI for meal coverage  -a1c in AM    PAF/PALMER/HTN/HLD/CHF  -stable, continue home dose medications for now    VTE Ppx  -SCDs    CODE status  -full    I discussed the patients findings and my recommendations with patient and family.    LAM Ornelas  [facility]  05/05/19  11:04 PM

## 2019-05-06 NOTE — TELEPHONE ENCOUNTER
The pt's Brother called to let you know that he fell twice yesterday and was taken to [facility] where he is now.

## 2019-05-06 NOTE — ED PROVIDER NOTES
EMERGENCY DEPARTMENT ENCOUNTER    CHIEF COMPLAINT  Chief Complaint: generalized weakness  History given by: patient and brother  History limited by: intellectual disability   Room Number: 13/13  PMD: Luis Dsouza Jr., MD      HPI:  Pt is a 68 y.o. male who presents with hx of intellectual disability via EMS with report of two mechanical falls PTA. Pt states during the latest fall he was in the bathroom and fell getting off the toilet. Brother at bedside states that pt was in the bathroom for greater the 20 minutes and went to check on him. He states that he checked on the pt who stated he was fine and when he rechecked on him he was on the floor. Brother states pt has had sleeping for large amounts of time for the past 3 days. Brother states pt was getting out of his chair earlier today and fell getting out of the chair. They state that pt has increasing generalized weakness and poor balance. Brother states pt was admitted in 12/2018 and that he was diagnosed with chronic fatigue. They state that pt has not been wearing his CPAP mask as he should be. Pt and family state BLE redness is improved from previous. Pt has hx of frequent falls. Pt denies fever, N/V/D, constipation, bloody stools, cough, cp, abd pain, SOA, BUE pain, BLE pain, and urinary sx.  Friends deny LOC and known head injury although fall in the bathroom today was unwitnessed. They did state that pt laid on the ground for 30 minutes before they  called EMS. Pt is supposed to use a walker but states that he does not like to use it. They deny medication changes, missed doses and hx of seizures.         Duration:  One day  Onset: gradual  Timing: constant  Location: diffuse  Quality: mechanical falls  Intensity/Severity: moderate  Progression: unchanged  Associated Symptoms: generalized weakness, poor balance, increasing sleep for long periods  Treatment before arrival: EMS care and treatment    PAST MEDICAL HISTORY  Active Ambulatory Problems      Diagnosis Date Noted   • Type 2 diabetes mellitus with complication, with long-term current use of insulin (CMS/HCC) 08/10/2016   • Type 2 diabetes mellitus with diabetic retinopathy (CMS/HCC) 08/10/2016   • Hyperlipidemia 08/10/2016   • Essential hypertension 08/10/2016   • Coronary artery disease involving native coronary artery of native heart without angina pectoris 08/10/2016   • S/P angioplasty with stent 08/10/2016   • Sleep apnea 08/10/2016   • Right-sided carotid artery disease (CMS/HCC) 07/03/2017   • Hypotension, postural 07/06/2017   • Cardiomyopathy (CMS/HCC) 07/06/2017   • Paroxysmal atrial fibrillation (CMS/HCC) 07/06/2017   • S/P biventricular cardiac pacemaker procedure 07/06/2017   • Falls 10/18/2017   • Dizziness 10/18/2017   • History of cerebellar stroke 12/14/2017   • Cerebellar ataxia (CMS/HCC) 12/21/2017   • Type 2 diabetes mellitus with peripheral neuropathy (CMS/HCC) 05/03/2018   • Serum potassium elevated 10/16/2018     Resolved Ambulatory Problems     Diagnosis Date Noted   • No Resolved Ambulatory Problems     Past Medical History:   Diagnosis Date   • Allergic rhinitis    • ASHD (arteriosclerotic heart disease)    • Atrial fibrillation (CMS/HCC)    • AV block    • Cardiomyopathy (CMS/HCC)    • Cerebellar stroke (CMS/HCC)    • CHF (congestive heart failure) (CMS/HCC)    • Coronary artery disease    • Depression    • DM type 2 (diabetes mellitus, type 2) (CMS/HCC)    • Encounter for special screening examination for neoplasm of prostate    • Fatigue    • Hot flashes    • Hyperlipidemia    • Hypertension    • Hypotension    • Imbalance    • Intermittent claudication (CMS/HCC)    • Ischemic cardiomyopathy    • PALMER (obstructive sleep apnea)    • S/P angioplasty with stent    • S/P biventricular cardiac pacemaker procedure    • Sleep apnea        PAST SURGICAL HISTORY  Past Surgical History:   Procedure Laterality Date   • BUNIONECTOMY      BILATERAL FEET   • CARDIAC CATHETERIZATION Left  06/16/2011    [facility], Dr. Walt Saab, coronaries and LV gram   • CARDIOVERSION  08/20/2009    [facility], Dr. James, cardioverison of atrial flutter to sinus rhythm, reprogramming of pacemaker   • CAROTID STENT      EF=10-15%LEFT MAIN NORMAL , MILD LUMINAL IRR OF LED AND 20% DISEASE OF THE CIRCUMFLEX 80% LESIONIN THE PROX RCA THAT WAS ACTUALLY NONDOMINANT 2.5 X 18 MM VISION BARE METAL STENT IN THE MID RCA    • CATARACT EXTRACTION Bilateral    • COLONOSCOPY N/A 3/3/2017    Procedure: COLONOSCOPY TO CECUM;  Surgeon: Benton Castellanos MD;  Location: [facility] ENDOSCOPY;  Service:    • CORONARY ANGIOPLASTY WITH STENT PLACEMENT  06/16/2011    EF = 10-15%.  Left main was normal, mild luminal irregularities of LAD, and 20% disease of the circumflex.  80% lesion in the prox RCA that was actually nondominant.  2.5 x 18mm Vision bare metal stent in the mid RCA.   • ELBOW PROCEDURE      REMOVAL OF NODULE ON LEFT ELBOW   • KNEE ACL RECONSTRUCTION      LEFT KNEE   • [facility]   • PACEMAKER REPLACEMENT  11/12/2015       FAMILY HISTORY  Family History   Problem Relation Age of Onset   • Lung cancer Mother    • Stroke Father    • Heart attack Father    • Lung cancer Father    • Diabetes Brother    • Hyperlipidemia Brother    • Diabetes Brother    • Throat cancer Brother        SOCIAL HISTORY  Social History     Socioeconomic History   • Marital status: Single     Spouse name: Not on file   • Number of children: Not on file   • Years of education: Not on file   • Highest education level: Not on file   Tobacco Use   • Smoking status: Never Smoker   • Smokeless tobacco: Never Used   • Tobacco comment: caffeine use   Substance and Sexual Activity   • Alcohol use: Yes     Comment: ocasional   • Drug use: No       ALLERGIES  Codeine and Codeine sulfate    REVIEW OF SYSTEMS  Review of Systems   Constitutional: Positive for activity  change (increasing sleep) and fatigue (chronic). Negative for chills and fever.        (+) poor balance  (+) mechanical falls x2.    HENT: Negative for sore throat and trouble swallowing.    Eyes: Negative for visual disturbance.   Respiratory: Negative for cough and shortness of breath.    Cardiovascular: Negative for chest pain and leg swelling.   Gastrointestinal: Negative for abdominal pain, diarrhea and vomiting.   Endocrine: Negative.    Genitourinary: Negative for decreased urine volume and frequency.   Musculoskeletal: Negative for neck pain.   Skin: Negative for rash.   Allergic/Immunologic: Negative.    Neurological: Positive for weakness (generalized). Negative for numbness.   Hematological: Negative.    Psychiatric/Behavioral: Negative.    All other systems reviewed and are negative.      PHYSICAL EXAM  ED Triage Vitals [05/05/19 2056]   Temp Heart Rate Resp BP SpO2   96.6 °F (35.9 °C) 68 18 128/95 97 %      Temp src Heart Rate Source Patient Position BP Location FiO2 (%)   Tympanic Monitor -- -- --       Physical Exam   Constitutional: He is oriented to person, place, and time. He appears distressed.   HENT:   Head: Normocephalic and atraumatic.   Eyes: EOM are normal.   Neck: Normal range of motion.   Cardiovascular: Normal rate and intact distal pulses. An irregular rhythm present.   Murmur heard.   Systolic murmur is present with a grade of 3/6.  Pulses:       Posterior tibial pulses are 2+ on the right side, and 2+ on the left side.   Pulmonary/Chest: Effort normal and breath sounds normal. No respiratory distress. He has no wheezes.   Abdominal: Soft. Bowel sounds are normal. There is no tenderness. There is no rebound and no guarding.   Musculoskeletal: Normal range of motion. He exhibits edema (1+ BLE).   Good cap refill bilateral feet  mild erythema to the LLE w/o no obvious cellulitis    Neurological: He is alert and oriented to person, place, and time.   Normal strength and sensation  bilaterally   Skin: Skin is warm and dry.   Psychiatric: Affect normal.   Nursing note and vitals reviewed.      LAB RESULTS  Lab Results (last 24 hours)     Procedure Component Value Units Date/Time    POC Glucose Once [[id number]]  (Abnormal) Collected:  05/05/19 2132    Specimen:  Blood Updated:  05/05/19 2133     Glucose 138 mg/dL     CBC & Differential [[id number]] Collected:  05/05/19 2133    Specimen:  Blood Updated:  05/05/19 2140    Narrative:       The following orders were created for panel order CBC & Differential.  Procedure                               Abnormality         Status                     ---------                               -----------         ------                     CBC Auto Differential[[id number]]        Abnormal            Final result                 Please view results for these tests on the individual orders.    Comprehensive Metabolic Panel [[id number]]  (Abnormal) Collected:  05/05/19 2133    Specimen:  Blood from Arm, Right Updated:  05/05/19 2205     Glucose 153 mg/dL      BUN 13 mg/dL      Creatinine 1.22 mg/dL      Sodium 139 mmol/L      Potassium 4.1 mmol/L      Chloride 97 mmol/L      CO2 23.9 mmol/L      Calcium 8.9 mg/dL      Total Protein 7.6 g/dL      Albumin 3.90 g/dL      ALT (SGPT) 40 U/L      AST (SGOT) 63 U/L      Comment: Specimen hemolyzed.  Results may be affected.        Alkaline Phosphatase 234 U/L      Total Bilirubin 2.5 mg/dL      eGFR Non African Amer 59 mL/min/1.73      Globulin 3.7 gm/dL      A/G Ratio 1.1 g/dL      BUN/Creatinine Ratio 10.7     Anion Gap 18.1 mmol/L     Narrative:       GFR Normal >60  Chronic Kidney Disease <60  Kidney Failure <15    Troponin [[id number]]  (Normal) Collected:  05/05/19 2133    Specimen:  Blood from Arm, Right Updated:  05/05/19 2204     Troponin T 0.028 ng/mL     Narrative:       Troponin T Reference Range:  <= 0.03 ng/mL-   Negative for AMI  >0.03 ng/mL-     Abnormal for myocardial necrosis.  Clinicians would have to  utilize clinical acumen, EKG, Troponin and serial changes to determine if it is an Acute Myocardial Infarction or myocardial injury due to an underlying chronic condition.     BNP [[id number]]  (Abnormal) Collected:  05/05/19 2133    Specimen:  Blood from Arm, Right Updated:  05/05/19 2201     proBNP 6,017.0 pg/mL     Narrative:       Among patients with dyspnea, NT-proBNP is highly sensitive for the detection of acute congestive heart failure. In addition NT-proBNP of <300 pg/ml effectively rules out acute congestive heart failure with 99% negative predictive value.    Magnesium [[id number]]  (Normal) Collected:  05/05/19 2133    Specimen:  Blood from Arm, Right Updated:  05/05/19 2204     Magnesium 2.4 mg/dL     CK [[id number]]  (Abnormal) Collected:  05/05/19 2133    Specimen:  Blood from Arm, Right Updated:  05/05/19 2204     Creatine Kinase 310 U/L     CBC Auto Differential [[id number]]  (Abnormal) Collected:  05/05/19 2133    Specimen:  Blood from Arm, Right Updated:  05/05/19 2140     WBC 4.44 10*3/mm3      RBC 5.15 10*6/mm3      Hemoglobin 16.1 g/dL      Hematocrit 47.7 %      MCV 92.6 fL      MCH 31.3 pg      MCHC 33.8 g/dL      RDW 19.5 %      RDW-SD 65.1 fl      MPV 12.2 fL      Platelets 146 10*3/mm3      Neutrophil % 67.5 %      Lymphocyte % 14.9 %      Monocyte % 14.9 %      Eosinophil % 1.4 %      Basophil % 1.1 %      Immature Grans % 0.2 %      Neutrophils, Absolute 3.00 10*3/mm3      Lymphocytes, Absolute 0.66 10*3/mm3      Monocytes, Absolute 0.66 10*3/mm3      Eosinophils, Absolute 0.06 10*3/mm3      Basophils, Absolute 0.05 10*3/mm3      Immature Grans, Absolute 0.01 10*3/mm3      nRBC 0.0 /100 WBC     Urinalysis With Microscopic If Indicated (No Culture) - Urine, Clean Catch [[id number]]  (Abnormal) Collected:  05/05/19 2148    Specimen:  Urine, Clean Catch Updated:  05/05/19 2202     Color, UA Yellow     Appearance, UA Clear     pH, UA <=5.0     Specific Gravity, UA 1.014     Glucose, UA Negative      Ketones, UA Negative     Bilirubin, UA Negative     Blood, UA Moderate (2+)     Protein, UA 30 mg/dL (1+)     Leuk Esterase, UA Negative     Nitrite, UA Negative     Urobilinogen, UA 1.0 E.U./dL    Urinalysis, Microscopic Only - Urine, Clean Catch [[id number]] Collected:  05/05/19 2148    Specimen:  Urine, Clean Catch Updated:  05/05/19 2227     RBC, UA 0-2 /HPF      WBC, UA 0-2 /HPF      Bacteria, UA None Seen /HPF      Squamous Epithelial Cells, UA 0-2 /HPF      Hyaline Casts, UA 3-6 /LPF      Fine Granular Casts, UA 3-6 /LPF      Methodology Manual Light Microscopy          I ordered the above labs and reviewed the results    RADIOLOGY  CT Head Without Contrast   Final Result   No acute intracranial process identified.       Radiation dose reduction techniques were utilized, including automated   exposure control and exposure modulation based on body size.       This report was finalized on 5/5/2019 10:39 PM by Dr. Mell Muse M.D.               I ordered the above noted radiological studies. Interpreted by radiologist. Reviewed by me in PACS.       PROCEDURES  Procedures    EKG          EKG time: 2126  Rhythm/Rate: ventricular paced with occasional beats that fail to capture 70s BPM  QRS, axis: wide complex     Interpreted Contemporaneously by me, independently viewed  Minimally changed compared to prior 11/12/15    PROGRESS AND CONSULTS     2106-Ordered lab work and EKG for further evaluation.     2130-Ordered CT head for further evaluation.     2224-Per tech with orthostatic vitals, pt went to 120s to 110s systolic BP. He states pt complained of weakness and was unable to stand independently.     2236-Placed call to [facility] for admission    2240-Discussed pt case with  ([facility]) who will admit pt for further evaluation and care.      2307-Rechecked pt. Pt is resting comfortably. Notified pt of slightly elevated liver function, CT head-negative acute, and EKG findings. Discussed the plan to admit  the pt for further evaluation and care through [facility]. Pt and family agree with the plan and all questions were addressed.      MEDICAL DECISION MAKING  Results were reviewed/discussed with the patient and they were also made aware of online access. Pt also made aware that some labs, such as cultures, will not be resulted during ER visit and follow up with PMD is necessary.     MDM  Number of Diagnoses or Management Options   Elevated liver function tests:    Frequent falls:    Generalized weakness:    Hyperglycemia:      Amount and/or Complexity of Data Reviewed  Clinical lab tests: reviewed and ordered (UA-negative  CK-310  glucose-153  BNP-6017  WBC-WNL)  Tests in the radiology section of CPT®:  reviewed and ordered (CT head-negative acute)  Tests in the medicine section of CPT®:  reviewed and ordered (See EKG procedure note)  Decide to obtain previous medical records or to obtain history from someone other than the patient: yes   Obtain history from someone other than the patient: yes (brother)  Review and summarize past medical records: yes (Pt has hx of cardio myopathy with a pacemaker. Pt is followed by  ([facility]). He also has chronic aFib. Pt has a stent in 2011. He has obstructive sleep apnea and uses a C-PAP. Pt had a pacemaker interrogation on 5/3/19 which showed normal function. Pt is also followed by  (pulmonology). Pt had an echo done 3/19/19 which showed EF of 16%. Pt's pacemaker was replaced in 11/2015. )  Discuss the patient with other providers: yes ( ([facility]))  Independent visualization of images, tracings, or specimens: yes           DIAGNOSIS  Final diagnoses:   Elevated liver function tests   Frequent falls   Generalized weakness   Hyperglycemia       DISPOSITION  ADMISSION    Discussed treatment plan and reason for admission with pt/family and admitting physician.  Pt/family voiced understanding of the plan for admission for further testing/treatment as needed.          Latest Documented Vital Signs:  As of 11:08 PM  BP- 119/80 HR- 70 Temp- 96.6 °F (35.9 °C) (Tympanic) O2 sat- 92%    --  Documentation assistance provided by mariia Dominguez for MD Ludivina.  Information recorded by the scribe was done at my direction and has been verified and validated by me.               Barbara Dominguez  05/05/19 0501    Maria Fernanda Billingsley MD  05/07/19 [id number]

## 2019-05-06 NOTE — PLAN OF CARE
Problem: Patient Care Overview  Goal: Plan of Care Review  Outcome: Ongoing (interventions implemented as appropriate)   05/06/19 8049   Coping/Psychosocial   Plan of Care Reviewed With patient   OTHER   Outcome Summary no c/o pain, up with help, c/o dizziness x 1,potassium replaced recheck at 1800, ccp following, will continue to monitor    Plan of Care Review   Progress improving       Problem: Syncope (Adult)  Goal: Identify Related Risk Factors and Signs and Symptoms  Outcome: Ongoing (interventions implemented as appropriate)      Problem: Fluid Volume Excess (Adult)  Goal: Optimal Fluid Balance  Outcome: Ongoing (interventions implemented as appropriate)

## 2019-05-06 NOTE — PLAN OF CARE
Problem: Patient Care Overview  Goal: Plan of Care Review   05/06/19 1248   Coping/Psychosocial   Plan of Care Reviewed With patient   OTHER   Outcome Summary Pt seen for initial evaluation, recommend skilled OT services to increase safety and independence with performance of ADLs.

## 2019-05-06 NOTE — THERAPY EVALUATION
Acute Care - Occupational Therapy Initial Evaluation  [facility]     Patient Name: Andreas Rubin  : 1950  MRN: [id number]  Today's Date: 2019  Onset of Illness/Injury or Date of Surgery: 19     Referring Physician: Heidi Reyes    Admit Date: 2019       ICD-10-CM ICD-9-CM   1. Elevated liver function tests R94.5 790.6   2. Frequent falls R29.6 V15.88   3. Generalized weakness R53.1 780.79   4. Hyperglycemia R73.9 790.29   5. Muscle weakness (generalized) M62.81 728.87     Patient Active Problem List   Diagnosis   • Type 2 diabetes mellitus with complication, with long-term current use of insulin (CMS/HCC)   • Type 2 diabetes mellitus with diabetic retinopathy (CMS/HCC)   • Hyperlipidemia   • Essential hypertension   • Coronary artery disease involving native coronary artery of native heart without angina pectoris   • S/P angioplasty with stent   • Sleep apnea   • Right-sided carotid artery disease (CMS/HCC)   • Hypotension, postural   • Cardiomyopathy (CMS/HCC)   • Paroxysmal atrial fibrillation (CMS/HCC)   • S/P biventricular cardiac pacemaker procedure   • Falls   • Dizziness   • History of cerebellar stroke   • Cerebellar ataxia (CMS/HCC)   • Type 2 diabetes mellitus with peripheral neuropathy (CMS/HCC)   • Serum potassium elevated   • Elevated liver function tests   • Falls   • Near syncope     Past Medical History:   Diagnosis Date   • Allergic rhinitis    • ASHD (arteriosclerotic heart disease)    • Atrial fibrillation (CMS/HCC)    • AV block    • Cardiomyopathy (CMS/HCC)    • Cerebellar stroke (CMS/HCC)    • CHF (congestive heart failure) (CMS/HCC)    • Coronary artery disease    • Depression    • DM type 2 (diabetes mellitus, type 2) (CMS/HCC)    • Encounter for special screening examination for neoplasm of prostate    • Fatigue    • Hot flashes    • Hyperlipidemia    • Hypertension    • Hypotension    • Imbalance    • Intermittent claudication (CMS/HCC)    • Ischemic  "cardiomyopathy    • PALMER (obstructive sleep apnea)    • S/P angioplasty with stent    • S/P biventricular cardiac pacemaker procedure    • Sleep apnea      Past Surgical History:   Procedure Laterality Date   • BUNIONECTOMY      BILATERAL FEET   • CARDIAC CATHETERIZATION Left 06/16/2011    [facility], Dr. Walt Saab, coronaries and LV gram   • CARDIOVERSION  [facility]/20/2009    [facility] Dr. James, cardioverison of atrial flutter to sinus rhythm, reprogramming of pacemaker   • CAROTID STENT      EF=10-15%LEFT MAIN NORMAL , MILD LUMINAL IRR OF LED AND 20% DISEASE OF THE CIRCUMFLEX 80% LESIONIN THE PROX RCA THAT WAS ACTUALLY NONDOMINANT 2.5 X 18 MM VISION BARE METAL STENT IN THE MID RCA    • CATARACT EXTRACTION Bilateral    • COLONOSCOPY N/A 3/3/2017    Procedure: COLONOSCOPY TO CECUM;  Surgeon: Benton Castellanos MD;  Location: [facility] ENDOSCOPY;  Service:    • CORONARY ANGIOPLASTY WITH STENT PLACEMENT  06/16/2011    EF = 10-15%.  Left main was normal, mild luminal irregularities of LAD, and 20% disease of the circumflex.  80% lesion in the prox RCA that was actually nondominant.  2.5 x 18mm Vision bare metal stent in the mid RCA.   • ELBOW PROCEDURE      REMOVAL OF NODULE ON LEFT ELBOW   • KNEE ACL RECONSTRUCTION      LEFT KNEE   • [facility]   • PACEMAKER REPLACEMENT  11/12/2015          OT ASSESSMENT FLOWSHEET (last 12 hours)      Occupational Therapy Evaluation     Row Name 05/06/19 1002                   OT Evaluation Time/Intention    Subjective Information  complains of;fatigue  -MR        Document Type  evaluation  -MR        Mode of Treatment  occupational therapy  -MR        Patient Effort  good  -MR        Comment  pt reports feeling \"light headed\"  -MR           General Information    Patient Profile Reviewed?  yes  -MR        Onset of Illness/Injury or Date of Surgery  05/05/19  -MR        Patient " "Observations  alert;cooperative;agree to therapy  -MR        Patient/Family Observations  pt supine in bed, no acute distress  -MR        Prior Level of Function  independent:;ADL's  -MR        Equipment Currently Used at Home  walker, rolling  -MR        Pertinent History of Current Functional Problem  Pt. admitted with Near Syncope and h/o recent falls at home  -MR        Existing Precautions/Restrictions  fall  -MR        Benefits Reviewed  patient:  -MR        Barriers to Rehab  cognitive status  -MR           Cognitive Assessment/Intervention- PT/OT    Orientation Status (Cognition)  oriented to;person;place  -MR        Follows Commands (Cognition)  follows one step commands  -MR        Personal Safety Interventions  fall prevention program maintained;gait belt;nonskid shoes/slippers when out of bed;supervised activity  -MR           Bed Mobility Assessment/Treatment    Bed Mobility Assessment/Treatment  supine-sit;sit-supine  -MR        Supine-Sit [place] (Bed Mobility)  contact guard  -MR        Sit-Supine [place] (Bed Mobility)  contact guard  -MR           Functional Mobility    Functional Mobility- Comment  deferred, pt reports feeling \"light headed\" while seated EOB  -MR           ADL Assessment/Intervention    BADL Assessment/Intervention  lower body dressing;grooming;feeding  -MR           Lower Body Dressing Assessment/Training    Lower Body Dressing [place] Level  doff;don;socks;contact guard assist;verbal cues  -MR        Lower Body Dressing Position  edge of bed sitting  -MR           Grooming Assessment/Training    [place] Level (Grooming)  grooming skills;set up;supervision;verbal cues  -MR        Grooming Position  edge of bed sitting  -MR           Self-Feeding Assessment/Training    [place] Level (Feeding)  feeding skills;conditional independence  -MR           General ROM    GENERAL ROM COMMENTS  BUE WFL  -MR           MMT (Manual Muscle Testing)    General MMT " Comments  BUE > or = 4-/5  -MR           Motor Assessment/Interventions    Additional Documentation  Therapeutic Exercise (Group);Therapeutic Exercise Interventions (Group) UE coordination WFL for ADLs  -MR           Therapeutic Exercise    Upper Extremity Range of Motion (Therapeutic Exercise)  shoulder flexion/extension, left;shoulder flexion/extension, right;shoulder horizontal abduction/adduction, left;shoulder horizontal abduction/adduction, right;elbow flexion/extension, left;elbow flexion/extension, right;forearm supination/pronation, left;forearm supination/pronation, right  -MR        Hand (Therapeutic Exercise)  finger flexion/extension, left;finger flexion/extension, right  -MR        Exercise Type (Therapeutic Exercise)  AROM (active range of motion)  -MR        Position (Therapeutic Exercise)  seated  -MR        Expected Outcome (Therapeutic Exercise)  improve functional tolerance, self-care activity;improve performance, BADLs;improve performance, transfer skills  -MR           Positioning and Restraints    Pre-Treatment Position  in bed  -MR        Post Treatment Position  bed  -MR        In Bed  supine;notified nsg;call light within reach;encouraged to call for assist;exit alarm on  -MR           Pain Scale: Numbers Pre/Post-Treatment    Pain Scale: Numbers, Pretreatment  0/10 - no pain  -MR        Pain Scale: Numbers, Post-Treatment  0/10 - no pain  -MR           Clinical Impression (OT)    Criteria for Skilled Therapeutic Interventions Met (OT Eval)  yes;treatment indicated  -MR        Rehab Potential (OT Eval)  good, to achieve stated therapy goals  -MR        Therapy Frequency (OT Eval)  5 times/wk  -MR        Anticipated Discharge Disposition (OT)  skilled nursing facility;home with assist;home with home health  -MR           OT Goals    Transfer Goal Selection (OT)  transfer, OT goal 1  -MR        Grooming Goal Selection (OT)  grooming, OT goal 1  -MR        Functional Mobility Goal Selection  (OT)  functional mobility, OT goal 1  -MR        Additional Documentation  Functional Mobility Selection (OT) (Row);Grooming Goal Selection (OT) (Row)  -MR           Transfer Goal 1 (OT)    Activity/Assistive Device (Transfer Goal 1, OT)  toilet;bed-to-chair/chair-to-bed;walker, rolling  -MR        [place] Level/Cues Needed (Transfer Goal 1, OT)  supervision required  -MR        Time Frame (Transfer Goal 1, OT)  long term goal (LTG);by discharge  -MR           Grooming Goal 1 (OT)    Activity/Device (Grooming Goal 1, OT)  grooming skills, all standing at sink  -MR        [place] (Grooming Goal 1, OT)  supervision required  -MR        Time Frame (Grooming Goal 1, OT)  long term goal (LTG);by discharge  -MR           Functional Mobility Goal 1 (OT)    Activity/Assistive Device (Functional Mobility Goal 1, OT)  walker, rolling  -MR        [place] Level/Cues Needed (Functional Mobility Goal 1, OT)  supervision required  -MR        Distance Goal 1 (Functional Mobility, OT)  pt to perform functional mobility to the sink for ADLs  -MR        Time Frame (Functional Mobility Goal 1, OT)  long term goal (LTG);by discharge  -MR          User Key  (r) = Recorded By, (t) = Taken By, (c) = Cosigned By    Initials Name Effective Dates    Nadya Robbins Frances, OT 06/22/16 -          Occupational Therapy Education     Title: PT OT SLP Therapies (In Progress)     Topic: Occupational Therapy (In Progress)     Point: ADL training (Done)     Description: Instruct learner(s) on proper safety adaptation and remediation techniques during self care or transfers.   Instruct in proper use of assistive devices.    Learning Progress Summary           Patient Acceptance, E,TB, VU by MR at 5/6/2019 12:47 PM    Comment:  Pt educated on role of OT; OT POC.                               User Key     Initials Effective Dates Name Provider Type Discipline    MR 06/22/16 -  Nadya Brandt, OT Occupational Therapist OT                   OT Recommendation and Plan  Outcome Summary/Treatment Plan (OT)  Anticipated Discharge Disposition (OT): skilled nursing facility, home with assist, home with home health  Therapy Frequency (OT Eval): 5 times/wk  Plan of Care Review  Plan of Care Reviewed With: patient  Plan of Care Reviewed With: patient  Outcome Summary: Pt seen for initial evaluation, recommend skilled OT services to increase safety and independence with performance of ADLs.    Outcome Measures     Row Name 05/06/19 1200 05/06/19 1000          How much help from another person do you currently need...    Turning from your back to your side while in flat bed without using bedrails?  --  4  -MS     Moving from lying on back to sitting on the side of a flat bed without bedrails?  --  3  -MS     Moving to and from a bed to a chair (including a wheelchair)?  --  3  -MS     Standing up from a chair using your arms (e.g., wheelchair, bedside chair)?  --  3  -MS     Climbing 3-5 steps with a railing?  --  2  -MS     To walk in hospital room?  --  3  -MS     AM-PAC 6 Clicks Score  --  18  -MS        How much help from another is currently needed...    Putting on and taking off regular lower body clothing?  3  -MR  --     Bathing (including washing, rinsing, and drying)  2  -MR  --     Toileting (which includes using toilet bed pan or urinal)  3  -MR  --     Putting on and taking off regular upper body clothing  3  -MR  --     Taking care of personal grooming (such as brushing teeth)  3  -MR  --     Eating meals  3  -MR  --     Score  17  -MR  --        Functional Assessment    Outcome Measure Options  AM-PAC 6 Clicks Daily Activity (OT)  -MR  AM-PAC 6 Clicks Basic Mobility (PT)  -MS       User Key  (r) = Recorded By, (t) = Taken By, (c) = Cosigned By    Initials Name Provider Type    Garth Shirley, PT Physical Therapist    Nadya Robbins, OT Occupational Therapist          Time Calculation:   Time Calculation- OT     Row Name  05/06/19 1249             Time Calculation- OT    OT Start Time  0906  -MR      OT Stop Time  0922  -MR      OT Time Calculation (min)  16 min  -MR      Total Timed Code Minutes- OT  8 minute(s)  -MR      OT Received On  05/06/19  -MR      OT Goal Re-Cert Due Date  05/13/19  -MR        User Key  (r) = Recorded By, (t) = Taken By, (c) = Cosigned By    Initials Name Provider Type    Nadya Robbins OT Occupational Therapist        Therapy Charges for Today     Code Description Service Date Service Provider Modifiers Qty    [id number]  OT THER PROC EA 15 MIN 5/6/2019 Nadya Brandt, OT GO 1    [id number]  OT EVAL MOD COMPLEXITY 2 5/6/2019 Nadya Brandt OT GO 1               Nadya Brandt OT  5/6/2019

## 2019-05-06 NOTE — ED TRIAGE NOTES
To ER via EMS.  Pt fell earlier today and refused transport.  Pt fell again tonight.  Pt stood up from the toilet, became lightheaded and fell.  Pt has no complaints from fall.  Per brother/POA pt has been more weak today.  Otherwise pt is normal self. Pt states he has been lightheaded.

## 2019-05-06 NOTE — PROGRESS NOTES
Name: Andreas Rubin ADMIT: 2019   : 1950  PCP: Luis Dsouza Jr., MD    MRN: [id number] LOS: 0 days   AGE/SEX: 68 y.o. male  ROOM: [facility]     Subjective   Subjective   Feels okay.  No new complaints.     Objective   Objective   Vital Signs  Temp:  [96.6 °F (35.9 °C)-98.1 °F (36.7 °C)] 98.1 °F (36.7 °C)  Heart Rate:  [68-84] 70  Resp:  [16-20] 16  BP: (104-133)/(75-99) 104/85  SpO2:  [92 %-100 %] 94 %  on   ;   Device (Oxygen Therapy): room air  Body mass index is 26.96 kg/m².  Physical Exam   Constitutional: He is oriented to person, place, and time. No distress.   Cardiovascular: Normal rate and regular rhythm.   No murmur heard.  Pulmonary/Chest: Effort normal and breath sounds normal.   Abdominal: Soft. Bowel sounds are normal. He exhibits no distension. There is no tenderness.   Musculoskeletal: Normal range of motion. He exhibits no edema.   Neurological: He is alert and oriented to person, place, and time.   Skin: Skin is warm and dry. He is not diaphoretic.       Results Review:       I reviewed the patient's new clinical results.  Results from last 7 days   Lab Units 19  0538 19  2133   WBC 10*3/mm3 4.30 4.44   HEMOGLOBIN g/dL 15.0 16.1   PLATELETS 10*3/mm3 146 146     Results from last 7 days   Lab Units 19  0538 19  2133   SODIUM mmol/L 140 139   POTASSIUM mmol/L 3.3* 4.1   CHLORIDE mmol/L 97* 97*   CO2 mmol/L 30.3* 23.9   BUN mg/dL 12 13   CREATININE mg/dL 1.26 1.22   GLUCOSE mg/dL 134* 153*   Estimated Creatinine Clearance: 74.6 mL/min (by C-G formula based on SCr of 1.26 mg/dL).  Results from last 7 days   Lab Units 19  2133   ALBUMIN g/dL 3.90   BILIRUBIN mg/dL 2.5*   ALK PHOS U/L 234*   AST (SGOT) U/L 63*   ALT (SGPT) U/L 40     Results from last 7 days   Lab Units 19  0538 19  2133   CALCIUM mg/dL 8.5* 8.9   ALBUMIN g/dL  --  3.90   MAGNESIUM mg/dL  --  2.4       Hemoglobin A1C   Date/Time Value Ref Range Status   2019 0538 6.90 (H)  4.80 - 5.60 % Final     Glucose   Date/Time Value Ref Range Status   05/06/2019 1628 164 (H) 70 - 130 mg/dL Final   05/06/2019 1108 128 70 - 130 mg/dL Final   05/06/2019 0603 120 70 - 130 mg/dL Final   05/05/2019 2353 121 70 - 130 mg/dL Final   05/05/2019 2132 138 (H) 70 - 130 mg/dL Final         aspirin 81 mg Oral Daily   citalopram 20 mg Oral Daily   furosemide 40 mg Oral Daily   insulin glargine 20 Units Subcutaneous Q12H   insulin lispro 0-7 Units Subcutaneous 4x Daily With Meals & Nightly   potassium chloride 10 mEq Oral Daily   sodium chloride 3 mL Intravenous Q12H   spironolactone 25 mg Oral Daily      Diet Regular; Cardiac, Consistent Carbohydrate       Assessment/Plan     Active Hospital Problems    Diagnosis  POA   • **Near syncope [R55]  Yes   • Elevated liver function tests [R94.5]  Yes   • Falls [W19.XXXA]  Yes   • Type 2 diabetes mellitus with peripheral neuropathy (CMS/HCC) [E11.42]  Yes   • Paroxysmal atrial fibrillation (CMS/HCC) [I48.0]  Yes   • Sleep apnea [G47.30]  Yes   • Hyperlipidemia [E78.5]  Yes   • Essential hypertension [I10]  Yes   • Coronary artery disease involving native coronary artery of native heart without angina pectoris [I25.10]  Yes      Resolved Hospital Problems   No resolved problems to display.       Mr. Rubin is a 68 y.o. male with a history of intellectual disability and cerebral ataxia who has been admitted with recurrent falls.  He has severe nonischemic cardiomyopathy with EF 16%.    · I discussed with Dr. James who feels patient likely has end-stage cardiomyopathy.  Unfortunately there is not a lot that can be done.  Plan at this time is to stop his Coreg and statin.  · Chest x-ray ordered due to decreased breath sounds on the right.  · Physical therapy working with the patient.  · Case management involved regarding safe discharge planning.      Glynn Pak MD  [facility]  05/06/19  4:51 PM

## 2019-05-06 NOTE — PROGRESS NOTES
Continued Stay Note  [facility]     Patient Name: Andreas Rubin  MRN: [id number]  Today's Date: 5/6/2019    Admit Date: 5/5/2019    Discharge Plan     Row Name 05/06/19 1738       Plan    Plan  Home with brother     Plan Comments  Spoke with Nino and updated him. When asked, if pt is dc'd tomorrow what would the pts plan for dc be? Nino stated the pt would return home. Nino states they do not have money to private pay for rehab. Nino does want HH either. Per Nino they use an independent PT that Dr Dsouza referred them to and he would use him again if HH PT needed. CCP to follow. JChasteenRN/CCP                              Discharge Codes    No documentation.             Thi Issa RN

## 2019-05-06 NOTE — PLAN OF CARE
Problem: Patient Care Overview  Goal: Plan of Care Review   05/06/19 1002   Coping/Psychosocial   Plan of Care Reviewed With patient   OTHER   Outcome Summary Pt. will benefit from skilled inpt. P.T. to address his functional deficits and to assist pt. in regaining his maximum level of independence with functional mobility.

## 2019-05-06 NOTE — PROGRESS NOTES
Discharge Planning Assessment  [facility]     Patient Name: Andreas Rubin  MRN: [id number]  Today's Date: 5/6/2019    Admit Date: 5/5/2019    Discharge Needs Assessment     Row Name 05/06/19 1438       Living Environment    Lives With  sibling(s)    Current Living Arrangements  home/apartment/condo    Primary Care Provided by  self    Provides Primary Care For  no one, unable/limited ability to care for self    Family Caregiver if Needed  sibling(s)    Quality of Family Relationships  helpful;involved;supportive       Resource/Environmental Concerns    Transportation Concerns  car, none       Transition Planning    Transportation Anticipated  family or friend will provide       Discharge Needs Assessment    Readmission Within the Last 30 Days  no previous admission in last 30 days    Concerns to be Addressed  basic needs    Equipment Currently Used at Home  bipap/cpap;grab bar;walker, rolling    Equipment Needed After Discharge  none    Discharge Facility/Level of Care Needs  home with home health;nursing facility, intermediate        Discharge Plan     Row Name 05/06/19 1446       Plan    Plan  To be determined    Plan Comments  Spoke with pt and his brother Nino/POAMIE at bedside. Role of CCP explained. Facesheet info verified. Pharmacy verified. Pt denies any issues affording his medications. Nino oversees that the pt takes his medications. Pt has a Living Will/POA on file. Pt denies any history of [facility]. Pt has used [facility] in the past. Pt wears a CPAP at  provided by [place]. Pt lives with Nino in a S/S home with a basement with 3 steps to enter. Nino would like for the pt to go to [facility] at [place]. Explained to him the pt is in [place] and insurance wont cover the cost and rehab would be private pay. He stated they dont have the funds for rehab private pay. Pt has a LTC policy but has a 90day rule that they have to pay for LTC for the first 90 days then the LTC policy will pay after that. Nino is interested  in the pt going to [facility], called [facility], they do not have any beds available. Per [facility] if they did their cost for a private pay room would be $525.  CCP to follow. Thi Issa RN/DIMAS Issa RN

## 2019-05-07 LAB
GLUCOSE BLDC GLUCOMTR-MCNC: 104 MG/DL (ref 70–130)
GLUCOSE BLDC GLUCOMTR-MCNC: 124 MG/DL (ref 70–130)
GLUCOSE BLDC GLUCOMTR-MCNC: 65 MG/DL (ref 70–130)
GLUCOSE BLDC GLUCOMTR-MCNC: 69 MG/DL (ref 70–130)
GLUCOSE BLDC GLUCOMTR-MCNC: 95 MG/DL (ref 70–130)

## 2019-05-07 PROCEDURE — G0378 HOSPITAL OBSERVATION PER HR: HCPCS

## 2019-05-07 PROCEDURE — 99214 OFFICE O/P EST MOD 30 MIN: CPT | Performed by: INTERNAL MEDICINE

## 2019-05-07 PROCEDURE — 63710000001 INSULIN GLARGINE PER 5 UNITS: Performed by: NURSE PRACTITIONER

## 2019-05-07 PROCEDURE — 82962 GLUCOSE BLOOD TEST: CPT

## 2019-05-07 PROCEDURE — 97110 THERAPEUTIC EXERCISES: CPT

## 2019-05-07 PROCEDURE — 97535 SELF CARE MNGMENT TRAINING: CPT

## 2019-05-07 RX ADMIN — SPIRONOLACTONE 25 MG: 25 TABLET, FILM COATED ORAL at 08:12

## 2019-05-07 RX ADMIN — SODIUM CHLORIDE, PRESERVATIVE FREE 3 ML: 5 INJECTION INTRAVENOUS at 21:42

## 2019-05-07 RX ADMIN — SODIUM CHLORIDE, PRESERVATIVE FREE 3 ML: 5 INJECTION INTRAVENOUS at 08:12

## 2019-05-07 RX ADMIN — INSULIN GLARGINE 20 UNITS: 100 INJECTION, SOLUTION SUBCUTANEOUS at 21:41

## 2019-05-07 RX ADMIN — ASPIRIN 81 MG: 81 TABLET, DELAYED RELEASE ORAL at 08:12

## 2019-05-07 RX ADMIN — POTASSIUM CHLORIDE 10 MEQ: 750 CAPSULE, EXTENDED RELEASE ORAL at 08:12

## 2019-05-07 RX ADMIN — CITALOPRAM 20 MG: 20 TABLET, FILM COATED ORAL at 08:12

## 2019-05-07 RX ADMIN — INSULIN GLARGINE 20 UNITS: 100 INJECTION, SOLUTION SUBCUTANEOUS at 08:14

## 2019-05-07 RX ADMIN — FUROSEMIDE 40 MG: 40 TABLET ORAL at 08:12

## 2019-05-07 NOTE — THERAPY TREATMENT NOTE
Acute Care - Occupational Therapy Treatment Note  [facility]     Patient Name: Andreas Rubin  : 1950  MRN: [id number]  Today's Date: 2019  Onset of Illness/Injury or Date of Surgery: 19     Referring Physician: Heidi Reyes    Admit Date: 2019       ICD-10-CM ICD-9-CM   1. Elevated liver function tests R94.5 790.6   2. Frequent falls R29.6 V15.88   3. Generalized weakness R53.1 780.79   4. Hyperglycemia R73.9 790.29   5. Muscle weakness (generalized) M62.81 728.87     Patient Active Problem List   Diagnosis   • Type 2 diabetes mellitus with complication, with long-term current use of insulin (CMS/HCC)   • Type 2 diabetes mellitus with diabetic retinopathy (CMS/HCC)   • Hyperlipidemia   • Essential hypertension   • Coronary artery disease involving native coronary artery of native heart without angina pectoris   • S/P angioplasty with stent   • Sleep apnea   • Right-sided carotid artery disease (CMS/HCC)   • Hypotension, postural   • Cardiomyopathy (CMS/HCC)   • Paroxysmal atrial fibrillation (CMS/HCC)   • S/P biventricular cardiac pacemaker procedure   • Falls   • Dizziness   • History of cerebellar stroke   • Cerebellar ataxia (CMS/HCC)   • Type 2 diabetes mellitus with peripheral neuropathy (CMS/HCC)   • Serum potassium elevated   • Elevated liver function tests   • Falls   • Near syncope     Past Medical History:   Diagnosis Date   • Allergic rhinitis    • ASHD (arteriosclerotic heart disease)    • Atrial fibrillation (CMS/HCC)    • AV block    • Cardiomyopathy (CMS/HCC)    • Cerebellar stroke (CMS/HCC)    • CHF (congestive heart failure) (CMS/HCC)    • Coronary artery disease    • Depression    • DM type 2 (diabetes mellitus, type 2) (CMS/HCC)    • Encounter for special screening examination for neoplasm of prostate    • Fatigue    • Hot flashes    • Hyperlipidemia    • Hypertension    • Hypotension    • Imbalance    • Intermittent claudication (CMS/HCC)    • Ischemic  cardiomyopathy    • PALMER (obstructive sleep apnea)    • S/P angioplasty with stent    • S/P biventricular cardiac pacemaker procedure    • Sleep apnea      Past Surgical History:   Procedure Laterality Date   • BUNIONECTOMY      BILATERAL FEET   • CARDIAC CATHETERIZATION Left 06/16/2011    [facility], Dr. Walt Saab, coronaries and LV gram   • CARDIOVERSION  08/20/2009    [facility] [place], Dr. James, cardioverison of atrial flutter to sinus rhythm, reprogramming of pacemaker   • CAROTID STENT      EF=10-15%LEFT MAIN NORMAL , MILD LUMINAL IRR OF LED AND 20% DISEASE OF THE CIRCUMFLEX 80% LESIONIN THE PROX RCA THAT WAS ACTUALLY NONDOMINANT 2.5 X 18 MM VISION BARE METAL STENT IN THE MID RCA    • CATARACT EXTRACTION Bilateral    • COLONOSCOPY N/A 3/3/2017    Procedure: COLONOSCOPY TO CECUM;  Surgeon: Benton Castellanos MD;  Location: [facility] ENDOSCOPY;  Service:    • CORONARY ANGIOPLASTY WITH STENT PLACEMENT  06/16/2011    EF = 10-15%.  Left main was normal, mild luminal irregularities of LAD, and 20% disease of the circumflex.  80% lesion in the prox RCA that was actually nondominant.  2.5 x 18mm Vision bare metal stent in the mid RCA.   • ELBOW PROCEDURE      REMOVAL OF NODULE ON LEFT ELBOW   • KNEE ACL RECONSTRUCTION      LEFT KNEE   • PACEMAKER IMPLANTATION  11/12/2015    [facility], Dr. James   • PACEMAKER REPLACEMENT  11/12/2015       Therapy Treatment    Rehabilitation Treatment Summary     Row Name 05/07/19 1347             Treatment Time/Intention    Discipline  occupational therapist  -CW      Document Type  therapy note (daily note)  -CW      Subjective Information  complains of;fatigue  -CW      Mode of Treatment  occupational therapy  -CW      Patient/Family Observations  Pt. awake bed level. No pain c/o.  -CW      Patient Effort  good  -CW      Existing Precautions/Restrictions  fall  -CW      Recorded by [CW] Ghazala Yoder, OTJACKELINE 05/07/19 7842      Row Name  05/07/19 1345             Vital Signs    O2 Delivery Pre Treatment  room air  -CW      Intra SpO2 (%)  94  -CW      O2 Delivery Intra Treatment  room air  -CW      Post SpO2 (%)  96  -CW      Recorded by [CW] Ghazala Yoder OTR 05/07/19 1356      Row Name 05/07/19 1345             Cognitive Assessment/Intervention- PT/OT    Follows Commands (Cognition)  follows one step commands;75-90% accuracy  -CW      Personal Safety Interventions  fall prevention program maintained;gait belt;nonskid shoes/slippers when out of bed  -CW      Recorded by [CW] Ghazala Yoder OTR 05/07/19 1356      Row Name 05/07/19 1345             Safety Issues, Functional Mobility    Safety Issues Affecting Function (Mobility)  insight into deficits/self awareness;safety precaution awareness  -CW      Recorded by [CW] Ghazala Yoder OTR 05/07/19 1359      Row Name 05/07/19 1345             Bed Mobility Assessment/Treatment    Supine-Sit [place] (Bed Mobility)  verbal cues;contact guard  -CW      Sit-Supine [place] (Bed Mobility)  verbal cues;contact guard  -CW      Recorded by [CW] Ghazala Yoder OTR 05/07/19 1356      Row Name 05/07/19 1345             Transfer Assessment/Treatment    Transfer Assessment/Treatment  toilet transfer  -CW      Recorded by [CW] Ghazala Yoder OTR 05/07/19 1356      Row Name 05/07/19 1345             Sit-Stand Transfer    Sit-Stand [place] (Transfers)  verbal cues;contact guard  -CW      Assistive Device (Sit-Stand Transfers)  walker, standard  -CW      Recorded by [CW] Ghazala Yoder OTR 05/07/19 1356      Row Name 05/07/19 1345             Stand-Sit Transfer    Stand-Sit [place] (Transfers)  verbal cues;contact guard  -CW      Assistive Device (Stand-Sit Transfers)  walker, standard  -CW      Recorded by [CW] Ghazala Yoder OTR 05/07/19 1356      Row Name 05/07/19 1345             Toilet Transfer    Type (Toilet Transfer)  stand pivot/stand step  -CW      [place] Level (Toilet Transfer)   verbal cues;contact guard  -CW      Assistive Device (Toilet Transfer)  commode, bedside without drop arms;walker, standard  -CW      Recorded by [CW] Ghazala Yoder OTR 05/07/19 1356      Row Name 05/07/19 1347             Lower Body Dressing Assessment/Training    Lower Body Dressing Leon Level  doff;don;socks;verbal cues;contact guard assist  -CW      Lower Body Dressing Position  edge of bed sitting  -CW      Recorded by [CW] Ghazala Yoder OTR 05/07/19 1356      Row Name 05/07/19 1349             Motor Skills Assessment/Interventions    Additional Documentation  Functional Endurance Training (Group);Therapeutic Exercise (Group);Therapeutic Exercise Interventions (Group)  -CW      Recorded by [CW] Ghazala Yoder OTR 05/07/19 1356      Row Name 05/07/19 1347             Therapeutic Exercise    Upper Extremity Range of Motion (Therapeutic Exercise)  shoulder flexion/extension, bilateral;shoulder horizontal abduction/adduction, bilateral;elbow flexion/extension, bilateral;forearm supination/pronation, bilateral;wrist flexion/extension, bilateral  -CW      Hand (Therapeutic Exercise)  finger flexion/extension, bilateral;thumb finger opposition, bilateral  -CW      Exercise Type (Therapeutic Exercise)  AROM (active range of motion)  -CW      Position (Therapeutic Exercise)  seated  -CW      Sets/Reps (Therapeutic Exercise)  10x/set  -CW      Expected Outcome (Therapeutic Exercise)  improve functional tolerance, self-care activity;improve performance, BADLs  -CW      Recorded by [CW] Ghazala Yoder OTR 05/07/19 1356      Row Name 05/07/19 1344             Functional Endurance Training    Comment, Functional Endurance  fair  -CW      Recorded by [CW] Ghazala Yoder OTR 05/07/19 1356      Row Name 05/07/19 1342             Positioning and Restraints    Pre-Treatment Position  in bed  -CW      Post Treatment Position  bed  -CW      In Bed  supine;call light within reach;encouraged to call for assist;SCD pump  applied  -CW      Recorded by [CW] Ghazala Yoder OTR 05/07/19 1356      Row Name 05/07/19 1345             Pain Scale: Numbers Pre/Post-Treatment    Pain Scale: Numbers, Pretreatment  0/10 - no pain  -CW      Pain Scale: Numbers, Post-Treatment  0/10 - no pain  -CW      Recorded by [CW] Ghazala Yoder OTR 05/07/19 1356      Row Name 05/07/19 1345             Plan of Care Review    Plan of Care Reviewed With  patient  -CW      Recorded by [CW] Ghazala Yoder OTR 05/07/19 1356        User Key  (r) = Recorded By, (t) = Taken By, (c) = Cosigned By    Initials Name Effective Dates Discipline    CW Ghazala Yoder OTR 06/08/18 -  OT             Occupational Therapy Education     Title: PT OT SLP Therapies (In Progress)     Topic: Occupational Therapy (In Progress)     Point: ADL training (Done)     Description: Instruct learner(s) on proper safety adaptation and remediation techniques during self care or transfers.   Instruct in proper use of assistive devices.    Learning Progress Summary           Patient Acceptance, E, VU,NR by CW at 5/7/2019  1:56 PM    Comment:  Ed. completed for sequence and safety during BCS transfer.    Acceptance, E,TB, VU by MR at 5/6/2019 12:47 PM    Comment:  Pt educated on role of OT; OT POC.                               User Key     Initials Effective Dates Name Provider Type Discipline     06/08/18 -  Ghazala Yoder OTR Occupational Therapist OT    MR 06/22/16 -  Nadya Brandt, OT Occupational Therapist OT                OT Recommendation and Plan     Plan of Care Review  Plan of Care Reviewed With: patient  Plan of Care Reviewed With: patient  Outcome Summary: OT- No c/o dizziness or pain during tx. UE therap ex. completed EOB. CGA for BSC transfer with cues for sequence and safety.    Outcome Measures     Row Name 05/07/19 1400 05/06/19 1200 05/06/19 1000       How much help from another person do you currently need...    Turning from your back to your side while in flat bed  without using bedrails?  --  --  4  -MS    Moving from lying on back to sitting on the side of a flat bed without bedrails?  --  --  3  -MS    Moving to and from a bed to a chair (including a wheelchair)?  --  --  3  -MS    Standing up from a chair using your arms (e.g., wheelchair, bedside chair)?  --  --  3  -MS    Climbing 3-5 steps with a railing?  --  --  2  -MS    To walk in hospital room?  --  --  3  -MS    AM-PAC 6 Clicks Score  --  --  18  -MS       How much help from another is currently needed...    Putting on and taking off regular lower body clothing?  3  -CW  3  -MR  --    Bathing (including washing, rinsing, and drying)  2  -CW  2  -MR  --    Toileting (which includes using toilet bed pan or urinal)  3  -CW  3  -MR  --    Putting on and taking off regular upper body clothing  3  -CW  3  -MR  --    Taking care of personal grooming (such as brushing teeth)  3  -CW  3  -MR  --    Eating meals  3  -CW  3  -MR  --    Score  17  -CW  17  -MR  --       Functional Assessment    Outcome Measure Options  AM-PAC 6 Clicks Daily Activity (OT)  -CW  AM-PAC 6 Clicks Daily Activity (OT)  -MR  AM-PAC 6 Clicks Basic Mobility (PT)  -MS      User Key  (r) = Recorded By, (t) = Taken By, (c) = Cosigned By    Initials Name Provider Type    CW Ghazala Yoder OTR Occupational Therapist    MS Garth Rios, PT Physical Therapist    MR Nadya Brandt, OT Occupational Therapist           Time Calculation:   Time Calculation- OT     Row Name 05/07/19 1404             Time Calculation- OT    OT Start Time  1120  -CW      OT Stop Time  1143  -CW      OT Time Calculation (min)  23 min  -CW      Total Timed Code Minutes- OT  23 minute(s)  -CW        User Key  (r) = Recorded By, (t) = Taken By, (c) = Cosigned By    Initials Name Provider Type    Ghazala June OTR Occupational Therapist        Therapy Charges for Today     Code Description Service Date Service Provider Modifiers Qty    [id number]  OT SELF CARE/MGMT/TRAIN  EA 15 MIN 5/7/2019 Ghazala Yoder OTR GO 1    [id number]  OT THER PROC EA 15 MIN 5/7/2019 Ghazala Yoder OTR GO 1               Ghazala Wo, OTR  5/7/2019

## 2019-05-07 NOTE — PROGRESS NOTES
Name: Andreas Rubin ADMIT: 2019   : 1950  PCP: Luis Dsouza Jr., MD    MRN: [id number] LOS: 0 days   AGE/SEX: 68 y.o. male  ROOM: [facility]     Subjective   Subjective   Feels okay.  No new complaints.     Objective   Objective   Vital Signs  Temp:  [97.4 °F (36.3 °C)-98.1 °F (36.7 °C)] 98.1 °F (36.7 °C)  Heart Rate:  [66-70] 69  Resp:  [16] 16  BP: (105-114)/(81-84) 106/81  SpO2:  [95 %-97 %] 97 %  on   ;   Device (Oxygen Therapy): room air  Body mass index is 26.96 kg/m².  Physical Exam   Constitutional: He is oriented to person, place, and time. No distress.   Cardiovascular: Normal rate and regular rhythm.   No murmur heard.  Pulmonary/Chest: Effort normal and breath sounds normal.   Abdominal: Soft. Bowel sounds are normal. He exhibits no distension. There is no tenderness.   Musculoskeletal: Normal range of motion. He exhibits no edema.   Neurological: He is alert and oriented to person, place, and time.   Skin: Skin is warm and dry. He is not diaphoretic.       Results Review:       I reviewed the patient's new clinical results.  Results from last 7 days   Lab Units 19  0538 19  2133   WBC 10*3/mm3 4.30 4.44   HEMOGLOBIN g/dL 15.0 16.1   PLATELETS 10*3/mm3 146 146     Results from last 7 days   Lab Units 19  1801 19  0538 19  2133   SODIUM mmol/L  --  140 139   POTASSIUM mmol/L 4.3 3.3* 4.1   CHLORIDE mmol/L  --  97* 97*   CO2 mmol/L  --  30.3* 23.9   BUN mg/dL  --  12 13   CREATININE mg/dL  --  1.26 1.22   GLUCOSE mg/dL  --  134* 153*   Estimated Creatinine Clearance: 74.6 mL/min (by C-G formula based on SCr of 1.26 mg/dL).  Results from last 7 days   Lab Units 19  2133   ALBUMIN g/dL 3.90   BILIRUBIN mg/dL 2.5*   ALK PHOS U/L 234*   AST (SGOT) U/L 63*   ALT (SGPT) U/L 40     Results from last 7 days   Lab Units 19  0538 19  2133   CALCIUM mg/dL 8.5* 8.9   ALBUMIN g/dL  --  3.90   MAGNESIUM mg/dL  --  2.4       Hemoglobin A1C   Date/Time  Value Ref Range Status   05/06/2019 0538 6.90 (H) 4.80 - 5.60 % Final     Glucose   Date/Time Value Ref Range Status   05/07/2019 1200 95 70 - 130 mg/dL Final   05/07/2019 0606 69 (L) 70 - 130 mg/dL Final   05/06/2019 2039 101 70 - 130 mg/dL Final   05/06/2019 1628 164 (H) 70 - 130 mg/dL Final   05/06/2019 1108 128 70 - 130 mg/dL Final   05/06/2019 0603 120 70 - 130 mg/dL Final   05/05/2019 2353 121 70 - 130 mg/dL Final         aspirin 81 mg Oral Daily   citalopram 20 mg Oral Daily   furosemide 40 mg Oral Daily   insulin glargine 20 Units Subcutaneous Q12H   insulin lispro 0-7 Units Subcutaneous 4x Daily With Meals & Nightly   potassium chloride 10 mEq Oral Daily   sodium chloride 3 mL Intravenous Q12H   spironolactone 25 mg Oral Daily      Diet Regular; Cardiac, Consistent Carbohydrate       Assessment/Plan     Active Hospital Problems    Diagnosis  POA   • **Near syncope [R55]  Yes   • Elevated liver function tests [R94.5]  Yes   • Falls [W19.XXXA]  Yes   • Type 2 diabetes mellitus with peripheral neuropathy (CMS/HCC) [E11.42]  Yes   • Paroxysmal atrial fibrillation (CMS/HCC) [I48.0]  Yes   • Sleep apnea [G47.30]  Yes   • Hyperlipidemia [E78.5]  Yes   • Essential hypertension [I10]  Yes   • Coronary artery disease involving native coronary artery of native heart without angina pectoris [I25.10]  Yes      Resolved Hospital Problems   No resolved problems to display.       Mr. Rubin is a 68 y.o. male with a history of intellectual disability and cerebral ataxia who has been admitted with recurrent falls.  He has severe nonischemic cardiomyopathy with EF 16%.    · Chest x-ray essentially unremarkable.  No obvious fluid.  Probable atelectasis.  No symptoms to suggest pneumonia.  · Cardiology is yet to evaluate patient today.  His brother is at bedside who states he called the cardiology office and they told him he would definitely remain hospitalized until at least Thursday when he could be reevaluated by   Jacob and/or Kia Pineda.  I attempted to reach both providers but was unable to do so.  Nursing staff is unclear which cardiologist is evaluating the patient today.    · Defer further plans to cardiology.  Coreg and statin stopped yesterday.      Glynn Pak MD  [facility]  05/07/19  3:58 PM

## 2019-05-07 NOTE — THERAPY TREATMENT NOTE
Acute Care - Physical Therapy Treatment Note  [facility]     Patient Name: Andreas Rubin  : 1950  MRN: [id number]  Today's Date: 2019  Onset of Illness/Injury or Date of Surgery: 19  Date of Referral to PT: 19  Referring Physician: Heidi Reyes    Admit Date: 2019    Visit Dx:    ICD-10-CM ICD-9-CM   1. Elevated liver function tests R94.5 790.6   2. Frequent falls R29.6 V15.88   3. Generalized weakness R53.1 780.79   4. Hyperglycemia R73.9 790.29   5. Muscle weakness (generalized) M62.81 728.87     Patient Active Problem List   Diagnosis   • Type 2 diabetes mellitus with complication, with long-term current use of insulin (CMS/HCC)   • Type 2 diabetes mellitus with diabetic retinopathy (CMS/HCC)   • Hyperlipidemia   • Essential hypertension   • Coronary artery disease involving native coronary artery of native heart without angina pectoris   • S/P angioplasty with stent   • Sleep apnea   • Right-sided carotid artery disease (CMS/HCC)   • Hypotension, postural   • Cardiomyopathy (CMS/HCC)   • Paroxysmal atrial fibrillation (CMS/HCC)   • S/P biventricular cardiac pacemaker procedure   • Falls   • Dizziness   • History of cerebellar stroke   • Cerebellar ataxia (CMS/HCC)   • Type 2 diabetes mellitus with peripheral neuropathy (CMS/HCC)   • Serum potassium elevated   • Elevated liver function tests   • Falls   • Near syncope       Therapy Treatment    Rehabilitation Treatment Summary     Row Name 19 1642 19 1345          Treatment Time/Intention    Discipline  physical therapist  -PC  occupational therapist  -CW     Document Type  therapy note (daily note)  -PC  therapy note (daily note)  -CW     Subjective Information  no complaints  -PC  complains of;fatigue  -CW     Mode of Treatment  physical therapy  -PC  occupational therapy  -CW     Patient/Family Observations  pt is in bed, no  acute distress  -PC  Pt. awake bed level. No pain c/o.  -CW     Therapy  Frequency (PT Clinical Impression)  daily  -PC  --     Patient Effort  --  good  -CW     Existing Precautions/Restrictions  --  fall  -CW     Recorded by [PC] Jaqueline Schneider, PT 05/07/19 1646 [CW] Ghazala Yoder OTR 05/07/19 1356     Row Name 05/07/19 1345             Vital Signs    O2 Delivery Pre Treatment  room air  -CW      Intra SpO2 (%)  94  -CW      O2 Delivery Intra Treatment  room air  -CW      Post SpO2 (%)  96  -CW      Recorded by [CW] Ghazala Yoder OTR 05/07/19 1356      Row Name 05/07/19 1642 05/07/19 1345          Cognitive Assessment/Intervention- PT/OT    Orientation Status (Cognition)  oriented to;person;place  -PC  --     Follows Commands (Cognition)  --  follows one step commands;75-90% accuracy  -CW     Personal Safety Interventions  --  fall prevention program maintained;gait belt;nonskid shoes/slippers when out of bed  -CW     Recorded by [PC] Jaqueline Schneider, PT 05/07/19 1646 [CW] Ghazala Yoder, ARMANIR 05/07/19 1356     Row Name 05/07/19 1345             Safety Issues, Functional Mobility    Safety Issues Affecting Function (Mobility)  insight into deficits/self awareness;safety precaution awareness  -CW      Recorded by [CW] Ghazala Yoder OTR 05/07/19 1359      Row Name 05/07/19 1642 05/07/19 1345          Bed Mobility Assessment/Treatment    Supine-Sit [place] (Bed Mobility)  supervision  -PC  verbal cues;contact guard  -CW     Sit-Supine [place] (Bed Mobility)  supervision  -PC  verbal cues;contact guard  -CW     Recorded by [PC] Jaqueline Schneider, PT 05/07/19 1646 [CW] Ghazala Yoder OTR 05/07/19 1356     Row Name 05/07/19 1345             Transfer Assessment/Treatment    Transfer Assessment/Treatment  toilet transfer  -CW      Recorded by [CW] Ghazala Yoder OTR 05/07/19 1356      Row Name 05/07/19 1642 05/07/19 1345          Sit-Stand Transfer    Sit-Stand [place] (Transfers)  supervision  -PC  verbal cues;contact guard  -CW     Assistive Device (Sit-Stand Transfers)   walker, front-wheeled  -PC  walker, standard  -CW     Recorded by [PC] Jaqueline Schneider, PT 05/07/19 1646 [CW] Ghazala Yoder OTR 05/07/19 1356     Row Name 05/07/19 1642 05/07/19 1345          Stand-Sit Transfer    Stand-Sit [place] (Transfers)  supervision  -PC  verbal cues;contact guard  -CW     Assistive Device (Stand-Sit Transfers)  walker, front-wheeled  -PC  walker, standard  -CW     Recorded by [PC] Jaqueline Schneider, PT 05/07/19 1646 [CW] Ghazala Yoder OTR 05/07/19 1356     Row Name 05/07/19 1345             Toilet Transfer    Type (Toilet Transfer)  stand pivot/stand step  -CW      [place] Level (Toilet Transfer)  verbal cues;contact guard  -CW      Assistive Device (Toilet Transfer)  commode, bedside without drop arms;walker, standard  -CW      Recorded by [CW] Ghazala Yoder OTR 05/07/19 1356      Row Name 05/07/19 1642             Gait/Stairs Assessment/Training    [place] Level (Gait)  contact guard  -PC      Assistive Device (Gait)  walker, front-wheeled  -PC      Distance in Feet (Gait)  150 ft  -PC      Pattern (Gait)  step-through  -PC      Deviations/Abnormal Patterns (Gait)  ataxic  -PC      Comment (Gait/Stairs)  some assist to steer walker  -PC      Recorded by [PC] Jaqueline Schneider, PT 05/07/19 1646      Row Name 05/07/19 1345             Lower Body Dressing Assessment/Training    Lower Body Dressing [place] Level  doff;don;socks;verbal cues;contact guard assist  -CW      Lower Body Dressing Position  edge of bed sitting  -CW      Recorded by [CW] Ghazala Yoder OTR 05/07/19 1356      Row Name 05/07/19 1345             Motor Skills Assessment/Interventions    Additional Documentation  Functional Endurance Training (Group);Therapeutic Exercise (Group);Therapeutic Exercise Interventions (Group)  -CW      Recorded by [CW] Ghazala Yoder OTR 05/07/19 1356      Row Name 05/07/19 1345             Therapeutic Exercise    Upper Extremity Range of Motion (Therapeutic Exercise)   shoulder flexion/extension, bilateral;shoulder horizontal abduction/adduction, bilateral;elbow flexion/extension, bilateral;forearm supination/pronation, bilateral;wrist flexion/extension, bilateral  -CW      Hand (Therapeutic Exercise)  finger flexion/extension, bilateral;thumb finger opposition, bilateral  -CW      Exercise Type (Therapeutic Exercise)  AROM (active range of motion)  -CW      Position (Therapeutic Exercise)  seated  -CW      Sets/Reps (Therapeutic Exercise)  10x/set  -CW      Expected Outcome (Therapeutic Exercise)  improve functional tolerance, self-care activity;improve performance, BADLs  -CW      Recorded by [CW] Ghazala Yoder OTR 05/07/19 1356      Row Name 05/07/19 1345             Functional Endurance Training    Comment, Functional Endurance  fair  -CW      Recorded by [CW] Ghazala Yoder OTR 05/07/19 1356      Row Name 05/07/19 1642 05/07/19 1345          Positioning and Restraints    Pre-Treatment Position  in bed  -PC  in bed  -CW     Post Treatment Position  bed  -PC  bed  -CW     In Bed  supine;call light within reach;encouraged to call for assist  -PC  supine;call light within reach;encouraged to call for assist;SCD pump applied  -CW     Recorded by [PC] Jaqueline Schneider, PT 05/07/19 1646 [CW] Ghazala Yoder OTR 05/07/19 1356     Row Name 05/07/19 1345             Pain Scale: Numbers Pre/Post-Treatment    Pain Scale: Numbers, Pretreatment  0/10 - no pain  -CW      Pain Scale: Numbers, Post-Treatment  0/10 - no pain  -CW      Recorded by [CW] Ghazala Yoder OTR 05/07/19 1356      Row Name 05/07/19 1345             Plan of Care Review    Plan of Care Reviewed With  patient  -CW      Recorded by [CW] Ghazala Yoder OTR 05/07/19 1356      Row Name 05/07/19 1642             Outcome Summary/Treatment Plan (PT)    Anticipated Discharge Disposition (PT)  home with assist  -PC      Recorded by [PC] Jaqueline Schneider, PT 05/07/19 1646        User Key  (r) = Recorded By, (t) = Taken By, (c) =  Cosigned By    Initials Name Effective Dates Discipline    CW Ghazala Yoder, OTR 06/08/18 -  OT    PC Jaqueline Schneider, PT 04/03/18 -  PT                   Physical Therapy Education     Title: PT OT SLP Therapies (In Progress)     Topic: Physical Therapy (In Progress)     Point: Mobility training (In Progress)     Learning Progress Summary           Patient Acceptance, E,D, NR by PC at 5/7/2019  4:46 PM    Acceptance, E,D, VU,NR by MS at 5/6/2019 10:02 AM                   Point: Home exercise program (Done)     Learning Progress Summary           Patient Acceptance, E,D, VU,NR by MS at 5/6/2019 10:02 AM                   Point: Body mechanics (In Progress)     Learning Progress Summary           Patient Acceptance, E,D, NR by PC at 5/7/2019  4:46 PM    Acceptance, E,D, VU,NR by MS at 5/6/2019 10:02 AM                   Point: Precautions (Done)     Learning Progress Summary           Patient Acceptance, E,D, VU,NR by MS at 5/6/2019 10:02 AM                               User Key     Initials Effective Dates Name Provider Type Discipline     04/03/18 -  Jaqueline Schneider, PT Physical Therapist PT    MS 04/03/18 -  Garth Rios, PT Physical Therapist PT                PT Recommendation and Plan  Anticipated Discharge Disposition (PT): home with assist  Therapy Frequency (PT Clinical Impression): daily  Outcome Summary/Treatment Plan (PT)  Anticipated Discharge Disposition (PT): home with assist  Plan of Care Reviewed With: patient  Outcome Summary: pt improving with mobility, he required less assist and was able to increase distance, good progress  Outcome Measures     Row Name 05/07/19 1600 05/07/19 1400 05/06/19 1200       How much help from another person do you currently need...    Turning from your back to your side while in flat bed without using bedrails?  4  -PC  --  --    Moving from lying on back to sitting on the side of a flat bed without bedrails?  4  -PC  --  --    Moving to and from a bed to a  chair (including a wheelchair)?  3  -PC  --  --    Standing up from a chair using your arms (e.g., wheelchair, bedside chair)?  3  -PC  --  --    Climbing 3-5 steps with a railing?  3  -PC  --  --    To walk in hospital room?  3  -PC  --  --    AM-PAC 6 Clicks Score  20  -PC  --  --       How much help from another is currently needed...    Putting on and taking off regular lower body clothing?  --  3  -CW  3  -MR    Bathing (including washing, rinsing, and drying)  --  2  -CW  2  -MR    Toileting (which includes using toilet bed pan or urinal)  --  3  -CW  3  -MR    Putting on and taking off regular upper body clothing  --  3  -CW  3  -MR    Taking care of personal grooming (such as brushing teeth)  --  3  -CW  3  -MR    Eating meals  --  3  -CW  3  -MR    Score  --  17  -CW  17  -MR       Functional Assessment    Outcome Measure Options  --  AM-PAC 6 Clicks Daily Activity (OT)  -CW  AM-PAC 6 Clicks Daily Activity (OT)  -MR    Row Name 05/06/19 1000             How much help from another person do you currently need...    Turning from your back to your side while in flat bed without using bedrails?  4  -MS      Moving from lying on back to sitting on the side of a flat bed without bedrails?  3  -MS      Moving to and from a bed to a chair (including a wheelchair)?  3  -MS      Standing up from a chair using your arms (e.g., wheelchair, bedside chair)?  3  -MS      Climbing 3-5 steps with a railing?  2  -MS      To walk in hospital room?  3  -MS      AM-PAC 6 Clicks Score  18  -MS         Functional Assessment    Outcome Measure Options  AM-PAC 6 Clicks Basic Mobility (PT)  -MS        User Key  (r) = Recorded By, (t) = Taken By, (c) = Cosigned By    Initials Name Provider Type    Ghazala June, OTR Occupational Therapist    Jaqueline Negrete, PT Physical Therapist    MS Garth Rios, PT Physical Therapist    MR Nadya Brandt, OT Occupational Therapist         Time Calculation:   PT Charges     Row Name  05/07/19 1647             Time Calculation    Start Time  1631  -PC      Stop Time  1645  -PC      Time Calculation (min)  14 min  -PC      PT Received On  05/07/19  -PC      PT - Next Appointment  05/08/19  -PC        User Key  (r) = Recorded By, (t) = Taken By, (c) = Cosigned By    Initials Name Provider Type    PC Jaqueline Schneider, PT Physical Therapist        Therapy Charges for Today     Code Description Service Date Service Provider Modifiers Qty    25058500665 HC PT THER PROC EA 15 MIN 5/7/2019 Jaqueline Schneider, PT GP 1          PT G-Codes  Outcome Measure Options: AM-PAC 6 Clicks Daily Activity (OT)  AM-PAC 6 Clicks Score: 20  Score: 17    Jaqueline Schneider, JESSICA  5/7/2019

## 2019-05-07 NOTE — PROGRESS NOTES
LOS: 0 days   Patient Care Team:  Luis Dsouza Jr., MD as PCP - General (Family Medicine)  Helio Carpenter MD as PCP - Claims Attributed  Garth Arias MD as Consulting Physician (Vascular Surgery)  Regino James MD as Consulting Physician (Cardiology)  Drew Wade MD as Consulting Physician (Ophthalmology)  Jim Irene DO as Consulting Physician (Neurology)  Adan Barnes MD as Consulting Physician (Pulmonary Disease)  Helio Carpenter MD as Consulting Physician (Endocrinology)    Chief Complaint:  Lightheadedness    Interval History:   Patient with continued lightheadedness, unclear cause.  There are issues with find it appropriate place to discharge the patient.    Objective   Vital Signs  Temp:  [97.4 °F (36.3 °C)-98.1 °F (36.7 °C)] 98.1 °F (36.7 °C)  Heart Rate:  [66-70] 69  Resp:  [16] 16  BP: (105-114)/(81-84) 106/81    Intake/Output Summary (Last 24 hours) at 5/7/2019 1627  Last data filed at 5/7/2019 1438  Gross per 24 hour   Intake 1080 ml   Output 2475 ml   Net -1395 ml       Physical Exam   Constitutional: He appears well-developed.   HENT:   Right Ear: External ear normal.   Left Ear: External ear normal.   Eyes: Right eye exhibits no discharge. Left eye exhibits no discharge.   Neck: No tracheal deviation present.   Cardiovascular: Normal rate and regular rhythm.   Pulmonary/Chest: Effort normal.   Abdominal: Soft.   Musculoskeletal: He exhibits no edema.   Neurological: He is alert.   Skin: Skin is warm.         Results Review:      Results from last 7 days   Lab Units 05/06/19  1801 05/06/19  0538 05/05/19  2133   SODIUM mmol/L  --  140 139   POTASSIUM mmol/L 4.3 3.3* 4.1   CHLORIDE mmol/L  --  97* 97*   CO2 mmol/L  --  30.3* 23.9   BUN mg/dL  --  12 13   CREATININE mg/dL  --  1.26 1.22   GLUCOSE mg/dL  --  134* 153*   CALCIUM mg/dL  --  8.5* 8.9     Results from last 7 days   Lab Units 05/05/19  2133   CK TOTAL U/L 310*   TROPONIN T ng/mL 0.028     Results from last 7  days   Lab Units 05/06/19  0538 05/05/19  2133   WBC 10*3/mm3 4.30 4.44   HEMOGLOBIN g/dL 15.0 16.1   HEMATOCRIT % 44.3 47.7   PLATELETS 10*3/mm3 146 146             Results from last 7 days   Lab Units 05/05/19  2133   MAGNESIUM mg/dL 2.4           I reviewed the patient's new clinical results.  I personally viewed and interpreted the patient's EKG/Telemetry tracing        Medication Review:     aspirin 81 mg Oral Daily   citalopram 20 mg Oral Daily   furosemide 40 mg Oral Daily   insulin glargine 20 Units Subcutaneous Q12H   insulin lispro 0-7 Units Subcutaneous 4x Daily With Meals & Nightly   potassium chloride 10 mEq Oral Daily   sodium chloride 3 mL Intravenous Q12H   spironolactone 25 mg Oral Daily            Assessment/Plan       Near syncope    Hyperlipidemia    Essential hypertension    Coronary artery disease involving native coronary artery of native heart without angina pectoris    Sleep apnea    Paroxysmal atrial fibrillation (CMS/HCC)    Type 2 diabetes mellitus with peripheral neuropathy (CMS/HCC)    Elevated liver function tests    Falls    Patient is not known to me prior to my visit today.  He has a history of severe cardiomyopathy and CRT-P placement.  He was evaluated by Dr. James yesterday he felt the symptoms were due to low blood pressure, no arrhythmia was noted on the device.  He has had ejection fraction of 60%.  There is no further therapy that we can offer this patient for his heart failure, and he is not tolerating any of the recommended medications such as beta-blockers and lisinopril.  Thus I feel that his heart failure is end-stage, and it would be appropriate to consider palliative or hospice care.    Benton Copeland MD  05/07/19  4:27 PM

## 2019-05-07 NOTE — PLAN OF CARE
Problem: Fall Risk (Adult)  Goal: Absence of Fall  Outcome: Ongoing (interventions implemented as appropriate)      Problem: Patient Care Overview  Goal: Plan of Care Review  Outcome: Ongoing (interventions implemented as appropriate)   05/07/19 0336   Coping/Psychosocial   Plan of Care Reviewed With patient   OTHER   Outcome Summary Denies pain or dizziness. VSS. Voids per urinal. Rested well at long intervals. Will continue to monitor.   Plan of Care Review   Progress improving       Problem: Syncope (Adult)  Goal: Physical Safety/Health Maintenance  Outcome: Ongoing (interventions implemented as appropriate)      Problem: Fluid Volume Excess (Adult)  Goal: Optimal Fluid Balance  Outcome: Ongoing (interventions implemented as appropriate)

## 2019-05-07 NOTE — PROGRESS NOTES
Continued Stay Note  [facility]     Patient Name: Andreas Rubin  MRN: [id number]  Today's Date: 5/7/2019    Admit Date: 5/5/2019    Discharge Plan     Row Name 05/07/19 1522       Plan    Plan  Home with brother     Plan Comments  Pt will likely be ready for dc today. Spoke with pt and his brother, Nino at bedside. Nino plans for the pt to return home. Nino plans on starting the process of activating the pts Long term care insurance policy. Nino states that he hasnt yet because he hasnt needed to. Fani caregiver assists the pt as well.  DIMAS recommended getting a BSC so the pt doesnt have as far to ambulate to the bathroom. Per Nino, they have a BSC at home. Nino states there is only a short amount of time in the evening when its just them two at home. Pt is ready for dc from a discharge planning stand point. Follow up appt for 1wk with the PCP set up for 5/14/19 @ 8:30am. Danielle/CCP         Discharge Codes    No documentation.             Thi Issa RN

## 2019-05-07 NOTE — PLAN OF CARE
Problem: Patient Care Overview  Goal: Plan of Care Review  Outcome: Ongoing (interventions implemented as appropriate)   05/07/19 5659   Coping/Psychosocial   Plan of Care Reviewed With patient   OTHER   Outcome Summary pt improving with mobility, he required less assist and was able to increase distance, good progress

## 2019-05-07 NOTE — PLAN OF CARE
Problem: Patient Care Overview  Goal: Plan of Care Review   05/07/19 4942   Coping/Psychosocial   Plan of Care Reviewed With patient   OTHER   Outcome Summary OT- No c/o dizziness or pain during tx. UE therap ex. completed EOB. CGA- BSC transfer with cues for sequence and safety using wx. CGA to don/doff socks EOB.  Endurance fair during tx. Plan to cont OT to increase indep, activity tolerance, and safety with self care/functional mobility.

## 2019-05-07 NOTE — PLAN OF CARE
Problem: Fall Risk (Adult)  Goal: Identify Related Risk Factors and Signs and Symptoms  Outcome: Ongoing (interventions implemented as appropriate)   05/07/19 1421   Fall Risk (Adult)   Related Risk Factors (Fall Risk) gait/mobility problems;history of falls;environment unfamiliar;age-related changes   Signs and Symptoms (Fall Risk) presence of risk factors     Goal: Absence of Fall  Outcome: Ongoing (interventions implemented as appropriate)      Problem: Patient Care Overview  Goal: Plan of Care Review  Outcome: Ongoing (interventions implemented as appropriate)   05/07/19 1421   Coping/Psychosocial   Plan of Care Reviewed With patient   OTHER   Outcome Summary no c/o pain or dizziness, up with help and walker, voids per urinal, paced, will continue to monitor    Plan of Care Review   Progress improving       Problem: Syncope (Adult)  Goal: Identify Related Risk Factors and Signs and Symptoms  Outcome: Ongoing (interventions implemented as appropriate)    Goal: Physical Safety/Health Maintenance  Outcome: Ongoing (interventions implemented as appropriate)    Goal: Optimal Emotional/Functional [place]  Outcome: Ongoing (interventions implemented as appropriate)      Problem: Fluid Volume Excess (Adult)  Goal: Identify Related Risk Factors and Signs and Symptoms  Outcome: Ongoing (interventions implemented as appropriate)    Goal: Optimal Fluid Balance  Outcome: Ongoing (interventions implemented as appropriate)

## 2019-05-08 VITALS
HEART RATE: 71 BPM | HEIGHT: 73 IN | BODY MASS INDEX: 26.53 KG/M2 | WEIGHT: 200.2 LBS | TEMPERATURE: 98 F | RESPIRATION RATE: 16 BRPM | SYSTOLIC BLOOD PRESSURE: 114 MMHG | DIASTOLIC BLOOD PRESSURE: 84 MMHG | OXYGEN SATURATION: 96 %

## 2019-05-08 LAB
GLUCOSE BLDC GLUCOMTR-MCNC: 126 MG/DL (ref 70–130)
GLUCOSE BLDC GLUCOMTR-MCNC: 128 MG/DL (ref 70–130)
GLUCOSE BLDC GLUCOMTR-MCNC: 57 MG/DL (ref 70–130)
GLUCOSE BLDC GLUCOMTR-MCNC: 82 MG/DL (ref 70–130)

## 2019-05-08 PROCEDURE — 82962 GLUCOSE BLOOD TEST: CPT

## 2019-05-08 PROCEDURE — 97110 THERAPEUTIC EXERCISES: CPT

## 2019-05-08 PROCEDURE — G0378 HOSPITAL OBSERVATION PER HR: HCPCS

## 2019-05-08 PROCEDURE — 97110 THERAPEUTIC EXERCISES: CPT | Performed by: OCCUPATIONAL THERAPIST

## 2019-05-08 RX ORDER — INSULIN GLARGINE 100 [IU]/ML
15 INJECTION, SOLUTION SUBCUTANEOUS EVERY 12 HOURS SCHEDULED
Refills: 12
Start: 2019-05-08 | End: 2020-01-01

## 2019-05-08 RX ORDER — ACETAMINOPHEN 325 MG/1
650 TABLET ORAL EVERY 4 HOURS PRN
Start: 2019-05-08

## 2019-05-08 RX ADMIN — ASPIRIN 81 MG: 81 TABLET, DELAYED RELEASE ORAL at 09:20

## 2019-05-08 RX ADMIN — SPIRONOLACTONE 25 MG: 25 TABLET, FILM COATED ORAL at 09:20

## 2019-05-08 RX ADMIN — POTASSIUM CHLORIDE 10 MEQ: 750 CAPSULE, EXTENDED RELEASE ORAL at 09:20

## 2019-05-08 RX ADMIN — FUROSEMIDE 40 MG: 40 TABLET ORAL at 09:21

## 2019-05-08 RX ADMIN — CITALOPRAM 20 MG: 20 TABLET, FILM COATED ORAL at 09:20

## 2019-05-08 NOTE — PROGRESS NOTES
Continued Stay Note  [facility]     Patient Name: Andreas Rubin  MRN: [id number]  Today's Date: 5/8/2019    Admit Date: 5/5/2019    Discharge Plan     Row Name 05/08/19 1716       Plan    Plan  Home with brother     Plan Comments  Spoke with Nino and informed him that the pt will be picked up by [employer] wheelchair van at 6pm. He is agreeable. JChasteenRN/CCP      Discharge Codes    No documentation.       Expected Discharge Date and Time     Expected Discharge Date Expected Discharge Time    May 8, 2019             Thi Issa RN

## 2019-05-08 NOTE — CONSULTS
Purpose of the visit was to evaluate for: goals of care/advanced care planning, support for patient/family and hospice referral/discussion. Spoke with RN and CCP as well as patient and POA and discussed palliative care, goals of care, care options, [facility] and discharge options.      Assessment:  Patient is palliative care appropriate for community based services with [facility] or SNF with palliative care given h/o atrial fibrillation, CHF, CAD, and cardiomyopathy. Patient with EF 15%.  Patient has intellectual disability and brother is POA. Patient denied having any pain or SOA. Patient uses walker and has steadily become weaker which has lead to 2 falls prior to current admission. PPS 40%. Pyschosocial Acuity: 1-Normal complexity. Spiritual Acuity: 1- Normal complexity.     Recommendations/Plan: Patient plan to d/c home living with brother. Patient's brother will make [facility] referral for in home supportive services.     Other Comments: Met with patient and patient's POA (brother- Nino). Discussed goals of care and hospice services options with patient and brother. Brother expressed awareness that patient has steadily declined with being weaker and more tired. Patient's brother discussed having a long term care policy but requires 90 gab period. Patient's brother currently has a caregiver in the home 7 days a week from 8a-7p. Brother noted that his caregivers have also been providing necessary care to patient. Plan to continue to use his caregivers for patient until his long term care policy is able to be used. Discussed both hospice and palliative care services with [facility]. Patient and brother receptive to home with hospice and provided information on how to make hospice referral. Patient eager to return home and brother agreeable to make hospice referral once discharged.

## 2019-05-08 NOTE — PROGRESS NOTES
Continued Stay Note  [facility]     Patient Name: Andreas Rubin  MRN: [id number]  Today's Date: 5/8/2019    Admit Date: 5/5/2019    Discharge Plan     Row Name 05/08/19 1642       Plan    Plan Comments  [employer] wheelchair van to  the pt at the main entrance of the hospital at 6pm. TayRN/CCP          Discharge Codes    No documentation.       Expected Discharge Date and Time     Expected Discharge Date Expected Discharge Time    May 8, 2019             Thi Issa RN

## 2019-05-08 NOTE — DISCHARGE SUMMARY
[facility]    ASSOCIATES  [phone]    DISCHARGE SUMMARY  [facility]    Patient Identification:  Name: Andreas Rubin  Age: 68 y.o.  Sex: male  :  1950  MRN: [id number]  Primary Care Physician: Luis Dsouza Jr., MD    Admit date: 2019  Discharge date and time:      Discharge Diagnoses:  Near syncope    Hyperlipidemia    Essential hypertension    Coronary artery disease involving native coronary artery of native heart without angina pectoris    Sleep apnea    Paroxysmal atrial fibrillation (CMS/HCC)    Type 2 diabetes mellitus with peripheral neuropathy (CMS/HCC)    Elevated liver function tests    Falls       History of present illness from H&P:    Mr. Rubin is a 68 y.o. male with a history of a fib, CHF, CAD, DM2, HLD, HTN, cardiomyopathy, PALMER that presents to [facility] complaining of falls today. Patient has history of intellectual disability and brother at bedside to assist in history currently. Patient reports that he fell twice today, brother reporting once he fell out of his chair and the second time fell off the toilet. Patient unsure cause of fall but states that he did become weak and lightheaded, though denies loss of consciousness or hitting his head with either fall. Patient does have AICD in place and was just seen at cardiologist on Friday and it was interrogated at that time. Patient's brother reports that patient has been sleeping quite a bit recently, both yesterday and today, but patient denies other complaints at this time. Patient denies fever, chills, chest pain, shortness of breath, abdominal pain, changes in bowel or bladder habits, worsening edema. He does have history of CHF and takes diuretics, but does not appear fluid overloaded at this time. Of note, patient's brother at bedside reports that patient was experiencing lots of falls last summer, physical therapy began working with him and things improved. He  "reports that he was seen in ED at some point and diagnosis changed to \"chronic fatigue\" and states that physical therapy stopped coming to work with patient because of this diagnosis.     Hospital Course:     The patient was admitted for near syncope.  He has been taken off of his Coreg.  He was seen in consultation during hospitalization by cardiology.  At this point he is doing reasonably well.  Patient's anxious for discharge from the hospital.  Arrangements have been made for outpatient care.      Further hospital course by problem list:    Near syncope  - the patient has had 2 falls today resulting from weakness and lightheadedness, he has been taken off of his Coreg here.   -The patient was able to walk 150 feet with physical therapy.  -pacemaker/AICD in place, per our EP consultation interrogation was fairly unremarkable.  Our EP physicians however state that the patient's condition is consistent with end-stage heart failure.  Cardiology states treatment options at this point are limited  -[facility] has discussed with the patient's brother who is his power of      DM2-his blood sugars were slightly low this morning so we have decreased the dose of insulin.     PAF/PALMER/HTN/HLD/CHF  -stable, continue home dose medications for now    Elevated bilirubin and AST likely from heart failure      The patient was seen and examined on the day of discharge.    Consults:   Consults     Date and Time Order Name Status Description    5/5/2019 2323 Inpatient Cardiology Consult      5/5/2019 2825 [facility] (on-call MD unless specified) Completed           Results from last 7 days   Lab Units 05/06/19  0538   WBC 10*3/mm3 4.30   HEMOGLOBIN g/dL 15.0   HEMATOCRIT % 44.3   PLATELETS 10*3/mm3 146       Results from last 7 days   Lab Units 05/06/19  1801 05/06/19  0538   SODIUM mmol/L  --  140   POTASSIUM mmol/L 4.3 3.3*   CHLORIDE mmol/L  --  97*   CO2 mmol/L  --  30.3*   BUN mg/dL  --  12   CREATININE mg/dL  --  1.26   GLUCOSE mg/dL "  --  134*   CALCIUM mg/dL  --  8.5*       Significant Diagnostic Studies:   Lab Results   Component Value Date    WBC 4.30 05/06/2019    HGB 15.0 05/06/2019    HCT 44.3 05/06/2019     05/06/2019     Lab Results   Component Value Date     05/06/2019    K 4.3 05/06/2019    CL 97 (L) 05/06/2019    CO2 30.3 (H) 05/06/2019    BUN 12 05/06/2019    CREATININE 1.26 05/06/2019    GLUCOSE 134 (H) 05/06/2019     Lab Results   Component Value Date    CALCIUM 8.5 (L) 05/06/2019    MG 2.4 05/05/2019     Lab Results   Component Value Date    AST 63 (H) 05/05/2019    ALT 40 05/05/2019    ALKPHOS 234 (H) 05/05/2019     No results found for: APTT, INR  Lab Results   Component Value Date    COLORU Yellow 05/05/2019    CLARITYU Clear 05/05/2019    PHUR <=5.0 05/05/2019    GLUCOSEU Negative 05/05/2019    KETONESU Negative 05/05/2019    BLOODU Moderate (2+) (A) 05/05/2019    LEUKOCYTESUR Negative 05/05/2019    BILIRUBINUR Negative 05/05/2019    UROBILINOGEN 1.0 E.U./dL 05/05/2019    RBCUA 0-2 05/05/2019    WBCUA 0-2 05/05/2019    BACTERIA None Seen 05/05/2019     Lab Results   Component Value Date    TROPONINT 0.028 05/05/2019     No components found for: HGBA1C;2  No components found for: TSH;2    Imaging Results (all)     Procedure Component Value Units Date/Time    XR Chest PA & Lateral [[id number]] Collected:  05/06/19 1930     Updated:  05/06/19 1935    Narrative:       XR CHEST PA AND LATERAL-     HISTORY: Male who is 68 years-old,  decreased breath sounds     TECHNIQUE: Frontal and lateral views of the chest     COMPARISON: 01/23/2019     FINDINGS: Heart is enlarged. Left-sided pacemaker and cardiac leads.  Pulmonary vasculature is unremarkable. Small likely atelectasis or  infiltrate at the right base. No pleural effusion, or pneumothorax.  Right hemidiaphragm remains elevated. No acute osseous process.       Impression:       Small likely atelectasis or infiltrate at the right base,  follow up as indications persist.  Cardiomegaly.     This report was finalized on 5/6/2019 7:32 PM by Dr. Philippe Duckworth M.D.       CT Head Without Contrast [[id number]] Collected:  05/05/19 2237     Updated:  05/05/19 2242    Narrative:       CT OF THE HEAD WITHOUT CONTRAST     HISTORY: Dizziness and weakness. Multiple falls today.     COMPARISON: 12/26/2018     TECHNIQUE: Axial CT imaging was obtained from the vertex of the skull  and skull base. No IV contrast was administered.     FINDINGS:  No acute intracranial hemorrhage is identified. There is diffuse  atrophy, with compensatory ventricular dilatation. It may be mildly  advanced for the patient's age of 68. There is periventricular and deep  white matter microangiopathic disease. There is an old right cerebellar  infarct. There is no midline shift or mass effect. The paranasal sinuses  and mastoid air cells appear clear. No calvarial fracture is seen. No  focal soft tissue abnormalities are identified.       Impression:       No acute intracranial process identified.     Radiation dose reduction techniques were utilized, including automated  exposure control and exposure modulation based on body size.     This report was finalized on 5/5/2019 10:39 PM by Dr. Mell Muse M.D.         No results found for: SITE, ALLENTEST, PHART, HME0NDT, PO2ART, MPV5EPP, BASEEXCESS, Z1PDJPUZ, HGBBG, HCTABG, OXYHEMOGLOBI, METHHGBN, CARBOXYHGB, CO2CT, BAROMETRIC, MODALITY, FIO2       Discharge Medications      New Medications      Instructions Start Date   acetaminophen 325 MG tablet  Commonly known as:  TYLENOL   650 mg, Oral, Every 4 Hours PRN      insulin glargine 100 UNIT/ML injection  Commonly known as:  LANTUS  Replaces:  insulin detemir 100 UNIT/ML injection   15 Units, Subcutaneous, Every 12 Hours Scheduled         Continue These Medications      Instructions Start Date   aspirin 81 MG tablet   81 mg, Oral, Daily      atorvastatin 40 MG tablet  Commonly known as:  LIPITOR   ALTERNATE 1 TABLET  AND 1/2 TABLET DAILY      JERRY MICROLET LANCETS lancets   Does not apply, Daily      citalopram 20 MG tablet  Commonly known as:  CeleXA   TAKE 1 TABLET BY MOUTH EVERYDAY.      furosemide 40 MG tablet  Commonly known as:  LASIX   TAKE 1 TABLET BY MOUTH EVERY DAY      HUMALOG KWIKPEN 100 UNIT/ML solution pen-injector  Generic drug:  Insulin Lispro   15 units 3 times a day with meals      KLOR-CON 10 MEQ CR tablet  Generic drug:  potassium chloride   TAKE 1 TABLET BY MOUTH EVERY DAY      metFORMIN 500 MG tablet  Commonly known as:  GLUCOPHAGE   TAKE 1 TABLET BY MOUTH TWICE A DAY WITH MEALS      MULTI FOR HIM capsule   1 tablet/day, Oral, Daily      nitroglycerin 0.4 MG SL tablet  Commonly known as:  NITROSTAT   Sublingual      rOPINIRole 1 MG tablet  Commonly known as:  REQUIP   1 tablet, Oral      spironolactone 25 MG tablet  Commonly known as:  ALDACTONE   TAKE 1 TABLET DAILY.         Stop These Medications    JERRY CONTOUR NEXT TEST VI     [employer] VITAMIN E 400 UNIT capsule  Generic drug:  vitamin E     glucose blood test strip  Commonly known as:  ONE TOUCH ULTRA TEST     insulin detemir 100 UNIT/ML injection  Commonly known as:  LEVEMIR FLEXTOUCH  Replaced by:  insulin glargine 100 UNIT/ML injection        ASK your doctor about these medications      Instructions Start Date   carvedilol 6.25 MG tablet  Commonly known as:  COREG   6.25 mg, Oral, 2 Times Daily               Patient Instructions:       Future Appointments   Date Time Provider Department Center   5/14/2019  8:30 AM Mari Manriquez APRN [facility] None   5/24/2019  9:15 AM Allen Delaney MD [facility] None   6/26/2019  8:45 AM Luis Dsouza Jr., MD [facility] None   7/11/2019 10:00 AM Helio Carpenter MD [facility] None   8/7/2019 12:00 AM PACEART REMOTE, DEJUAN HINOJOSA [facility] None   11/11/2019  9:00 AM CAIO IN OFFICE, DEJUAN LYNN [facility] None   11/11/2019  9:20 AM Regino James MD [facility] None        Follow-up Information     Luis Dsouza  MD [facility] .    Specialty:  Family Medicine  Contact information:  [address]  [place] [id number]  [phone]                   Discharge Order (From admission, onward)    Start     Ordered    05/08/19 1434  Discharge patient  Once     Expected Discharge Date:  05/08/19    Discharge Disposition:  Home or Self Care    Physician of Record for Attribution - Please select from Treatment Team:  NAWAF NÚÑEZ [[id number]]    Review needed by CMO to determine Physician of Record:  No       Question Answer Comment   Physician of Record for Attribution - Please select from Treatment Team NAWAF NÚÑEZ    Review needed by CMO to determine Physician of Record No        05/08/19 1436          Diet Order   Procedures   • Diet Regular; Cardiac, Consistent Carbohydrate       Discharge instructions:  Follow up with your primary care provider in 1-2 weeks with a cbc and cmp         Total time spent discharging patient including evaluation, post hospitalization follow up,  medication and post hospitalization instructions and education, total time exceeds 30 minutes.    Signed:  Nawaf Núñez MD  5/8/2019  2:45 PM

## 2019-05-08 NOTE — PLAN OF CARE
Problem: Fall Risk (Adult)  Goal: Identify Related Risk Factors and Signs and Symptoms  Outcome: Ongoing (interventions implemented as appropriate)      Problem: Patient Care Overview  Goal: Plan of Care Review  Outcome: Ongoing (interventions implemented as appropriate)   05/07/19 1421 05/08/19 0242 05/08/19 0451   Coping/Psychosocial   Plan of Care Reviewed With --  patient --    OTHER   Outcome Summary --  --  Denies pain, SOA, n/v. up to BR with stand-by aand walker. Denied dizziness.    6AM glucose 57. Pt  awake , alert, talking with no s/s of acute symptoms Pt denies symptoms. Treated and glucose rechecked at 81.   Plan of Care Review   Progress improving --  --        Problem: Syncope (Adult)  Goal: Physical Safety/Health Maintenance  Outcome: Ongoing (interventions implemented as appropriate)    Goal: Optimal Emotional/Functional [place]  Outcome: Ongoing (interventions implemented as appropriate)      Problem: Fluid Volume Excess (Adult)  Goal: Optimal Fluid Balance  Outcome: Outcome(s) achieved Date Met: 05/08/19

## 2019-05-08 NOTE — PLAN OF CARE
Problem: Patient Care Overview  Goal: Plan of Care Review   05/08/19 1000   Coping/Psychosocial   Plan of Care Reviewed With patient   OTHER   Outcome Summary Pt tolerates UE ther ex with mild fatigue.

## 2019-05-08 NOTE — THERAPY TREATMENT NOTE
Acute Care - Physical Therapy Treatment Note  [facility]     Patient Name: Andreas Rubin  : 1950  MRN: [id number]  Today's Date: 2019  Onset of Illness/Injury or Date of Surgery: 19  Date of Referral to PT: 19  Referring Physician: Heidi Reyes    Admit Date: 2019    Visit Dx:    ICD-10-CM ICD-9-CM   1. Elevated liver function tests R94.5 790.6   2. Frequent falls R29.6 V15.88   3. Generalized weakness R53.1 780.79   4. Hyperglycemia R73.9 790.29   5. Muscle weakness (generalized) M62.81 728.87     Patient Active Problem List   Diagnosis   • Type 2 diabetes mellitus with complication, with long-term current use of insulin (CMS/HCC)   • Type 2 diabetes mellitus with diabetic retinopathy (CMS/HCC)   • Hyperlipidemia   • Essential hypertension   • Coronary artery disease involving native coronary artery of native heart without angina pectoris   • S/P angioplasty with stent   • Sleep apnea   • Right-sided carotid artery disease (CMS/HCC)   • Hypotension, postural   • Cardiomyopathy (CMS/HCC)   • Paroxysmal atrial fibrillation (CMS/HCC)   • S/P biventricular cardiac pacemaker procedure   • Falls   • Dizziness   • History of cerebellar stroke   • Cerebellar ataxia (CMS/HCC)   • Type 2 diabetes mellitus with peripheral neuropathy (CMS/HCC)   • Serum potassium elevated   • Elevated liver function tests   • Falls   • Near syncope       Therapy Treatment    Rehabilitation Treatment Summary     Row Name 19 0850             Treatment Time/Intention    Discipline  physical therapy assistant  -EH      Document Type  therapy note (daily note)  -EH      Subjective Information  no complaints  -      Mode of Treatment  physical therapy  -EH      Patient/Family Observations  pt supine in bed with HOB elevated  -      Care Plan Review  patient/other agree to care plan  -EH      Therapy Frequency (PT Clinical Impression)  daily  -EH      Patient Effort  good  -      Existing  Precautions/Restrictions  fall  -EH      Recorded by [] Pat Natarajan, PTA 05/08/19 0852      Row Name 05/08/19 0850             Cognitive Assessment/Intervention- PT/OT    Orientation Status (Cognition)  oriented x 3  -EH      Follows Commands (Cognition)  WFL  -      Personal Safety Interventions  fall prevention program maintained;gait belt;nonskid shoes/slippers when out of bed  -EH      Recorded by [] Pat Natarajan, PTA 05/08/19 0852      Row Name 05/08/19 0850             Bed Mobility Assessment/Treatment    Supine-Sit [place] (Bed Mobility)  supervision  -      Sit-Supine [place] (Bed Mobility)  supervision  -      Recorded by [] Pat Natarajan, [facility] 05/08/19 0852      Row Name 05/08/19 0850             Sit-Stand Transfer    Sit-Stand [place] (Transfers)  supervision  -      Assistive Device (Sit-Stand Transfers)  walker, front-wheeled  -EH      Recorded by [] Pat Natarajan, [facility] [place] [facility] 05/08/19 0852      Row Name 05/08/19 0850             Stand-Sit Transfer    Stand-Sit [place] (Transfers)  supervision  -      Assistive Device (Stand-Sit Transfers)  walker, front-wheeled  -EH      Recorded by [] Pat Natarajan, [facility] [place] [facility] 05/08/19 0852      Row Name 05/08/19 0850             Gait/Stairs Assessment/Training    [place] Level (Gait)  contact guard  -      Assistive Device (Gait)  walker, front-wheeled  -EH      Distance in Feet (Gait)  150 declined to ambulate further  -      Pattern (Gait)  step-through  -      Deviations/Abnormal Patterns (Gait)  ataxic  -EH      Bilateral Gait Deviations  forward flexed posture  -EH      Recorded by [] Pat Natarajan, [facility] [place] [facility] 05/08/19 0852      Row Name 05/08/19 0850             Therapeutic Exercise    Lower Extremity (Therapeutic Exercise)  LAQ (long arc quad), bilateral;marching while seated  -      Exercise Type (Therapeutic Exercise)  AROM (active range of motion)  -      Position (Therapeutic Exercise)  seated  -      Sets/Reps  (Therapeutic Exercise)  2X10  -EH      Recorded by [] Rosa Isela Pat, [facility] 05/08/19 0852      Row Name 05/08/19 0850             Positioning and Restraints    Pre-Treatment Position  in bed  -      Post Treatment Position  bed  -      In Bed  supine;call light within reach;encouraged to call for assist;exit alarm on  -EH      Recorded by [] Rosa Isela Pat, [facility] 05/08/19 0852        User Key  (r) = Recorded By, (t) = Taken By, (c) = Cosigned By    Initials Name Effective Dates Discipline     Rosa Isela Pat, [facility] 08/19/18 -  PT                   Physical Therapy Education     Title: PT OT SLP Therapies (In Progress)     Topic: Physical Therapy (Done)     Point: Mobility training (Done)     Learning Progress Summary           Patient Acceptance, E,D, VU,NR by  at 5/8/2019  8:49 AM    Acceptance, E,D, NR by  at 5/7/2019  4:46 PM    Acceptance, E,D, VU,NR by MS at 5/6/2019 10:02 AM                   Point: Home exercise program (Done)     Learning Progress Summary           Patient Acceptance, E,D, VU,NR by  at 5/8/2019  8:49 AM    Acceptance, E,D, VU,NR by MS at 5/6/2019 10:02 AM                   Point: Body mechanics (Done)     Learning Progress Summary           Patient Acceptance, E,D, VU,NR by  at 5/8/2019  8:49 AM    Acceptance, E,D, NR by  at 5/7/2019  4:46 PM    Acceptance, E,D, VU,NR by MS at 5/6/2019 10:02 AM                   Point: Precautions (Done)     Learning Progress Summary           Patient Acceptance, E,D, VU,NR by  at 5/8/2019  8:49 AM    Acceptance, E,D, VU,NR by MS at 5/6/2019 10:02 AM                               User Key     Initials Effective Dates Name Provider Type Discipline    PC 04/03/18 -  Jaqueline Schneider, PT Physical Therapist PT    MS 04/03/18 -  Garth Rios, PT Physical Therapist PT     08/19/18 -  Pat Natarajan [facility] Physical Therapy Assistant PT                PT Recommendation and Plan  Therapy Frequency (PT Clinical Impression): daily     Outcome Measures      Row Name 05/08/19 0800 05/07/19 1600 05/07/19 1400       How much help from another person do you currently need...    Turning from your back to your side while in flat bed without using bedrails?  4  -EH  4  -PC  --    Moving from lying on back to sitting on the side of a flat bed without bedrails?  4  -EH  4  -PC  --    Moving to and from a bed to a chair (including a wheelchair)?  3  -EH  3  -PC  --    Standing up from a chair using your arms (e.g., wheelchair, bedside chair)?  3  -  3  -PC  --    Climbing 3-5 steps with a railing?  3  -EH  3  -PC  --    To walk in hospital room?  3  -  3  -PC  --    AM-PAC 6 Clicks Score  20  -EH  20  -PC  --       How much help from another is currently needed...    Putting on and taking off regular lower body clothing?  --  --  3  -CW    Bathing (including washing, rinsing, and drying)  --  --  2  -CW    Toileting (which includes using toilet bed pan or urinal)  --  --  3  -CW    Putting on and taking off regular upper body clothing  --  --  3  -CW    Taking care of personal grooming (such as brushing teeth)  --  --  3  -CW    Eating meals  --  --  3  -CW    Score  --  --  17  -CW       Functional Assessment    Outcome Measure Options  --  --  AM-PAC 6 Clicks Daily Activity (OT)  -CW    Row Name 05/06/19 1200 05/06/19 1000          How much help from another person do you currently need...    Turning from your back to your side while in flat bed without using bedrails?  --  4  -MS     Moving from lying on back to sitting on the side of a flat bed without bedrails?  --  3  -MS     Moving to and from a bed to a chair (including a wheelchair)?  --  3  -MS     Standing up from a chair using your arms (e.g., wheelchair, bedside chair)?  --  3  -MS     Climbing 3-5 steps with a railing?  --  2  -MS     To walk in hospital room?  --  3  -MS     AM-PAC 6 Clicks Score  --  18  -MS        How much help from another is currently needed...    Putting on and taking off regular lower  body clothing?  3  -MR  --     Bathing (including washing, rinsing, and drying)  2  -MR  --     Toileting (which includes using toilet bed pan or urinal)  3  -MR  --     Putting on and taking off regular upper body clothing  3  -MR  --     Taking care of personal grooming (such as brushing teeth)  3  -MR  --     Eating meals  3  -MR  --     Score  17  -MR  --        Functional Assessment    Outcome Measure Options  AM-PAC 6 Clicks Daily Activity (OT)  -MR  AM-PAC 6 Clicks Basic Mobility (PT)  -MS       User Key  (r) = Recorded By, (t) = Taken By, (c) = Cosigned By    Initials Name Provider Type    CW Ghazala Yoder, OTR Occupational Therapist    Jaqueline Negrete, PT Physical Therapist    MS Garth Rios, PT Physical Therapist    MR Rikki Nadya Kaufman, OT Occupational Therapist    Pat Catherine PTA Physical Therapy Assistant         Time Calculation:   PT Charges     Row Name 05/08/19 0848             Time Calculation    Start Time  0826  -      Stop Time  0843  -      Time Calculation (min)  17 min  -      PT Received On  05/08/19  -      PT - Next Appointment  05/09/19  -         Time Calculation- PT    Total Timed Code Minutes- PT  17 minute(s)  -        User Key  (r) = Recorded By, (t) = Taken By, (c) = Cosigned By    Initials Name Provider Type    Pat Catherine PTA Physical Therapy Assistant        Therapy Charges for Today     Code Description Service Date Service Provider Modifiers Qty    08194463651 HC PT THER PROC EA 15 MIN 5/8/2019 Pat Natarajan PTA GP 1          PT G-Codes  Outcome Measure Options: AM-PAC 6 Clicks Daily Activity (OT)  AM-PAC 6 Clicks Score: 20  Score: 17    Pat Natarajan PTA  5/8/2019

## 2019-05-08 NOTE — THERAPY TREATMENT NOTE
Acute Care - Occupational Therapy Treatment Note  [facility]     Patient Name: Andreas Rubin  : 1950  MRN: [id number]  Today's Date: 2019  Onset of Illness/Injury or Date of Surgery: 19     Referring Physician: Heidi Reyse    Admit Date: 2019       ICD-10-CM ICD-9-CM   1. Elevated liver function tests R94.5 790.6   2. Frequent falls R29.6 V15.88   3. Generalized weakness R53.1 780.79   4. Hyperglycemia R73.9 790.29   5. Muscle weakness (generalized) M62.81 728.87     Patient Active Problem List   Diagnosis   • Type 2 diabetes mellitus with complication, with long-term current use of insulin (CMS/HCC)   • Type 2 diabetes mellitus with diabetic retinopathy (CMS/HCC)   • Hyperlipidemia   • Essential hypertension   • Coronary artery disease involving native coronary artery of native heart without angina pectoris   • S/P angioplasty with stent   • Sleep apnea   • Right-sided carotid artery disease (CMS/HCC)   • Hypotension, postural   • Cardiomyopathy (CMS/HCC)   • Paroxysmal atrial fibrillation (CMS/HCC)   • S/P biventricular cardiac pacemaker procedure   • Falls   • Dizziness   • History of cerebellar stroke   • Cerebellar ataxia (CMS/HCC)   • Type 2 diabetes mellitus with peripheral neuropathy (CMS/HCC)   • Serum potassium elevated   • Elevated liver function tests   • Falls   • Near syncope     Past Medical History:   Diagnosis Date   • Allergic rhinitis    • ASHD (arteriosclerotic heart disease)    • Atrial fibrillation (CMS/HCC)    • AV block    • Cardiomyopathy (CMS/HCC)    • Cerebellar stroke (CMS/HCC)    • CHF (congestive heart failure) (CMS/HCC)    • Coronary artery disease    • Depression    • DM type 2 (diabetes mellitus, type 2) (CMS/HCC)    • Encounter for special screening examination for neoplasm of prostate    • Fatigue    • Hot flashes    • Hyperlipidemia    • Hypertension    • Hypotension    • Imbalance    • Intermittent claudication (CMS/HCC)    • Ischemic  cardiomyopathy    • PALMER (obstructive sleep apnea)    • S/P angioplasty with stent    • S/P biventricular cardiac pacemaker procedure    • Sleep apnea      Past Surgical History:   Procedure Laterality Date   • BUNIONECTOMY      BILATERAL FEET   • CARDIAC CATHETERIZATION Left 06/16/2011    [facility], Dr. Walt Saab, coronaries and LV gram   • CARDIOVERSION  08/20/2009    [facility], Dr. James, cardioverison of atrial flutter to sinus rhythm, reprogramming of pacemaker   • CAROTID STENT      EF=10-15%LEFT MAIN NORMAL , MILD LUMINAL IRR OF LED AND 20% DISEASE OF THE CIRCUMFLEX 80% LESIONIN THE PROX RCA THAT WAS ACTUALLY NONDOMINANT 2.5 X 18 MM VISION BARE METAL STENT IN THE MID RCA    • CATARACT EXTRACTION Bilateral    • COLONOSCOPY N/A 3/3/2017    Procedure: COLONOSCOPY TO CECUM;  Surgeon: Benton Castellanos MD;  Location: [facility] ENDOSCOPY;  Service:    • CORONARY ANGIOPLASTY WITH STENT PLACEMENT  06/16/2011    EF = 10-15%.  Left main was normal, mild luminal irregularities of LAD, and 20% disease of the circumflex.  80% lesion in the prox RCA that was actually nondominant.  2.5 x 18mm Vision bare metal stent in the mid RCA.   • ELBOW PROCEDURE      REMOVAL OF NODULE ON LEFT ELBOW   • KNEE ACL RECONSTRUCTION      LEFT KNEE   • [facility]   • PAC[facility]ER REPLACEMENT  11/12/2015       Therapy Treatment    Rehabilitation Treatment Summary     Row Name 05/08/19 0951 05/08/19 0850          Treatment Time/Intention    Discipline  occupational therapist  -SG  physical therapy assistant  -     Document Type  therapy note (daily note)  -  therapy note (daily note)  -     Subjective Information  no complaints  -SG  no complaints  -     Mode of Treatment  individual therapy;occupational therapy  -SG  physical therapy  -     Patient/Family Observations  Pt supine in bed, declines ADL  -SG  pt supine in bed with HOB  elevated  -     Care Plan Review  --  patient/other agree to care plan  -     Therapy Frequency (PT Clinical Impression)  --  daily  -EH     Patient Effort  good  -SG  good  -EH     Existing Precautions/Restrictions  fall  -  fall  -EH     Recorded by [SG] Pratima Eller OT 05/08/19 1000 [EH] Pat Natarajan, [facility] 05/08/19 0852     Row Name 05/08/19 0951 05/08/19 0850          Cognitive Assessment/Intervention- PT/OT    Orientation Status (Cognition)  oriented x 3  -SG  oriented x 3  -EH     Follows Commands (Cognition)  WFL  -SG  WFL  -     Personal Safety Interventions  gait belt;fall prevention program maintained  -  fall prevention program maintained;gait belt;nonskid shoes/slippers when out of bed  -EH     Recorded by [SG] Pratima Eller OT 05/08/19 1000 [EH] Pat Natarajan, [facility] 05/08/19 0852     Row Name 05/08/19 0951 05/08/19 0850          Bed Mobility Assessment/Treatment    Bed Mobility Assessment/Treatment  supine-sit;sit-supine  -  --     Supine-Sit [place] (Bed Mobility)  supervision  -  supervision  -     Sit-Supine [place] (Bed Mobility)  supervision  -  supervision  -     Recorded by [SG] Pratima lEler OTR 05/08/19 1000 [EH] Pat Natarajan, [facility] 05/08/19 0852     Row Name 05/08/19 0850             Sit-Stand Transfer    Sit-Stand [place] (Transfers)  supervision  -      Assistive Device (Sit-Stand Transfers)  walker, front-wheeled  -      Recorded by [] Pat Natarajan, [facility] 05/08/19 0852      Row Name 05/08/19 0850             Stand-Sit Transfer    Stand-Sit [place] (Transfers)  supervision  -      Assistive Device (Stand-Sit Transfers)  walker, front-wheeled  -      Recorded by [] Pat Natarajan [facility] 05/08/19 0852      Row Name 05/08/19 0850             Gait/Stairs Assessment/Training    [place] Level (Gait)  contact guard  -      Assistive Device (Gait)  walker, front-wheeled  -      Distance in Feet (Gait)  150 declined to ambulate  further  -      Pattern (Gait)  step-through  -      Deviations/Abnormal Patterns (Gait)  ataxic  -EH      Bilateral Gait Deviations  forward flexed posture  -EH      Recorded by [] Pat Natarajan, PTA 05/08/19 0852      Row Name 05/08/19 0951             Motor Skills Assessment/Interventions    Additional Documentation  Therapeutic Exercise (Group)  -SG      Recorded by [] Pratima Eller OTR 05/08/19 1000      Row Name 05/08/19 0951             Therapeutic Exercise    Therapeutic Exercise  seated, upper extremities  -SG      Additional Documentation  Therapeutic Exercise (Row)  -SG      Recorded by [] Pratima Eller, OTR 05/08/19 1000      Row Name 05/08/19 0951             Upper Extremity Seated Therapeutic Exercise    Performed, Seated Upper Extremity (Therapeutic Exercise)  shoulder flexion/extension;scapular protraction/retraction;elbow flexion/extension  -      Exercise Type, Seated Upper Extremity (Therapeutic Exercise)  AROM (active range of motion)  -      Expected Outcomes, Seated Upper Extremity (Therapeutic Exercise)  improve functional tolerance, self-care activity;improve functional tolerance, single extremity activity  -      Sets/Reps Detail, Seated Upper Extremity (Therapeutic Exercise)  2/10  -      Comment, Seated Upper Extremity (Therapeutic Exercise)  Cues to cont to task, pt does demo some fatigue  -SG      Recorded by [] Pratima Eller OTR 05/08/19 1000      Row Name 05/08/19 0850             Therapeutic Exercise    Lower Extremity (Therapeutic Exercise)  LAQ (long arc quad), bilateral;marching while seated  -      Exercise Type (Therapeutic Exercise)  AROM (active range of motion)  -      Position (Therapeutic Exercise)  seated  -      Sets/Reps (Therapeutic Exercise)  2X10  -EH      Recorded by [] Pat Natarajan, [facility] 05/08/19 0852      Row Name 05/08/19 0951 05/08/19 0850          Positioning and Restraints    Pre-Treatment Position  in bed  -SG  in bed  -      Post Treatment Position  bed  -SG  bed  -EH     In Bed  supine;call light within reach;encouraged to call for assist;exit alarm on  -SG  supine;call light within reach;encouraged to call for assist;exit alarm on  -EH     Recorded by [SG] Pratima Eller OTR 05/08/19 1000 [EH] Rosa Isela Pat, [facility] 05/08/19 0852     Row Name 05/08/19 0951             Pain Scale: Numbers Pre/Post-Treatment    Pain Scale: Numbers, Pretreatment  0/10 - no pain  -SG      Recorded by [SG] Pratima Eller, OTR 05/08/19 1000        User Key  (r) = Recorded By, (t) = Taken By, (c) = Cosigned By    Initials Name Effective Dates Discipline     Pratima Eller OTR 12/26/18 -  OT     SalvatorePat stevenson, [facility] 08/19/18 -  PT             Occupational Therapy Education     Title: PT OT SLP Therapies (In Progress)     Topic: Occupational Therapy (In Progress)     Point: ADL training (Done)     Description: Instruct learner(s) on proper safety adaptation and remediation techniques during self care or transfers.   Instruct in proper use of assistive devices.    Learning Progress Summary           Patient Acceptance, E, VU,NR by  at 5/7/2019  1:56 PM    Comment:  Ed. completed for sequence and safety during BCS transfer.    Acceptance, E,TB, VU by MR at 5/6/2019 12:47 PM    Comment:  Pt educated on role of OT; OT POC.                               User Key     Initials Effective Dates Name Provider Type Discipline     06/08/18 -  Ghazala Yoder OTR Occupational Therapist OT    MR 06/22/16 -  Nadya Brandt, OT Occupational Therapist OT                OT Recommendation and Plan     Plan of Care Review  Plan of Care Reviewed With: patient  Plan of Care Reviewed With: patient  Outcome Summary: Pt tolerates UE ther ex with mild fatigue.  Outcome Measures     Row Name 05/08/19 1000 05/08/19 0800 05/07/19 1600       How much help from another person do you currently need...    Turning from your back to your side while in flat bed without using  bedrails?  --  4  -EH  4  -PC    Moving from lying on back to sitting on the side of a flat bed without bedrails?  --  4  -EH  4  -PC    Moving to and from a bed to a chair (including a wheelchair)?  --  3  -EH  3  -PC    Standing up from a chair using your arms (e.g., wheelchair, bedside chair)?  --  3  -EH  3  -PC    Climbing 3-5 steps with a railing?  --  3  -EH  3  -PC    To walk in hospital room?  --  3  -EH  3  -PC    AM-PAC 6 Clicks Score  --  20  -EH  20  -PC       How much help from another is currently needed...    Putting on and taking off regular lower body clothing?  3  -SG  --  --    Bathing (including washing, rinsing, and drying)  3  -SG  --  --    Toileting (which includes using toilet bed pan or urinal)  3  -SG  --  --    Putting on and taking off regular upper body clothing  3  -SG  --  --    Taking care of personal grooming (such as brushing teeth)  3  -SG  --  --    Eating meals  3  -SG  --  --    Score  18  -SG  --  --       Functional Assessment    Outcome Measure Options  AM-PAC 6 Clicks Daily Activity (OT)  -SG  --  --    Row Name 05/07/19 1400 05/06/19 1200 05/06/19 1000       How much help from another person do you currently need...    Turning from your back to your side while in flat bed without using bedrails?  --  --  4  -MS    Moving from lying on back to sitting on the side of a flat bed without bedrails?  --  --  3  -MS    Moving to and from a bed to a chair (including a wheelchair)?  --  --  3  -MS    Standing up from a chair using your arms (e.g., wheelchair, bedside chair)?  --  --  3  -MS    Climbing 3-5 steps with a railing?  --  --  2  -MS    To walk in hospital room?  --  --  3  -MS    AM-PAC 6 Clicks Score  --  --  18  -MS       How much help from another is currently needed...    Putting on and taking off regular lower body clothing?  3  -CW  3  -MR  --    Bathing (including washing, rinsing, and drying)  2  -CW  2  -MR  --    Toileting (which includes using toilet bed pan  or urinal)  3  -CW  3  -MR  --    Putting on and taking off regular upper body clothing  3  -CW  3  -MR  --    Taking care of personal grooming (such as brushing teeth)  3  -CW  3  -MR  --    Eating meals  3  -CW  3  -MR  --    Score  17  -CW  17  -MR  --       Functional Assessment    Outcome Measure Options  AM-PAC 6 Clicks Daily Activity (OT)  -CW  AM-PAC 6 Clicks Daily Activity (OT)  -MR  AM-PAC 6 Clicks Basic Mobility (PT)  -MS      User Key  (r) = Recorded By, (t) = Taken By, (c) = Cosigned By    Initials Name Provider Type    Pratima Castillo, OTR Occupational Therapist    CW Ghazala Yoder, OTR Occupational Therapist    Jaqueline Negrete, PT Physical Therapist    MS Garth Rios, PT Physical Therapist    MR Rikki Nadya Kaufman, OT Occupational Therapist    Pat Catherine, PTA Physical Therapy Assistant           Time Calculation:   Time Calculation- OT     Row Name 05/08/19 1001             Time Calculation- OT    OT Start Time  0930  -SG      OT Stop Time  0942  -SG      OT Time Calculation (min)  12 min  -      Total Timed Code Minutes- OT  12 minute(s)  -SG      OT Received On  05/08/19  -        User Key  (r) = Recorded By, (t) = Taken By, (c) = Cosigned By    Initials Name Provider Type    Pratima Castillo OTR Occupational Therapist        Therapy Charges for Today     Code Description Service Date Service Provider Modifiers Qty    [id number] HC OT THER PROC EA 15 MIN 5/8/2019 Pratima Eller OTR GO 1               ANANYA Marks  5/8/2019

## 2019-05-08 NOTE — PROGRESS NOTES
Continued Stay Note  [facility]     Patient Name: Andreas Rubin  MRN: [id number]  Today's Date: 5/8/2019    Admit Date: 5/5/2019    Discharge Plan     Row Name 05/08/19 1406       Plan    Plan  Home with brother     Plan Comments  Spoke with Erika/Palliative, she is coming to talk to the pt and Nino. Spoke with Nino, pts brother, pt will return home with him. He is requesting a wheelchair. He would like to use [facility] as the provider, Nadya notified. Order requested from MD. COCHRAN to follow. TayREVANGELIST/CCP         Discharge Codes    No documentation.             Thi Issa RN

## 2019-05-08 NOTE — PROGRESS NOTES
[facility]     LOS: 0 days     Subjective:    CC:Weakness - Generalized    DIET:  Diet Order   Procedures   • Diet Regular; Cardiac, Consistent Carbohydrate     no cp  No soa  Eating \"a little\"    Objective:    Vital Signs:  Temp:  [97.1 °F (36.2 °C)-98.1 °F (36.7 °C)] 98 °F (36.7 °C)  Heart Rate:  [69-71] 71  Resp:  [16-20] 16  BP: (106-114)/(81-84) 114/84    SpO2:  [95 %-96 %] 96 %  on   ;   Device (Oxygen Therapy): room air  Body mass index is 26.41 kg/m².    Physical Exam   Constitutional: He appears well-developed and well-nourished.   HENT:   Head: Normocephalic and atraumatic.   Cardiovascular: Exam reveals no friction rub.   No murmur heard.  Pulmonary/Chest: Effort normal. He has no wheezes. He has no rales.   Decreased on the right   Abdominal: Soft. Bowel sounds are normal. He exhibits no distension. There is no tenderness.   Neurological: He is alert.   Skin: Skin is warm and dry.       Results Review:    Glucose   Date Value Ref Range Status   05/06/2019 134 (H) 65 - 99 mg/dL Final   05/05/2019 153 (H) 65 - 99 mg/dL Final     Results from last 7 days   Lab Units 05/06/19  0538   WBC 10*3/mm3 4.30   HEMOGLOBIN g/dL 15.0   HEMATOCRIT % 44.3   PLATELETS 10*3/mm3 146     Results from last 7 days   Lab Units 05/06/19  1801 05/06/19  0538 05/05/19  2133   SODIUM mmol/L  --  140 139   POTASSIUM mmol/L 4.3 3.3* 4.1   CHLORIDE mmol/L  --  97* 97*   CO2 mmol/L  --  30.3* 23.9   BUN mg/dL  --  12 13   CREATININE mg/dL  --  1.26 1.22   CALCIUM mg/dL  --  8.5* 8.9   BILIRUBIN mg/dL  --   --  2.5*   ALK PHOS U/L  --   --  234*   ALT (SGPT) U/L  --   --  40   AST (SGOT) U/L  --   --  63*   GLUCOSE mg/dL  --  134* 153*         Results from last 7 days   Lab Units 05/05/19  2133   MAGNESIUM mg/dL 2.4     Results from last 7 days   Lab Units 05/05/19  2133   CK TOTAL U/L 310*   TROPONIN T ng/mL 0.028     Cultures:       I have reviewed daily medications and changes in CPOE    Scheduled " meds    aspirin 81 mg Oral Daily   citalopram 20 mg Oral Daily   furosemide 40 mg Oral Daily   insulin glargine 20 Units Subcutaneous Q12H   insulin lispro 0-7 Units Subcutaneous 4x Daily With Meals & Nightly   potassium chloride 10 mEq Oral Daily   sodium chloride 3 mL Intravenous Q12H   spironolactone 25 mg Oral Daily          PRN meds  •  acetaminophen  •  dextrose  •  dextrose  •  glucagon (human recombinant)  •  nitroglycerin  •  ondansetron **OR** ondansetron  •  potassium chloride  •  potassium chloride  •  sodium chloride  •  [COMPLETED] Insert peripheral IV **AND** sodium chloride        Near syncope    Hyperlipidemia    Essential hypertension    Coronary artery disease involving native coronary artery of native heart without angina pectoris    Sleep apnea    Paroxysmal atrial fibrillation (CMS/HCC)    Type 2 diabetes mellitus with peripheral neuropathy (CMS/HCC)    Elevated liver function tests    Falls        Assessment/Plan:    Near syncope-ding better  -walking with PT  -seen by [facility]      Hyperlipidemia      Essential hypertension      Coronary artery disease involving native coronary artery of native heart without angina pectoris      Sleep apnea      Paroxysmal atrial fibrillation (CMS/HCC)      Type 2 diabetes mellitus with peripheral neuropathy (CMS/HCC)      Elevated liver function tests      Falls    DVT PPX:       Nawaf Núñez MD  05/08/19  10:05 AM

## 2019-05-08 NOTE — PLAN OF CARE
Problem: Patient Care Overview  Goal: Plan of Care Review  Outcome: Ongoing (interventions implemented as appropriate)   05/08/19 9351   Coping/Psychosocial   Plan of Care Reviewed With patient   OTHER   Outcome Summary Pt tolerated treatment with no complaints. Pt required supervision for bed mobility and sit/stand transfers. Pt ambulated 150 feet with rwx, CGA. Pt demos and ataxic gait pattern. Pt performed BLE exercises while seated on EOB.    Plan of Care Review   Progress improving

## 2019-05-09 ENCOUNTER — READMISSION MANAGEMENT (OUTPATIENT)
Dept: CALL CENTER | Facility: HOSPITAL | Age: 69
End: 2019-05-09

## 2019-05-09 NOTE — PROGRESS NOTES
Case Management Discharge Note    Final Note: Pt dc'd home     Destination      No service has been selected for the patient.      Durable Medical Equipment      No service has been selected for the patient.      Dialysis/Infusion      No service has been selected for the patient.      Home Medical Care      No service has been selected for the patient.      Therapy      No service has been selected for the patient.      Community Resources      No service has been selected for the patient.        Transportation Services  W/C Van: [employer]    Final Discharge Disposition Code: 01 - home or self-care

## 2019-05-09 NOTE — OUTREACH NOTE
Prep Survey      Responses   Facility patient discharged from?  [facility]   Is patient eligible?  Yes   Discharge diagnosis   Near syncope  Paroxysmal atrial fibrillation   Falls   Does the patient have one of the following disease processes/diagnoses(primary or secondary)?  Other   Does the patient have Home health ordered?  No   Is there a DME ordered?  No   Prep survey completed?  Yes          Nadya Deal RN

## 2019-05-10 ENCOUNTER — EPISODE CHANGES (OUTPATIENT)
Dept: CASE MANAGEMENT | Facility: OTHER | Age: 69
End: 2019-05-10

## 2019-05-10 ENCOUNTER — READMISSION MANAGEMENT (OUTPATIENT)
Dept: CALL CENTER | Facility: HOSPITAL | Age: 69
End: 2019-05-10

## 2019-05-10 NOTE — OUTREACH NOTE
Medical Week 1 Survey      Responses   Facility patient discharged from?  [place]   Does the patient have one of the following disease processes/diagnoses(primary or secondary)?  Other   Is there a successful TCM telephone encounter documented?  No   Week 1 attempt successful?  Yes   Revoke  Change in health status-moved to LTC/SNF/Hospice   General alerts for this patient  Hospice   Discharge diagnosis   Near syncope  Paroxysmal atrial fibrillation   Falls   Is patient permission given to speak with other caregiver?  Yes   Person spoke with today (if not patient) and relationship  Brother          Basilio Diamond RN

## 2019-05-13 ENCOUNTER — EPISODE CHANGES (OUTPATIENT)
Dept: CASE MANAGEMENT | Facility: OTHER | Age: 69
End: 2019-05-13

## 2019-05-16 DIAGNOSIS — I10 ESSENTIAL HYPERTENSION: ICD-10-CM

## 2019-05-16 RX ORDER — FUROSEMIDE 40 MG/1
TABLET ORAL
Qty: 30 TABLET | Refills: 5 | Status: SHIPPED | OUTPATIENT
Start: 2019-05-16

## 2019-05-17 ENCOUNTER — EPISODE CHANGES (OUTPATIENT)
Dept: CASE MANAGEMENT | Facility: OTHER | Age: 69
End: 2019-05-17

## 2019-05-24 ENCOUNTER — APPOINTMENT (OUTPATIENT)
Dept: SLEEP MEDICINE | Facility: HOSPITAL | Age: 69
End: 2019-05-24

## 2019-06-10 DIAGNOSIS — F32.A DEPRESSION: ICD-10-CM

## 2019-06-10 RX ORDER — POTASSIUM CHLORIDE 750 MG/1
TABLET, FILM COATED, EXTENDED RELEASE ORAL
Qty: 90 TABLET | Refills: 0 | Status: SHIPPED | OUTPATIENT
Start: 2019-06-10 | End: 2020-01-01

## 2019-06-10 RX ORDER — CITALOPRAM 20 MG/1
TABLET ORAL
Qty: 90 TABLET | Refills: 1 | Status: SHIPPED | OUTPATIENT
Start: 2019-06-10

## 2019-07-31 ENCOUNTER — CLINICAL SUPPORT NO REQUIREMENTS (OUTPATIENT)
Dept: CARDIOLOGY | Facility: CLINIC | Age: 69
End: 2019-07-31

## 2019-07-31 DIAGNOSIS — I25.5 ISCHEMIC CARDIOMYOPATHY: Primary | ICD-10-CM

## 2019-07-31 PROCEDURE — 93288 INTERROG EVL PM/LDLS PM IP: CPT | Performed by: INTERNAL MEDICINE

## 2019-10-13 DIAGNOSIS — Z79.4 TYPE 2 DIABETES MELLITUS WITH COMPLICATION, WITH LONG-TERM CURRENT USE OF INSULIN (HCC): ICD-10-CM

## 2019-10-13 DIAGNOSIS — E11.8 TYPE 2 DIABETES MELLITUS WITH COMPLICATION, WITH LONG-TERM CURRENT USE OF INSULIN (HCC): ICD-10-CM

## 2019-10-15 DIAGNOSIS — Z79.4 TYPE 2 DIABETES MELLITUS WITH COMPLICATION, WITH LONG-TERM CURRENT USE OF INSULIN (HCC): ICD-10-CM

## 2019-10-15 DIAGNOSIS — E11.8 TYPE 2 DIABETES MELLITUS WITH COMPLICATION, WITH LONG-TERM CURRENT USE OF INSULIN (HCC): ICD-10-CM

## 2019-10-21 DIAGNOSIS — E11.8 TYPE 2 DIABETES MELLITUS WITH COMPLICATION, WITH LONG-TERM CURRENT USE OF INSULIN (HCC): ICD-10-CM

## 2019-10-21 DIAGNOSIS — Z79.4 TYPE 2 DIABETES MELLITUS WITH COMPLICATION, WITH LONG-TERM CURRENT USE OF INSULIN (HCC): ICD-10-CM

## 2019-11-11 ENCOUNTER — OFFICE VISIT (OUTPATIENT)
Dept: CARDIOLOGY | Facility: CLINIC | Age: 69
End: 2019-11-11

## 2019-11-11 ENCOUNTER — CLINICAL SUPPORT NO REQUIREMENTS (OUTPATIENT)
Dept: CARDIOLOGY | Facility: CLINIC | Age: 69
End: 2019-11-11

## 2019-11-11 VITALS
HEART RATE: 72 BPM | SYSTOLIC BLOOD PRESSURE: 104 MMHG | HEIGHT: 73 IN | BODY MASS INDEX: 24.65 KG/M2 | DIASTOLIC BLOOD PRESSURE: 68 MMHG | WEIGHT: 186 LBS

## 2019-11-11 DIAGNOSIS — I25.5 ISCHEMIC CARDIOMYOPATHY: Primary | ICD-10-CM

## 2019-11-11 DIAGNOSIS — Z95.0 S/P BIVENTRICULAR CARDIAC PACEMAKER PROCEDURE: Primary | ICD-10-CM

## 2019-11-11 PROCEDURE — 93290 INTERROG DEV EVAL ICPMS IP: CPT | Performed by: INTERNAL MEDICINE

## 2019-11-11 PROCEDURE — 99212 OFFICE O/P EST SF 10 MIN: CPT | Performed by: INTERNAL MEDICINE

## 2019-11-11 PROCEDURE — 93281 PM DEVICE PROGR EVAL MULTI: CPT | Performed by: INTERNAL MEDICINE

## 2019-11-11 PROCEDURE — 93000 ELECTROCARDIOGRAM COMPLETE: CPT | Performed by: INTERNAL MEDICINE

## 2019-11-11 RX ORDER — INFLUENZA VACCINE, ADJUVANTED 15; 15; 15 UG/.5ML; UG/.5ML; UG/.5ML
INJECTION, SUSPENSION INTRAMUSCULAR
Refills: 0 | COMMUNITY
Start: 2019-10-03 | End: 2020-01-01

## 2019-11-11 NOTE — PROGRESS NOTES
Date of Office Visit: 2019  Encounter Provider: Regino James MD  Place of Service: [facility]  Patient Name: Andreas Rubin  : 1950    Subjective:     Encounter Date:2019      Patient ID: Andreas Rubin is a 69 y.o. male who has a cc of   end-stage heart failure who is on hospice.  He is been seeing me for many years and wanted to come in and see me.    It is quite shocking actually but he is reasonably comfortable.  His brother says that he has a good morning but then very tired in the afternoon.  He has not been in the hospital since the summer.  He takes basically just the diuretic and some potassium replacement.  He has hospice coming in and they are very has to happy with the hospice service.    He has a CR TP    Past Medical History:   Diagnosis Date   • Allergic rhinitis    • ASHD (arteriosclerotic heart disease)    • Atrial fibrillation (CMS/HCC)    • AV block    • Cardiomyopathy (CMS/HCC)    • Cerebellar stroke (CMS/HCC)    • CHF (congestive heart failure) (CMS/HCC)    • Coronary artery disease    • Depression    • DM type 2 (diabetes mellitus, type 2) (CMS/HCC)    • Encounter for special screening examination for neoplasm of prostate    • Fatigue    • Hot flashes    • Hyperlipidemia    • Hypertension    • Hypotension    • Imbalance    • Intermittent claudication (CMS/HCC)    • Ischemic cardiomyopathy    • PALMER (obstructive sleep apnea)    • S/P angioplasty with stent    • S/P biventricular cardiac pacemaker procedure    • Sleep apnea        Social History     Socioeconomic History   • Marital status: Single     Spouse name: Not on file   • Number of children: Not on file   • Years of education: Not on file   • Highest education level: Not on file   Tobacco Use   • Smoking status: Never Smoker   • Smokeless tobacco: Never Used   • Tobacco comment: caffeine use   Substance and Sexual Activity   • Alcohol use: Yes     Comment: ocasional  "  • Drug use: No       Review of Systems   Constitution: Negative for fever and night sweats.   HENT: Negative for ear pain and stridor.    Eyes: Negative for discharge and visual halos.   Cardiovascular: Negative for cyanosis.   Respiratory: Negative for hemoptysis and sputum production.    Hematologic/Lymphatic: Negative for adenopathy.   Skin: Negative for nail changes and unusual hair distribution.   Musculoskeletal: Negative for gout and joint swelling.   Gastrointestinal: Negative for bowel incontinence and flatus.   Genitourinary: Negative for dysuria and flank pain.   Neurological: Negative for seizures and tremors.   Psychiatric/Behavioral: Negative for altered mental status. The patient is not nervous/anxious.             Objective:     Vitals:    11/11/19 0926   BP: 104/68   BP Location: Right arm   Patient Position: Sitting   Cuff Size: Large Adult   Pulse: 72   Weight: 84.4 kg (186 lb)   Height: 185.4 cm (73\")         Physical Exam   Constitutional: He is oriented to person, place, and time.   HENT:   Head: Normocephalic and atraumatic.   Eyes: Right eye exhibits no discharge. Left eye exhibits no discharge.   Neck: No JVD present. No thyromegaly present.   Cardiovascular: Normal rate and regular rhythm. Exam reveals gallop and S3. Exam reveals no friction rub.   Murmur heard.   Systolic murmur is present with a grade of 3/6.  Pulmonary/Chest: Effort normal and breath sounds normal. He has no rales.   Abdominal: Soft. Bowel sounds are normal. There is no tenderness.   Musculoskeletal: Normal range of motion. He exhibits no edema or deformity.   Neurological: He is alert and oriented to person, place, and time. He exhibits normal muscle tone.   Skin: Skin is warm and dry. No erythema.   Psychiatric: He has a normal mood and affect. His behavior is normal. Thought content normal.         ECG 12 Lead  Date/Time: 11/11/2019 2:24 PM  Performed by: Regino James MD  Authorized by: Regino James MD "   Comparison: compared with previous ECG   Similar to previous ECG  Rhythm: paced            Lab Review:       Assessment:          Diagnosis Plan   1. S/P biventricular cardiac pacemaker procedure            Plan:       For patient on hospice service for end-stage heart failure I think he is doing well.  He is comfortable.  His color is good although he does have weight loss and cardiac cachexia.  I would recommend continuing with hospice care.  He is actually flourishing on it.    I have stopped his atorvastatin.  It makes no sense to use a preventive medication and the patient on a hospice service.

## 2020-01-01 ENCOUNTER — OFFICE VISIT (OUTPATIENT)
Dept: CARDIOLOGY | Facility: CLINIC | Age: 70
End: 2020-01-01

## 2020-01-01 VITALS
SYSTOLIC BLOOD PRESSURE: 122 MMHG | HEART RATE: 71 BPM | BODY MASS INDEX: 26.01 KG/M2 | DIASTOLIC BLOOD PRESSURE: 75 MMHG | HEIGHT: 72 IN | WEIGHT: 192 LBS

## 2020-01-01 DIAGNOSIS — I25.5 ISCHEMIC CARDIOMYOPATHY: Primary | ICD-10-CM

## 2020-01-01 DIAGNOSIS — Z95.820 S/P ANGIOPLASTY WITH STENT: ICD-10-CM

## 2020-01-01 DIAGNOSIS — Z79.4 TYPE 2 DIABETES MELLITUS WITH COMPLICATION, WITH LONG-TERM CURRENT USE OF INSULIN (HCC): ICD-10-CM

## 2020-01-01 DIAGNOSIS — I25.10 CORONARY ARTERY DISEASE INVOLVING NATIVE CORONARY ARTERY OF NATIVE HEART WITHOUT ANGINA PECTORIS: ICD-10-CM

## 2020-01-01 DIAGNOSIS — I10 ESSENTIAL HYPERTENSION: ICD-10-CM

## 2020-01-01 DIAGNOSIS — Z95.0 S/P BIVENTRICULAR CARDIAC PACEMAKER PROCEDURE: ICD-10-CM

## 2020-01-01 DIAGNOSIS — I48.0 PAROXYSMAL ATRIAL FIBRILLATION (HCC): ICD-10-CM

## 2020-01-01 DIAGNOSIS — E11.8 TYPE 2 DIABETES MELLITUS WITH COMPLICATION, WITH LONG-TERM CURRENT USE OF INSULIN (HCC): ICD-10-CM

## 2020-01-01 PROCEDURE — 99442 PR PHYS/QHP TELEPHONE EVALUATION 11-20 MIN: CPT | Performed by: NURSE PRACTITIONER

## 2020-05-11 NOTE — PROGRESS NOTES
"Date of Office Visit: 2020  Encounter Provider: LAM Brown  Place of Service: [facility] CARDIOLOGY  Patient Name: Andreas Rubin  :1950    Chief Complaint   Patient presents with   • Follow-up   • Cardiomyopathy   :     HPI: Andreas Rubin is a 69 y.o. male who is a patient of Dr. James's with ICM, s/p CRT-P, CAD, s/p stent (), PAF (no AC secondary to falls and no PAF on device), intellectual disability, cerebellar ataxia, CVA and PALMER/CPAP.     He was last seen by Dr. James in the office in 2019. He had transitioned to hospice but was actually doing well and was stable.     He was scheduled to come into office today for a routine follow up visit but secondary to COVID 19 pandemic we are doing a telehealth visit.    This patient/caregiver has consented to a telehealth visit via telephone/audio. The visit was scheduled as a telehealth visit to comply with patient safety concerns in accordance with CDC recommendations.  All vitals recorded within this visit are reported by the patient.  I spent  18 minutes in total including but not limited to the 11 minutes spent in direct conversation with this patient.     He had a remote device check in Feb which showed A paced 90%, Biv paced 98.4% and no arrhythmia episodes. He was scheduled for an in clinic check today but cancelled secondary to the pandemic so will get a remote scheduled soon.     I spoke with patient, he denies any chest pain or shortness of breath. He lived with his brother who was his caregiver--he told me his brother  last month from a \"heart attack.\"    I spoke with his caregiver who stays with him --Ginette Sterling---she also reports that he is doing well. He does not complain of chest pain or shortness of breath to her. She says he does still sleep a lot but this is unchanged.     He is still being followed by hospice--they were coming once a week but now every other week " with the pandemic but an aide comes to help him weekly with his shower.     Past Medical History:   Diagnosis Date   • Allergic rhinitis    • ASHD (arteriosclerotic heart disease)    • Atrial fibrillation (CMS/HCC)    • AV block    • Cardiomyopathy (CMS/HCC)    • Cerebellar stroke (CMS/HCC)    • CHF (congestive heart failure) (CMS/HCC)    • Coronary artery disease    • Depression    • DM type 2 (diabetes mellitus, type 2) (CMS/HCC)    • Encounter for special screening examination for neoplasm of prostate    • Fatigue    • Hot flashes    • Hyperlipidemia    • Hypertension    • Hypotension    • Imbalance    • Intermittent claudication (CMS/HCC)    • Ischemic cardiomyopathy    • PALMER (obstructive sleep apnea)    • PAF (paroxysmal atrial fibrillation) (CMS/HCC)    • S/P angioplasty with stent    • S/P biventricular cardiac pacemaker procedure    • Sleep apnea        Past Surgical History:   Procedure Laterality Date   • BUNIONECTOMY      BILATERAL FEET   • CARDIAC CATHETERIZATION Left 06/16/2011    [facility], Dr. Walt Saab, coronaries and LV gram   • CARDIOVERSION  08/20/2009    [facility], Dr. James, cardioverison of atrial flutter to sinus rhythm, reprogramming of pacemaker   • CAROTID STENT      EF=10-15%LEFT MAIN NORMAL , MILD LUMINAL IRR OF LED AND 20% DISEASE OF THE CIRCUMFLEX 80% LESIONIN THE PROX RCA THAT WAS ACTUALLY NONDOMINANT 2.5 X 18 MM VISION BARE METAL STENT IN THE MID RCA    • CATARACT EXTRACTION Bilateral    • COLONOSCOPY N/A 3/3/2017    Procedure: COLONOSCOPY TO CECUM;  Surgeon: Benton Castellanos MD;  Location: [facility] ENDOSCOPY;  Service:    • CORONARY ANGIOPLASTY WITH STENT PLACEMENT  06/16/2011    EF = 10-15%.  Left main was normal, mild luminal irregularities of LAD, and 20% disease of the circumflex.  80% lesion in the prox RCA that was actually nondominant.  2.5 x 18mm Vision bare metal stent in the mid RCA.   • ELBOW PROCEDURE      REMOVAL OF NODULE ON LEFT  ELBOW   • KNEE ACL RECONSTRUCTION      LEFT KNEE   • PACEMAKER IMPLANTATION  11/12/2015    [facility], Dr. James   • PACEMAKER REPLACEMENT  11/12/2015       Social History     Socioeconomic History   • Marital status: Single     Spouse name: Not on file   • Number of children: Not on file   • Years of education: Not on file   • Highest education level: Not on file   Tobacco Use   • Smoking status: Never Smoker   • Smokeless tobacco: Never Used   • Tobacco comment: no caffeine use   Substance and Sexual Activity   • Alcohol use: Yes     Comment: ocasional   • Drug use: No       Family History   Problem Relation Age of Onset   • Lung cancer Mother    • Stroke Father    • Heart attack Father    • Lung cancer Father    • Diabetes Brother    • Hyperlipidemia Brother    • Diabetes Brother    • Throat cancer Brother        Review of Systems   Constitution: Negative for chills, fever and malaise/fatigue.   Cardiovascular: Negative for chest pain, dyspnea on exertion, leg swelling, near-syncope, orthopnea, palpitations, paroxysmal nocturnal dyspnea and syncope.   Respiratory: Negative for cough and shortness of breath.    Musculoskeletal: Negative for joint pain, joint swelling and myalgias.   Gastrointestinal: Negative for abdominal pain, diarrhea, melena, nausea and vomiting.   Genitourinary: Negative for frequency and hematuria.   Neurological: Negative for light-headedness, numbness, paresthesias and seizures.   Allergic/Immunologic: Negative.    All other systems reviewed and are negative.      Allergies   Allergen Reactions   • Codeine Diarrhea     diarrhea   • Codeine Sulfate          Current Outpatient Medications:   •  acetaminophen (TYLENOL) 325 MG tablet, Take 2 tablets by mouth Every 4 (Four) Hours As Needed for Mild Pain ., Disp: , Rfl:   •  aspirin 81 MG tablet, Take 1 tablet by mouth Daily., Disp: 30 tablet, Rfl: 11  •  atorvastatin (LIPITOR) 40 MG tablet, ALTERNATE 1 TABLET AND 1/2 TABLET  "DAILY, Disp: 30 tablet, Rfl: 3  •  JERRY MICROLET LANCETS lancets, daily., Disp: , Rfl:   •  citalopram (CeleXA) 20 MG tablet, TAKE 1 TABLET BY MOUTH EVERY DAY, Disp: 90 tablet, Rfl: 1  •  furosemide (LASIX) 40 MG tablet, TAKE 1 TABLET BY MOUTH EVERY DAY, Disp: 30 tablet, Rfl: 5  •  glucose blood (ONE TOUCH ULTRA TEST) test strip, TESTING BS 1 X DAY DX CODE E11.8, Disp: 100 each, Rfl: 1  •  metFORMIN (GLUCOPHAGE) 500 MG tablet, TAKE 1 TABLET BY MOUTH TWICE A DAY WITH MEALS, Disp: 180 tablet, Rfl: 1  •  Multiple Vitamins-Minerals (MULTI FOR HIM) capsule, Take 1 tablet/day by mouth daily., Disp: , Rfl:   •  nitroglycerin (NITROSTAT) 0.4 MG SL tablet, Place under the tongue., Disp: , Rfl:   •  rOPINIRole (REQUIP) 1 MG tablet, Take 1 tablet by mouth., Disp: , Rfl:   •  spironolactone (ALDACTONE) 25 MG tablet, TAKE 1 TABLET DAILY., Disp: 30 tablet, Rfl: 5      Objective:     Vitals:    05/11/20 0847   BP: 122/75   BP Location: Right arm   Patient Position: Sitting   Cuff Size: Adult   Pulse: 71   Weight: 87.1 kg (192 lb)   Height: 182.9 cm (72\")     Body mass index is 26.04 kg/m².    PHYSICAL EXAM: Audio telehealth visit.    Vitals Reviewed.     Respiratory: No signs of respiratory distress during audio visit.       Psychiatric: Patient alert and oriented, answered questions appropriately.     Assessment:       Diagnosis Plan   1. Ischemic cardiomyopathy     2. S/P biventricular cardiac pacemaker procedure     3. Coronary artery disease involving native coronary artery of native heart without angina pectoris     4. S/P angioplasty with stent     5. Essential hypertension     6. Paroxysmal atrial fibrillation (CMS/HCC)            Plan:       1.-2. ICM, s/p CRT-P. Remote check in Feb showed normal function, no arrhythmia episodes. Will get another remote scheduled this month and clinic and appt in 3 months. No complaints of volume overload.     3.-4. CAD, s/p stent 2011. No angina.    5. HTN, controlled.     6. PAF, no " episodes per device interrogation in Feb. No AC secondary to falls and cerebellar ataxia.     Remote check this month and appt with in clinic check in 3 months.     As always, it has been a pleasure to participate in your patient's care.      Sincerely,         LAM Shaw

## 2021-01-01 ENCOUNTER — HOSPITAL ENCOUNTER (EMERGENCY)
Facility: HOSPITAL | Age: 71
Discharge: HOME | End: 2021-04-24
Attending: EMERGENCY MEDICINE | Admitting: EMERGENCY MEDICINE

## 2021-01-01 ENCOUNTER — BULK ORDERING (OUTPATIENT)
Dept: CASE MANAGEMENT | Facility: OTHER | Age: 71
End: 2021-01-01

## 2021-01-01 ENCOUNTER — TELEPHONE (OUTPATIENT)
Dept: INTERNAL MEDICINE | Facility: CLINIC | Age: 71
End: 2021-01-01

## 2021-01-01 ENCOUNTER — APPOINTMENT (OUTPATIENT)
Dept: GENERAL RADIOLOGY | Facility: HOSPITAL | Age: 71
End: 2021-01-01

## 2021-01-01 ENCOUNTER — APPOINTMENT (OUTPATIENT)
Dept: CT IMAGING | Facility: HOSPITAL | Age: 71
End: 2021-01-01

## 2021-01-01 VITALS
BODY MASS INDEX: 24.38 KG/M2 | OXYGEN SATURATION: 91 % | DIASTOLIC BLOOD PRESSURE: 95 MMHG | TEMPERATURE: 98.2 F | WEIGHT: 190 LBS | HEIGHT: 74 IN | SYSTOLIC BLOOD PRESSURE: 123 MMHG | RESPIRATION RATE: 18 BRPM | HEART RATE: 75 BPM

## 2021-01-01 DIAGNOSIS — Z23 IMMUNIZATION DUE: ICD-10-CM

## 2021-01-01 DIAGNOSIS — S01.01XA LACERATION OF OCCIPITAL SCALP, INITIAL ENCOUNTER: Primary | ICD-10-CM

## 2021-01-01 LAB
ALBUMIN SERPL-MCNC: 3.7 G/DL (ref 3.5–5.2)
ALBUMIN/GLOB SERPL: 1 G/DL
ALP SERPL-CCNC: 780 U/L (ref 39–117)
ALT SERPL W P-5'-P-CCNC: 11 U/L (ref 1–41)
ANION GAP SERPL CALCULATED.3IONS-SCNC: 13 MMOL/L (ref 5–15)
AST SERPL-CCNC: 27 U/L (ref 1–40)
BASOPHILS # BLD AUTO: 0.05 10*3/MM3 (ref 0–0.2)
BASOPHILS NFR BLD AUTO: 0.6 % (ref 0–1.5)
BILIRUB SERPL-MCNC: 0.7 MG/DL (ref 0–1.2)
BUN SERPL-MCNC: 32 MG/DL (ref 8–23)
BUN/CREAT SERPL: 48.5 (ref 7–25)
CALCIUM SPEC-SCNC: 9.2 MG/DL (ref 8.6–10.5)
CHLORIDE SERPL-SCNC: 98 MMOL/L (ref 98–107)
CO2 SERPL-SCNC: 26 MMOL/L (ref 22–29)
CREAT SERPL-MCNC: 0.66 MG/DL (ref 0.76–1.27)
DEPRECATED RDW RBC AUTO: 45.2 FL (ref 37–54)
EOSINOPHIL # BLD AUTO: 0.03 10*3/MM3 (ref 0–0.4)
EOSINOPHIL NFR BLD AUTO: 0.4 % (ref 0.3–6.2)
ERYTHROCYTE [DISTWIDTH] IN BLOOD BY AUTOMATED COUNT: 13.2 % (ref 12.3–15.4)
GFR SERPL CREATININE-BSD FRML MDRD: 120 ML/MIN/1.73
GLOBULIN UR ELPH-MCNC: 3.7 GM/DL
GLUCOSE SERPL-MCNC: 376 MG/DL (ref 65–99)
HCT VFR BLD AUTO: 47.6 % (ref 37.5–51)
HGB BLD-MCNC: 15.5 G/DL (ref 13–17.7)
HOLD SPECIMEN: NORMAL
HOLD SPECIMEN: NORMAL
IMM GRANULOCYTES # BLD AUTO: 0.04 10*3/MM3 (ref 0–0.05)
IMM GRANULOCYTES NFR BLD AUTO: 0.5 % (ref 0–0.5)
LYMPHOCYTES # BLD AUTO: 0.39 10*3/MM3 (ref 0.7–3.1)
LYMPHOCYTES NFR BLD AUTO: 4.8 % (ref 19.6–45.3)
MCH RBC QN AUTO: 30.5 PG (ref 26.6–33)
MCHC RBC AUTO-ENTMCNC: 32.6 G/DL (ref 31.5–35.7)
MCV RBC AUTO: 93.5 FL (ref 79–97)
MONOCYTES # BLD AUTO: 1.15 10*3/MM3 (ref 0.1–0.9)
MONOCYTES NFR BLD AUTO: 14.1 % (ref 5–12)
NEUTROPHILS NFR BLD AUTO: 6.48 10*3/MM3 (ref 1.7–7)
NEUTROPHILS NFR BLD AUTO: 79.6 % (ref 42.7–76)
NRBC BLD AUTO-RTO: 0 /100 WBC (ref 0–0.2)
PLATELET # BLD AUTO: 240 10*3/MM3 (ref 140–450)
PMV BLD AUTO: 10.7 FL (ref 6–12)
POTASSIUM SERPL-SCNC: 4.9 MMOL/L (ref 3.5–5.2)
PROT SERPL-MCNC: 7.4 G/DL (ref 6–8.5)
RBC # BLD AUTO: 5.09 10*6/MM3 (ref 4.14–5.8)
SODIUM SERPL-SCNC: 137 MMOL/L (ref 136–145)
WBC # BLD AUTO: 8.14 10*3/MM3 (ref 3.4–10.8)
WHOLE BLOOD HOLD SPECIMEN: NORMAL
WHOLE BLOOD HOLD SPECIMEN: NORMAL

## 2021-01-01 PROCEDURE — 80053 COMPREHEN METABOLIC PANEL: CPT | Performed by: EMERGENCY MEDICINE

## 2021-01-01 PROCEDURE — 99283 EMERGENCY DEPT VISIT LOW MDM: CPT

## 2021-01-01 PROCEDURE — 72125 CT NECK SPINE W/O DYE: CPT

## 2021-01-01 PROCEDURE — 73502 X-RAY EXAM HIP UNI 2-3 VIEWS: CPT

## 2021-01-01 PROCEDURE — 85025 COMPLETE CBC W/AUTO DIFF WBC: CPT | Performed by: EMERGENCY MEDICINE

## 2021-01-01 PROCEDURE — 70450 CT HEAD/BRAIN W/O DYE: CPT

## 2021-01-01 RX ORDER — LIDOCAINE HYDROCHLORIDE AND EPINEPHRINE 10; 10 MG/ML; UG/ML
10 INJECTION, SOLUTION INFILTRATION; PERINEURAL ONCE
Status: DISCONTINUED | OUTPATIENT
Start: 2021-01-01 | End: 2021-01-01 | Stop reason: HOSPADM

## 2021-01-01 RX ORDER — SODIUM CHLORIDE 0.9 % (FLUSH) 0.9 %
10 SYRINGE (ML) INJECTION AS NEEDED
Status: DISCONTINUED | OUTPATIENT
Start: 2021-01-01 | End: 2021-01-01 | Stop reason: HOSPADM

## 2021-01-01 RX ORDER — BUPIVACAINE HYDROCHLORIDE 5 MG/ML
10 INJECTION, SOLUTION EPIDURAL; INTRACAUDAL ONCE
Status: DISCONTINUED | OUTPATIENT
Start: 2021-01-01 | End: 2021-01-01 | Stop reason: HOSPADM

## 2021-04-24 NOTE — ED PROVIDER NOTES
EMERGENCY DEPARTMENT ENCOUNTER    Room Number:  23/23  Date of encounter:  4/26/2021  PCP: Luis Dsouza Jr., MD  Historian: Patient and caregiver      HPI:  Chief Complaint: Fall with head injury  A complete HPI/ROS/PMH/PSH/SH/FH are unobtainable due to: Dementia    Context: Andreas Rubin is a 70 y.o. male who presents to the ED c/o mechanical fall at home, witnessed by caregiver.  It is described that the patient got his feet tangled up and fell.  He fell backwards and hit his head on the floor.  He did not lose consciousness, has no other injuries, and has lacerations to the scalp.    Patient has a history of dementia with relatively frequent falls.  He has cerebellar ataxia, but is not chronically anticoagulated because of his propensity for falls.  He appears to be acting normally at this time, as supported by his full-time caretaker who is at bedside.      PAST MEDICAL HISTORY  Active Ambulatory Problems     Diagnosis Date Noted   • Type 2 diabetes mellitus with complication, with long-term current use of insulin (CMS/[facility]) 08/10/2016   • Type 2 diabetes mellitus with diabetic retinopathy (CMS/[facility]) 08/10/2016   • Hyperlipidemia 08/10/2016   • Essential hypertension 08/10/2016   • Coronary artery disease involving native coronary artery of native heart without angina pectoris 08/10/2016   • S/P angioplasty with stent 08/10/2016   • Sleep apnea 08/10/2016   • Right-sided carotid artery disease (CMS/[facility]) 07/03/2017   • Hypotension, postural 07/06/2017   • Cardiomyopathy (CMS/[facility]) 07/06/2017   • Paroxysmal atrial fibrillation (CMS/[facility]) 07/06/2017   • S/P biventricular cardiac pacemaker procedure 07/06/2017   • Falls 10/18/2017   • Dizziness 10/18/2017   • History of cerebellar stroke 12/14/2017   • Cerebellar ataxia (CMS/[facility]) 12/21/2017   • Type 2 diabetes mellitus with peripheral neuropathy (CMS/[facility]) 05/03/2018   • Serum potassium elevated 10/16/2018   • Elevated liver function tests 05/05/2019   • Falls  05/05/2019   • Near syncope 05/05/2019     Resolved Ambulatory Problems     Diagnosis Date Noted   • No Resolved Ambulatory Problems     Past Medical History:   Diagnosis Date   • Allergic rhinitis    • ASHD (arteriosclerotic heart disease)    • Atrial fibrillation (CMS/[facility])    • AV block    • Cerebellar stroke (CMS/[facility])    • CHF (congestive heart failure) (CMS/[facility])    • Coronary artery disease    • Depression    • DM type 2 (diabetes mellitus, type 2) (CMS/[facility])    • Encounter for special screening examination for neoplasm of prostate    • Fatigue    • Hot flashes    • Hypertension    • Hypotension    • Imbalance    • Intermittent claudication (CMS/[facility])    • Ischemic cardiomyopathy    • PALMER (obstructive sleep apnea)    • PAF (paroxysmal atrial fibrillation) (CMS/[facility])          PAST SURGICAL HISTORY  Past Surgical History:   Procedure Laterality Date   • BUNIONECTOMY      BILATERAL FEET   • CARDIAC CATHETERIZATION Left 06/16/2011    [facility], Dr. Walt Saab, coronaries and LV gram   • CARDIOVERSION  08/20/2009    [facility], Dr. James, cardioverison of atrial flutter to sinus rhythm, reprogramming of pacemaker   • CAROTID STENT      EF=10-15%LEFT MAIN NORMAL , MILD LUMINAL IRR OF LED AND 20% DISEASE OF THE CIRCUMFLEX 80% LESIONIN THE PROX RCA THAT WAS ACTUALLY NONDOMINANT 2.5 X 18 MM VISION BARE METAL STENT IN THE MID RCA    • CATARACT EXTRACTION Bilateral    • COLONOSCOPY N/A 3/3/2017    Procedure: COLONOSCOPY TO CECUM;  Surgeon: Benton Castellanos MD;  Location: [facility] ENDOSCOPY;  Service:    • CORONARY ANGIOPLASTY WITH STENT PLACEMENT  06/16/2011    EF = 10-15%.  Left main was normal, mild luminal irregularities of LAD, and 20% disease of the circumflex.  80% lesion in the prox RCA that was actually nondominant.  2.5 x 18mm Vision bare metal stent in the mid RCA.   • ELBOW PROCEDURE      REMOVAL OF NODULE ON LEFT ELBOW   • KNEE ACL RECONSTRUCTION      LEFT KNEE   • [facility]   • PACEMAKER REPLACEMENT  11/12/2015         FAMILY HISTORY  Family History   Problem Relation Age of Onset   • Lung cancer Mother    • Stroke Father    • Heart attack Father    • Lung cancer Father    • Diabetes Brother    • Hyperlipidemia Brother    • Diabetes Brother    • Throat cancer Brother          SOCIAL HISTORY  Social History     Socioeconomic History   • Marital status: Single     Spouse name: Not on file   • Number of children: Not on file   • Years of education: Not on file   • Highest education level: Not on file   Tobacco Use   • Smoking status: Never Smoker   • Smokeless tobacco: Never Used   • Tobacco comment: no caffeine use   Substance and Sexual Activity   • Alcohol use: Yes     Comment: ocasional   • Drug use: No         ALLERGIES  Codeine and Codeine sulfate        REVIEW OF SYSTEMS  Review of Systems   Limited due to dementia      PHYSICAL EXAM    I have reviewed the triage vital signs and nursing notes.    ED Triage Vitals [04/24/21 1827]   Temp Heart Rate Resp BP SpO2   98.2 °F (36.8 °C) 86 20 124/88 93 %      Temp src Heart Rate Source Patient Position BP Location FiO2 (%)   Tympanic Monitor -- -- --       Physical Exam  GENERAL: not distressed  HENT: nares patent, to small superficial scalp lacerations on the left occipital region.  EYES: no scleral icterus  CV: regular rhythm, regular rate  RESPIRATORY: normal effort  ABDOMEN: soft  MUSCULOSKELETAL: no deformity  NEURO: alert, moves all extremities, follows commands.  GCS 15  SKIN: warm, dry        LAB RESULTS  No results found for this or any previous visit (from the past 24 hour(s)).    Ordered the above labs and independently reviewed the results.        RADIOLOGY  No Radiology Exams Resulted Within Past 24 Hours    I ordered the above noted radiological studies. Reviewed by me and discussed with radiologist.  See dictation for official radiology  interpretation.      PROCEDURES    Laceration Repair    Date/Time: 4/26/2021 1:09 AM  Performed by: Andreas Mendez MD  Authorized by: Andreas Mendez MD     Consent:     Consent obtained:  Verbal    Consent given by:  Patient and healthcare agent  Anesthesia (see MAR for exact dosages):     Anesthesia method:  Local infiltration    Local anesthetic:  Bupivacaine 0.5% w/o epi and lidocaine 1% WITH epi  Laceration details:     Location:  Scalp    Scalp location:  Occipital    Length (cm):  3  Repair type:     Repair type:  Simple  Pre-procedure details:     Preparation:  Patient was prepped and draped in usual sterile fashion  Exploration:     Hemostasis achieved with:  Epinephrine and direct pressure    Wound exploration: wound explored through full range of motion      Wound extent: areolar tissue violated      Contaminated: no    Treatment:     Area cleansed with:  Saline    Amount of cleaning:  Standard    Irrigation solution:  Sterile saline    Irrigation method:  Syringe    Visualized foreign bodies/material removed: no    Skin repair:     Repair method:  Staples    Number of staples:  6  Approximation:     Approximation:  Close  Post-procedure details:     Dressing:  Antibiotic ointment    Patient tolerance of procedure:  Tolerated well, no immediate complications  Comments:      There were 2 adjacent lacerations.  Each was 1.5 cm.  This note comprises the total of both laceration repairs.  There were 3 staples placed in each          MEDICATIONS GIVEN IN ER    Medications - No data to display      PROGRESS, DATA ANALYSIS, CONSULTS, AND MEDICAL DECISION MAKING    All labs have been independently reviewed by me.  All radiology studies have been reviewed by me and discussed with radiologist dictating the report.   EKG's independently viewed and interpreted by me.  Discussion below represents my analysis of pertinent findings related to patient's condition, differential diagnosis, treatment plan and final  disposition.                 PPE: Both the patient and I wore a surgical mask throughout the entire patient encounter. I wore protective goggles.     AS OF 01:10 EDT VITALS:    BP - 123/95  HR - 75  TEMP - 98.2 °F (36.8 °C) (Tympanic)  O2 SATS - 91%        DIAGNOSIS  Final diagnoses:   Laceration of occipital scalp, initial encounter         DISPOSITION  Discharge           Andreas Mendez MD  04/26/21 0111

## 2021-04-24 NOTE — ED NOTES
Pt was brought in from home for fall. Pt reports losing balance and falling backwards. Pt has 2 lacerations to posterior head. Bleeding controlled.  Pt is difficult to understand due to prior health conditions. Pt also c/o left hip pain. Pt has full range of motion. Distal pulses strong    Pt had mask on when placed in room. RN wore goggles and surgical mask upon entering room.        Rachel Saenz, RN  04/24/21 [id number]

## 2021-04-24 NOTE — ED TRIAGE NOTES
Pt arrives via EMS from home. Pt had mechanical fall today and hit his head. Pt has 2 lacerations on the back of his head. Pt complains of mild dizziness. Pt on 2L O2 and on RA usually. .    Pt alert and oriented x 4.    Pt masked on arrival, staff masked

## (undated) DEVICE — THE TORRENT IRRIGATION SCOPE CONNECTOR IS USED WITH THE TORRENT IRRIGATION TUBING TO PROVIDE IRRIGATION FLUIDS SUCH AS STERILE WATER DURING GASTROINTESTINAL ENDOSCOPIC PROCEDURES WHEN USED IN CONJUNCTION WITH AN IRRIGATION PUMP (OR ELECTROSURGICAL UNIT).: Brand: TORRENT

## (undated) DEVICE — TUBING, SUCTION, 1/4" X 10', STRAIGHT: Brand: MEDLINE

## (undated) DEVICE — CANN NASL CO2 TRULINK W/O2 A/

## (undated) DEVICE — Device: Brand: DEFENDO AIR/WATER/SUCTION AND BIOPSY VALVE